# Patient Record
Sex: MALE | Race: WHITE | Employment: UNEMPLOYED | URBAN - METROPOLITAN AREA
[De-identification: names, ages, dates, MRNs, and addresses within clinical notes are randomized per-mention and may not be internally consistent; named-entity substitution may affect disease eponyms.]

---

## 2019-06-17 ENCOUNTER — HOSPITAL ENCOUNTER (EMERGENCY)
Age: 49
Discharge: HOME | End: 2019-06-17
Attending: EMERGENCY MEDICINE
Payer: COMMERCIAL

## 2019-06-17 VITALS
SYSTOLIC BLOOD PRESSURE: 145 MMHG | DIASTOLIC BLOOD PRESSURE: 103 MMHG | HEIGHT: 69 IN | BODY MASS INDEX: 32.49 KG/M2 | OXYGEN SATURATION: 96 % | TEMPERATURE: 98.1 F | HEART RATE: 67 BPM | RESPIRATION RATE: 17 BRPM | WEIGHT: 219.36 LBS

## 2019-06-17 DIAGNOSIS — I10 HYPERTENSION, UNSPECIFIED TYPE: ICD-10-CM

## 2019-06-17 DIAGNOSIS — H66.003 ACUTE SUPPURATIVE OTITIS MEDIA OF BOTH EARS WITHOUT SPONTANEOUS RUPTURE OF TYMPANIC MEMBRANES, RECURRENCE NOT SPECIFIED: ICD-10-CM

## 2019-06-17 DIAGNOSIS — J30.9 ALLERGIC SINUSITIS: Primary | ICD-10-CM

## 2019-06-17 PROCEDURE — 99283 EMERGENCY DEPT VISIT LOW MDM: CPT

## 2019-06-17 RX ORDER — AMOXICILLIN 875 MG/1
875 TABLET, COATED ORAL 2 TIMES DAILY
Qty: 20 TABLET | Refills: 0 | Status: SHIPPED | OUTPATIENT
Start: 2019-06-17 | End: 2019-06-27

## 2019-06-17 RX ORDER — PREDNISONE 20 MG/1
TABLET ORAL
Qty: 18 TABLET | Refills: 0 | Status: SHIPPED | OUTPATIENT
Start: 2019-06-17 | End: 2019-06-27

## 2019-06-17 ASSESSMENT — PAIN SCALES - GENERAL
PAINLEVEL_OUTOF10: 0
PAINLEVEL_OUTOF10: 0

## 2019-06-17 NOTE — ED PROVIDER NOTES
[facility]  eMERGENCY dEPARTMENT eNCOUnter      Pt Name: Sue Rai  MRN: [id number]  [place] 1970  Date of evaluation: 6/17/2019  Provider: Suly Lipcsomb MD    [address] [place] [address]       Chief Complaint   Patient presents with    Cough     For two weeks. Accompanied by sinus congestion and ear fullness. HISTORY OF PRESENT ILLNESS  (Location/Symptom, Timing/Onset, Context/Setting, Quality, Duration, Modifying Factors, Severity.)   Sue Rai is a 46 y.o. male who presents to the emergency department complaining of nasal congestion, sinus pressure, postnasal drip and cough for 2 weeks. He states is been intermittently better and worse. Initially his nasal drainage was green, is now clear. He has pressure and discomfort in both ears. He denies any documented fever. His cough was initially productive, but is no longer productive. He coughs worse at night when he lays down. No chest pain. He does smoke a half a pack a day. He has no pulmonary disease. He has no history of hypertension. Nursing Notes were reviewed and I agree. REVIEW OF SYSTEMS    (2-9 systems for level 4, 10 or more for level 5)     General: No fever or chills. ENT: Nasal congestion, postnasal drip, ear pressure and discomfort. Respiratory: Cough as above. No shortness of breath. Except as noted above the remainder of the review of systems was reviewed and negative. PAST MEDICAL HISTORY         Diagnosis Date    ADD (attention deficit disorder)     Depression        SURGICAL HISTORY           Procedure Laterality Date    FRACTURE SURGERY         CURRENT MEDICATIONS       Previous Medications    No medications on file       ALLERGIES     Patient has no known allergies. FAMILY HISTORY     History reviewed. No pertinent family history. No family status information on file. SOCIAL HISTORY      reports that he has been smoking cigarettes.   He has been smoking about 0.50 packs per day. He has never used smokeless tobacco. He reports that he drinks alcohol. He reports that he has current or past drug history. PHYSICAL EXAM    (up to 7 for level 4, 8 or more for level 5)     ED Triage Vitals [06/17/19 1649]   BP Temp Temp Source Pulse Resp SpO2 Height Weight   (!) 183/110 98.1 °F (36.7 °C) Oral 67 17 96 % 5' 9\" (1.753 m) 219 lb 5.7 oz (99.5 kg)       General: Alert white male in no acute distress. Head: Atraumatic and normocephalic. Eyes: No conjunctival injection. Pupils equal round reactive. ENT: TMs are dull retracted and slightly red bilaterally. Nose shows clear rhinorrhea. Oropharynx is moist without erythema. Neck: Supple, nontender, no adenopathy. Heart: Regular rate and rhythm. No murmurs or gallops noted. Lungs: Breath sounds equal bilaterally and clear. No rales rhonchi or wheezes. DIAGNOSTIC RESULTS     RADIOLOGY:   Non-plain film images such as CT, Ultrasound and MRI are read by the radiologist. Plain radiographic images are visualized and preliminarily interpreted by Crystal Bond MD with the below findings:      Interpretation per the Radiologist below, if available at the time of this note:    No orders to display       LABS:  Labs Reviewed - No data to display    All other labs were within normal range or not returned as of this dictation. EMERGENCY DEPARTMENT COURSE and DIFFERENTIAL DIAGNOSIS/MDM:   Vitals:    Vitals:    06/17/19 1649   BP: (!) 183/110   Pulse: 67   Resp: 17   Temp: 98.1 °F (36.7 °C)   TempSrc: Oral   SpO2: 96%   Weight: 219 lb 5.7 oz (99.5 kg)   Height: 5' 9\" (1.753 m)       Clinically the patient has a sinusitis, likely allergic with a secondary otitis bilaterally. He has no evidence of tonsillitis or pharyngitis. His lungs are clear. He has no evidence of pneumonia or bronchitis. He is already taking an over-the-counter antihistamine. He was instructed to continue this.   He was put on a 9-day prednisone taper along with amoxicillin for a separative otitis media. He was given a primary care referral.  His blood pressure was significantly elevated. He states he is never been treated for hypertension. I explained to him the risk of untreated hypertension including but not limited to stroke, renal failure, congestive heart failure with cardiomyopathy. I stressed to him the importance of documenting his blood pressures at home and following up with a primary care provider to discuss further evaluation and treatment. The treatment plan was discussed. The follow-up was discussed. The patient understands the treatment plan follow-up. PROCEDURES:  None    FINAL IMPRESSION      1. Allergic sinusitis    2. Acute suppurative otitis media of both ears without spontaneous rupture of tympanic membranes, recurrence not specified          DISPOSITION/PLAN   DISPOSITION Decision To Discharge 06/17/2019 05:03:16 PM      PATIENT REFERRED TO:  No follow-up provider specified.     DISCHARGE MEDICATIONS:  New Prescriptions    AMOXICILLIN (AMOXIL) 875 MG TABLET    Take 1 tablet by mouth 2 times daily for 10 days    PREDNISONE (DELTASONE) 20 MG TABLET    3 tabs po qam for 3 days then 2 tabs qam for 3 days the 1 tab qam for 3 days       (Please note that portions of this note were completed with a voice recognition program.  Efforts were made to edit the dictations but occasionally words are mis-transcribed.)    Aroldo Hirsch MD  Attending Emergency Physician        Arik Banuelos MD  06/17/19 9748

## 2019-06-17 NOTE — ED TRIAGE NOTES
Patient states he has had a cough and sinus congestion for two weeks. Initially he had some green phlegm. No recent fever. Taking OTC cough and cold medications. Cough is worse at night. Has cut back to 1/2 pack per day for smoking.

## 2019-06-17 NOTE — LETTER
[address] [address]  Phone: [phone]               June 17, 2019    Patient: Savi Mccallum   YOB: 1970   Date of Visit: 6/17/2019       To Whom It May Concern:    Melyssa Ybarra was seen and treated in our emergency department on 6/17/2019. He may return to work on 6/18/19.       Sincerely,       Dr. Torie Hunter        Signature:__________________________________

## 2019-06-24 ENCOUNTER — OFFICE VISIT (OUTPATIENT)
Dept: INTERNAL MEDICINE CLINIC | Age: 49
End: 2019-06-24
Payer: COMMERCIAL

## 2019-06-24 VITALS
DIASTOLIC BLOOD PRESSURE: 82 MMHG | HEART RATE: 70 BPM | TEMPERATURE: 98.3 F | HEIGHT: 69 IN | SYSTOLIC BLOOD PRESSURE: 128 MMHG | WEIGHT: 223 LBS | BODY MASS INDEX: 33.03 KG/M2 | OXYGEN SATURATION: 98 %

## 2019-06-24 DIAGNOSIS — Z13.220 SCREENING CHOLESTEROL LEVEL: ICD-10-CM

## 2019-06-24 DIAGNOSIS — R03.0 BORDERLINE HYPERTENSION: Primary | ICD-10-CM

## 2019-06-24 DIAGNOSIS — Z12.5 PROSTATE CANCER SCREENING: ICD-10-CM

## 2019-06-24 DIAGNOSIS — H66.93 BILATERAL OTITIS MEDIA, UNSPECIFIED OTITIS MEDIA TYPE: ICD-10-CM

## 2019-06-24 PROCEDURE — 4004F PT TOBACCO SCREEN RCVD TLK: CPT | Performed by: INTERNAL MEDICINE

## 2019-06-24 PROCEDURE — G8417 CALC BMI ABV UP PARAM F/U: HCPCS | Performed by: INTERNAL MEDICINE

## 2019-06-24 PROCEDURE — G8427 DOCREV CUR MEDS BY ELIG CLIN: HCPCS | Performed by: INTERNAL MEDICINE

## 2019-06-24 PROCEDURE — 99203 OFFICE O/P NEW LOW 30 MIN: CPT | Performed by: INTERNAL MEDICINE

## 2019-06-24 PROCEDURE — 3017F COLORECTAL CA SCREEN DOC REV: CPT | Performed by: INTERNAL MEDICINE

## 2019-06-24 RX ORDER — FLUTICASONE PROPIONATE 50 MCG
1 SPRAY, SUSPENSION (ML) NASAL DAILY
Qty: 1 BOTTLE | Refills: 2 | Status: SHIPPED | OUTPATIENT
Start: 2019-06-24 | End: 2019-09-20 | Stop reason: SDUPTHER

## 2019-06-24 ASSESSMENT — PATIENT HEALTH QUESTIONNAIRE - PHQ9
SUM OF ALL RESPONSES TO PHQ QUESTIONS 1-9: 0
SUM OF ALL RESPONSES TO PHQ9 QUESTIONS 1 & 2: 0
2. FEELING DOWN, DEPRESSED OR HOPELESS: 0
SUM OF ALL RESPONSES TO PHQ QUESTIONS 1-9: 0
1. LITTLE INTEREST OR PLEASURE IN DOING THINGS: 0

## 2019-06-24 ASSESSMENT — ENCOUNTER SYMPTOMS
NAUSEA: 0
SHORTNESS OF BREATH: 0
BACK PAIN: 0
BLOOD IN STOOL: 0
DIARRHEA: 1
ABDOMINAL PAIN: 0
CONSTIPATION: 0
VOMITING: 0
COUGH: 0
RHINORRHEA: 0
SINUS PRESSURE: 0

## 2019-06-24 NOTE — PROGRESS NOTES
SUBJECTIVE:  Campbell Vargas is a 44 y.o. male being evaluated for:    Chief Complaint   Patient presents with   [facility] Established New Doctor     No pcp for several years    ED Follow-up     THE [facility] 6/17/19 for sinusitis. currently taking abx and steroid. symptoms improved.  Hypertension     BP was 183/110 @ ED       HPI   Patient went to ed with head congestion cough and diagnosed with sinusits  Had taken mucinex and allergy medicines  OTC  Treated with amoxil and prednisone   [place]  Now better   No headaches nose bleeds cp are dizzines     No Known Allergies  Current Outpatient Medications   Medication Sig Dispense Refill    Loratadine (CLARITIN PO) Take by mouth daily as needed (allergies)      fluticasone (FLONASE) 50 MCG/ACT nasal spray 1 spray by Each Nostril route daily 1 Bottle 2     No current facility-administered medications for this visit.           Social History     Socioeconomic History    Marital status:      Spouse name: Not on file    Number of children: Not on file    Years of education: Not on file    Highest education level: Not on file   Occupational History    Not on file   Social Needs    Financial resource strain: Not on file    Food insecurity:     Worry: Not on file     Inability: Not on file    Transportation needs:     Medical: Not on file     Non-medical: Not on file   Tobacco Use    Smoking status: Current Every Day Smoker     Packs/day: 0.50     Years: 35.00     Pack years: 17.50     Types: Cigarettes     Start date: 1/1/1988    Smokeless tobacco: Never Used   Substance and Sexual Activity    Alcohol use: Yes     Comment: occassional    Drug use: Never    Sexual activity: Yes     Partners: Female   Lifestyle    Physical activity:     Days per week: Not on file     Minutes per session: Not on file    Stress: Not on file   Relationships    Social connections:     Talks on phone: Not on file     Gets together: Not on file     Attends [employer].       OBJECTIVE:  /82   Pulse 70   Temp 98.3 °F (36.8 °C) (Oral)   Ht 5' 9\" (1.753 m)   Wt 223 lb (101.2 kg)   SpO2 98%   BMI 32.93 kg/m²      Body mass index is 32.93 kg/m². Physical Exam   Constitutional: He is oriented to person, place, and time. He appears well-developed and well-nourished. No distress. HENT:   Head: Normocephalic and atraumatic. Right Ear: External ear normal.   Left Ear: External ear normal.   Mouth/Throat: Oropharynx is clear and moist.   Tm clear    Eyes: Conjunctivae are normal.   Neck: Neck supple. No JVD present. No thyromegaly present. No bruits    Cardiovascular: Normal rate, regular rhythm and normal heart sounds. Exam reveals no gallop and no friction rub. No murmur heard. Pulmonary/Chest: Effort normal and breath sounds normal. He exhibits no tenderness. Abdominal: Soft. He exhibits no distension. There is no tenderness. No HSM   Musculoskeletal: He exhibits no edema. Lymphadenopathy:     He has no cervical adenopathy. Neurological: He is alert and oriented to person, place, and time. Coordination normal.   Skin: Skin is warm and dry. No rash noted. Psychiatric: He has a normal mood and affect. His behavior is normal.   Nursing note and vitals reviewed. ASSESSMENT/PLAN:    Zane Corado was seen today for established new doctor, ed follow-up and hypertension. Diagnoses and all orders for this visit:    Borderline hypertension okay here today. To decrease caffeine and salt diet and will watch   -     Comprehensive Metabolic Panel; Future    Prostate cancer screening  -     Psa screening; Future    Screening cholesterol level  -     Lipid Panel; Future    Bilateral otitis media, unspecified otitis media type overall better than at the ER.   We will do some Flonase for rhinitis and prevention  -     fluticasone (FLONASE) 50 MCG/ACT nasal spray; 1 spray by Each Nostril route daily        Orders Placed This Encounter   Medications    fluticasone (FLONASE) 50 MCG/ACT nasal spray     Si spray by Each Nostril route daily     Dispense:  1 Bottle     Refill:  2        Return in about 6 months (around 2019), or if symptoms worsen or fail to improve. There are no Patient Instructions on file for this visit.

## 2019-06-28 DIAGNOSIS — Z13.220 SCREENING CHOLESTEROL LEVEL: ICD-10-CM

## 2019-06-28 DIAGNOSIS — R03.0 BORDERLINE HYPERTENSION: ICD-10-CM

## 2019-06-28 DIAGNOSIS — Z12.5 PROSTATE CANCER SCREENING: ICD-10-CM

## 2019-06-28 LAB
A/G RATIO: 1.2 (ref 1.1–2.2)
ALBUMIN SERPL-MCNC: 4.1 G/DL (ref 3.4–5)
ALP BLD-CCNC: 66 U/L (ref 40–129)
ALT SERPL-CCNC: 50 U/L (ref 10–40)
ANION GAP SERPL CALCULATED.3IONS-SCNC: 12 MMOL/L (ref 3–16)
AST SERPL-CCNC: 39 U/L (ref 15–37)
BILIRUB SERPL-MCNC: <0.2 MG/DL (ref 0–1)
BUN BLDV-MCNC: 14 MG/DL (ref 7–20)
CALCIUM SERPL-MCNC: 9.4 MG/DL (ref 8.3–10.6)
CHLORIDE BLD-SCNC: 100 MMOL/L (ref 99–110)
CHOLESTEROL, TOTAL: 251 MG/DL (ref 0–199)
CO2: 25 MMOL/L (ref 21–32)
CREAT SERPL-MCNC: 1 MG/DL (ref 0.9–1.3)
GFR AFRICAN AMERICAN: >60
GFR NON-AFRICAN AMERICAN: >60
GLOBULIN: 3.3 G/DL
GLUCOSE BLD-MCNC: 101 MG/DL (ref 70–99)
HDLC SERPL-MCNC: 44 MG/DL (ref 40–60)
LDL CHOLESTEROL CALCULATED: ABNORMAL MG/DL
LDL CHOLESTEROL DIRECT: 137 MG/DL
POTASSIUM SERPL-SCNC: 4.7 MMOL/L (ref 3.5–5.1)
PROSTATE SPECIFIC ANTIGEN: 0.71 NG/ML (ref 0–4)
SODIUM BLD-SCNC: 137 MMOL/L (ref 136–145)
TOTAL PROTEIN: 7.4 G/DL (ref 6.4–8.2)
TRIGL SERPL-MCNC: 490 MG/DL (ref 0–150)
VLDLC SERPL CALC-MCNC: ABNORMAL MG/DL

## 2019-07-11 ENCOUNTER — OFFICE VISIT (OUTPATIENT)
Dept: INTERNAL MEDICINE CLINIC | Age: 49
End: 2019-07-11
Payer: COMMERCIAL

## 2019-07-11 VITALS
SYSTOLIC BLOOD PRESSURE: 138 MMHG | RESPIRATION RATE: 16 BRPM | WEIGHT: 220 LBS | DIASTOLIC BLOOD PRESSURE: 80 MMHG | BODY MASS INDEX: 32.49 KG/M2 | HEART RATE: 68 BPM | OXYGEN SATURATION: 96 % | TEMPERATURE: 97.9 F

## 2019-07-11 DIAGNOSIS — H66.92 OTITIS OF LEFT EAR: Primary | ICD-10-CM

## 2019-07-11 PROCEDURE — 3017F COLORECTAL CA SCREEN DOC REV: CPT | Performed by: INTERNAL MEDICINE

## 2019-07-11 PROCEDURE — G8427 DOCREV CUR MEDS BY ELIG CLIN: HCPCS | Performed by: INTERNAL MEDICINE

## 2019-07-11 PROCEDURE — G8417 CALC BMI ABV UP PARAM F/U: HCPCS | Performed by: INTERNAL MEDICINE

## 2019-07-11 PROCEDURE — 99213 OFFICE O/P EST LOW 20 MIN: CPT | Performed by: INTERNAL MEDICINE

## 2019-07-11 PROCEDURE — 4004F PT TOBACCO SCREEN RCVD TLK: CPT | Performed by: INTERNAL MEDICINE

## 2019-07-11 RX ORDER — CHLORAL HYDRATE 500 MG
2000 CAPSULE ORAL
COMMUNITY

## 2019-07-11 RX ORDER — OFLOXACIN 3 MG/ML
5 SOLUTION AURICULAR (OTIC) 2 TIMES DAILY
Qty: 10 ML | Refills: 0 | Status: SHIPPED | OUTPATIENT
Start: 2019-07-11 | End: 2019-12-02 | Stop reason: ALTCHOICE

## 2019-07-11 RX ORDER — SULFAMETHOXAZOLE AND TRIMETHOPRIM 800; 160 MG/1; MG/1
1 TABLET ORAL 2 TIMES DAILY
Qty: 20 TABLET | Refills: 0 | Status: SHIPPED | OUTPATIENT
Start: 2019-07-11 | End: 2019-07-21

## 2019-07-11 NOTE — PATIENT INSTRUCTIONS
Please call your pharmacy if you need any refills of your medication(s). Please call our office at [phone] if you don't hear from us about your test results. Bring an accurate list of your medications with you at every appointment to ensure that we have the correct information.     Our office hours are: Monday - Friday 8:30 am- 5 pm    Phone lines turn on at 8:30 am

## 2019-07-20 ASSESSMENT — ENCOUNTER SYMPTOMS
SORE THROAT: 0
NAUSEA: 0
COUGH: 0
VOMITING: 0
SINUS PRESSURE: 0
ABDOMINAL PAIN: 0
DIARRHEA: 0
SHORTNESS OF BREATH: 0

## 2019-09-20 RX ORDER — FLUTICASONE PROPIONATE 50 MCG
SPRAY, SUSPENSION (ML) NASAL
Qty: 1 BOTTLE | Refills: 5 | Status: SHIPPED | OUTPATIENT
Start: 2019-09-20 | End: 2020-03-31

## 2019-11-19 ENCOUNTER — TELEPHONE (OUTPATIENT)
Dept: INTERNAL MEDICINE CLINIC | Age: 49
End: 2019-11-19

## 2019-11-19 ENCOUNTER — NURSE ONLY (OUTPATIENT)
Dept: INTERNAL MEDICINE CLINIC | Age: 49
End: 2019-11-19
Payer: COMMERCIAL

## 2019-11-19 DIAGNOSIS — Z23 NEED FOR IMMUNIZATION AGAINST INFLUENZA: Primary | ICD-10-CM

## 2019-11-19 PROCEDURE — 90471 IMMUNIZATION ADMIN: CPT | Performed by: INTERNAL MEDICINE

## 2019-11-19 PROCEDURE — 90686 IIV4 VACC NO PRSV 0.5 ML IM: CPT | Performed by: INTERNAL MEDICINE

## 2019-12-02 ENCOUNTER — OFFICE VISIT (OUTPATIENT)
Dept: INTERNAL MEDICINE CLINIC | Age: 49
End: 2019-12-02
Payer: COMMERCIAL

## 2019-12-02 VITALS
HEART RATE: 76 BPM | OXYGEN SATURATION: 97 % | SYSTOLIC BLOOD PRESSURE: 146 MMHG | BODY MASS INDEX: 33.41 KG/M2 | WEIGHT: 225.6 LBS | TEMPERATURE: 97.6 F | HEIGHT: 69 IN | DIASTOLIC BLOOD PRESSURE: 92 MMHG

## 2019-12-02 DIAGNOSIS — S16.1XXA STRAIN OF NECK MUSCLE, INITIAL ENCOUNTER: ICD-10-CM

## 2019-12-02 DIAGNOSIS — I10 ESSENTIAL HYPERTENSION: Primary | ICD-10-CM

## 2019-12-02 DIAGNOSIS — Z13.220 SCREENING CHOLESTEROL LEVEL: ICD-10-CM

## 2019-12-02 DIAGNOSIS — F90.0 ATTENTION DEFICIT HYPERACTIVITY DISORDER (ADHD), PREDOMINANTLY INATTENTIVE TYPE: ICD-10-CM

## 2019-12-02 DIAGNOSIS — E78.2 MIXED HYPERLIPIDEMIA: ICD-10-CM

## 2019-12-02 PROBLEM — R03.0 ELEVATED BLOOD-PRESSURE READING, WITHOUT DIAGNOSIS OF HYPERTENSION: Status: ACTIVE | Noted: 2019-12-02

## 2019-12-02 PROCEDURE — 3017F COLORECTAL CA SCREEN DOC REV: CPT | Performed by: INTERNAL MEDICINE

## 2019-12-02 PROCEDURE — G8417 CALC BMI ABV UP PARAM F/U: HCPCS | Performed by: INTERNAL MEDICINE

## 2019-12-02 PROCEDURE — G8482 FLU IMMUNIZE ORDER/ADMIN: HCPCS | Performed by: INTERNAL MEDICINE

## 2019-12-02 PROCEDURE — G8427 DOCREV CUR MEDS BY ELIG CLIN: HCPCS | Performed by: INTERNAL MEDICINE

## 2019-12-02 PROCEDURE — 99213 OFFICE O/P EST LOW 20 MIN: CPT | Performed by: INTERNAL MEDICINE

## 2019-12-02 PROCEDURE — 4004F PT TOBACCO SCREEN RCVD TLK: CPT | Performed by: INTERNAL MEDICINE

## 2019-12-02 RX ORDER — TIZANIDINE 4 MG/1
TABLET ORAL
Qty: 30 TABLET | Refills: 0 | Status: SHIPPED | OUTPATIENT
Start: 2019-12-02 | End: 2020-01-13 | Stop reason: ALTCHOICE

## 2019-12-02 RX ORDER — LISINOPRIL 10 MG/1
10 TABLET ORAL DAILY
Qty: 90 TABLET | Refills: 1 | Status: SHIPPED | OUTPATIENT
Start: 2019-12-02 | End: 2020-01-13 | Stop reason: SINTOL

## 2019-12-02 RX ORDER — NAPROXEN SODIUM 220 MG
550 TABLET ORAL 2 TIMES DAILY WITH MEALS
Qty: 40 TABLET | Refills: 0 | COMMUNITY
Start: 2019-12-02

## 2019-12-17 ENCOUNTER — TELEPHONE (OUTPATIENT)
Dept: INTERNAL MEDICINE CLINIC | Age: 49
End: 2019-12-17

## 2019-12-17 ENCOUNTER — NURSE TRIAGE (OUTPATIENT)
Dept: OTHER | Facility: CLINIC | Age: 49
End: 2019-12-17

## 2019-12-17 DIAGNOSIS — R05.9 COUGH: Primary | ICD-10-CM

## 2019-12-17 RX ORDER — LEVOFLOXACIN 500 MG/1
500 TABLET, FILM COATED ORAL DAILY
Qty: 10 TABLET | Refills: 0 | Status: SHIPPED | OUTPATIENT
Start: 2019-12-17 | End: 2019-12-27

## 2019-12-17 RX ORDER — GUAIFENESIN AND CODEINE PHOSPHATE 100; 10 MG/5ML; MG/5ML
10 SOLUTION ORAL 3 TIMES DAILY PRN
Qty: 120 ML | Refills: 0 | Status: SHIPPED | OUTPATIENT
Start: 2019-12-17 | End: 2019-12-24

## 2019-12-23 ASSESSMENT — ENCOUNTER SYMPTOMS
ABDOMINAL PAIN: 0
DIARRHEA: 0
SINUS PRESSURE: 0
BACK PAIN: 0
NAUSEA: 0
SHORTNESS OF BREATH: 0
VOMITING: 0
BLOOD IN STOOL: 0
CONSTIPATION: 0
RHINORRHEA: 0
COUGH: 0

## 2020-01-13 ENCOUNTER — OFFICE VISIT (OUTPATIENT)
Dept: INTERNAL MEDICINE CLINIC | Age: 50
End: 2020-01-13
Payer: COMMERCIAL

## 2020-01-13 VITALS
BODY MASS INDEX: 34.21 KG/M2 | HEIGHT: 69 IN | SYSTOLIC BLOOD PRESSURE: 118 MMHG | HEART RATE: 70 BPM | TEMPERATURE: 97.9 F | OXYGEN SATURATION: 97 % | WEIGHT: 231 LBS | DIASTOLIC BLOOD PRESSURE: 78 MMHG

## 2020-01-13 PROCEDURE — G8417 CALC BMI ABV UP PARAM F/U: HCPCS | Performed by: INTERNAL MEDICINE

## 2020-01-13 PROCEDURE — 3017F COLORECTAL CA SCREEN DOC REV: CPT | Performed by: INTERNAL MEDICINE

## 2020-01-13 PROCEDURE — G8482 FLU IMMUNIZE ORDER/ADMIN: HCPCS | Performed by: INTERNAL MEDICINE

## 2020-01-13 PROCEDURE — 4004F PT TOBACCO SCREEN RCVD TLK: CPT | Performed by: INTERNAL MEDICINE

## 2020-01-13 PROCEDURE — G8427 DOCREV CUR MEDS BY ELIG CLIN: HCPCS | Performed by: INTERNAL MEDICINE

## 2020-01-13 PROCEDURE — 99212 OFFICE O/P EST SF 10 MIN: CPT | Performed by: INTERNAL MEDICINE

## 2020-01-13 ASSESSMENT — PATIENT HEALTH QUESTIONNAIRE - PHQ9
SUM OF ALL RESPONSES TO PHQ9 QUESTIONS 1 & 2: 0
SUM OF ALL RESPONSES TO PHQ QUESTIONS 1-9: 0
SUM OF ALL RESPONSES TO PHQ QUESTIONS 1-9: 0
1. LITTLE INTEREST OR PLEASURE IN DOING THINGS: 0
2. FEELING DOWN, DEPRESSED OR HOPELESS: 0

## 2020-01-13 NOTE — PROGRESS NOTES
on file     Active member of club or organization: Not on file     Attends meetings of clubs or organizations: Not on file     Relationship status: Not on file    Intimate partner violence:     Fear of current or ex partner: Not on file     Emotionally abused: Not on file     Physically abused: Not on file     Forced sexual activity: Not on file   Other Topics Concern    Not on file   Social History Narrative    Not on file      Past Medical History:   Diagnosis Date    ADD (attention deficit disorder)     Depression     Hypertension      Past Surgical History:   Procedure Laterality Date    FRACTURE SURGERY      right wrist    WISDOM TOOTH EXTRACTION         Review of Systems   Constitutional: Negative for activity change, fatigue and unexpected weight change. HENT: Negative for congestion and nosebleeds. Eyes: Negative for visual disturbance. Respiratory: Positive for cough (with lisinopril ). Negative for shortness of breath. Cardiovascular: Negative for chest pain, palpitations and leg swelling. Gastrointestinal: Negative for abdominal pain, blood in stool, constipation, diarrhea, nausea and vomiting. Genitourinary: Negative for difficulty urinating. Neurological: Negative for dizziness, light-headedness and headaches. OBJECTIVE:  /78   Pulse 70   Temp 97.9 °F (36.6 °C) (Oral)   Ht 5' 9\" (1.753 m)   Wt 231 lb (104.8 kg)   SpO2 97%   BMI 34.11 kg/m²      Body mass index is 34.11 kg/m². Physical Exam  Vitals signs and nursing note reviewed. Constitutional:       General: He is not in acute distress. Appearance: Normal appearance. He is well-developed. He is obese. HENT:      Head: Normocephalic and atraumatic. Right Ear: External ear normal.      Left Ear: External ear normal.      Mouth/Throat:      Pharynx: Oropharynx is clear. Eyes:      Conjunctiva/sclera: Conjunctivae normal.   Neck:      Musculoskeletal: Neck supple. Thyroid: No thyromegaly.

## 2020-01-19 ASSESSMENT — ENCOUNTER SYMPTOMS
ABDOMINAL PAIN: 0
VOMITING: 0
CONSTIPATION: 0
DIARRHEA: 0
SHORTNESS OF BREATH: 0
BLOOD IN STOOL: 0
COUGH: 1
NAUSEA: 0

## 2020-02-10 ENCOUNTER — TELEPHONE (OUTPATIENT)
Dept: INTERNAL MEDICINE CLINIC | Age: 50
End: 2020-02-10

## 2020-02-19 ENCOUNTER — OFFICE VISIT (OUTPATIENT)
Dept: INTERNAL MEDICINE CLINIC | Age: 50
End: 2020-02-19
Payer: COMMERCIAL

## 2020-02-19 VITALS
DIASTOLIC BLOOD PRESSURE: 60 MMHG | SYSTOLIC BLOOD PRESSURE: 122 MMHG | OXYGEN SATURATION: 95 % | HEART RATE: 66 BPM | RESPIRATION RATE: 16 BRPM | TEMPERATURE: 98 F | WEIGHT: 233 LBS | BODY MASS INDEX: 34.41 KG/M2

## 2020-02-19 PROCEDURE — 4004F PT TOBACCO SCREEN RCVD TLK: CPT | Performed by: INTERNAL MEDICINE

## 2020-02-19 PROCEDURE — G8427 DOCREV CUR MEDS BY ELIG CLIN: HCPCS | Performed by: INTERNAL MEDICINE

## 2020-02-19 PROCEDURE — 99213 OFFICE O/P EST LOW 20 MIN: CPT | Performed by: INTERNAL MEDICINE

## 2020-02-19 PROCEDURE — 3017F COLORECTAL CA SCREEN DOC REV: CPT | Performed by: INTERNAL MEDICINE

## 2020-02-19 PROCEDURE — G8417 CALC BMI ABV UP PARAM F/U: HCPCS | Performed by: INTERNAL MEDICINE

## 2020-02-19 PROCEDURE — G8482 FLU IMMUNIZE ORDER/ADMIN: HCPCS | Performed by: INTERNAL MEDICINE

## 2020-02-19 RX ORDER — DEXTROAMPHETAMINE SACCHARATE, AMPHETAMINE ASPARTATE MONOHYDRATE, DEXTROAMPHETAMINE SULFATE AND AMPHETAMINE SULFATE 5; 5; 5; 5 MG/1; MG/1; MG/1; MG/1
20 CAPSULE, EXTENDED RELEASE ORAL EVERY MORNING
Qty: 30 CAPSULE | Refills: 0 | Status: SHIPPED | OUTPATIENT
Start: 2020-02-19 | End: 2020-03-18 | Stop reason: ALTCHOICE

## 2020-02-19 NOTE — PROGRESS NOTES
SUBJECTIVE:  Aldair Moreno is a 44 y.o. male being evaluated for:    Chief Complaint   Patient presents with    ADHD     Patient wanting to discuss getting back on Adderall 30 mg. He previously was receiving this from Dr Anne Mehta, Psychiatrist in [facility] and the practice closed. Has not been on anything for at least 4 years. HPI   Trouble staying focused drifting off on 2 to 3 things previously on adderall  Problems at work  Trouble pulling orders  Forgetting parts of orders  Friends are talking to him   NO palipitaions  NO headaches  No irrtiabiliy   Allergies   Allergen Reactions    Lisinopril Other (See Comments)     Cough     Current Outpatient Medications   Medication Sig Dispense Refill    amphetamine-dextroamphetamine (ADDERALL XR) 20 MG extended release capsule Take 1 capsule by mouth every morning for 30 days. 30 capsule 0    naproxen sodium (ALEVE) 220 MG tablet Take 2.5 tablets by mouth 2 times daily (with meals) (Patient taking differently: Take 550 mg by mouth as needed ) 40 tablet 0    fluticasone (FLONASE) 50 MCG/ACT nasal spray SPRAY 1 SPRAY INTO EACH NOSTRIL EVERY DAY 1 Bottle 5    Omega-3 Fatty Acids (FISH OIL) 1000 MG CAPS Take 1,000 mg by mouth 2 times daily       Loratadine (CLARITIN PO) Take 10 mg by mouth daily as needed (allergies)        No current facility-administered medications for this visit.           Social History     Socioeconomic History    Marital status:      Spouse name: Not on file    Number of children: Not on file    Years of education: Not on file    Highest education level: Not on file   Occupational History    Not on file   Social Needs    Financial resource strain: Not on file    Food insecurity:     Worry: Not on file     Inability: Not on file    Transportation needs:     Medical: Not on file     Non-medical: Not on file   Tobacco Use    Smoking status: Current Every Day Smoker     Packs/day: 0.50     Years: 35.00     Pack years: 17.50 Types: Cigarettes     Start date: 1/1/1988    Smokeless tobacco: Never Used   Substance and Sexual Activity    Alcohol use: Yes     Comment: occassional    Drug use: Never    Sexual activity: Yes     Partners: Female   Lifestyle    Physical activity:     Days per week: Not on file     Minutes per session: Not on file    Stress: Not on file   Relationships    Social connections:     Talks on phone: Not on file     Gets together: Not on file     Attends [employer] service: Not on file     Active member of club or organization: Not on file     Attends meetings of clubs or organizations: Not on file     Relationship status: Not on file    Intimate partner violence:     Fear of current or ex partner: Not on file     Emotionally abused: Not on file     Physically abused: Not on file     Forced sexual activity: Not on file   Other Topics Concern    Not on file   Social History Narrative    Not on file      Past Medical History:   Diagnosis Date    ADD (attention deficit disorder)     Depression     Hypertension      Past Surgical History:   Procedure Laterality Date    FRACTURE SURGERY      right wrist    WISDOM TOOTH EXTRACTION         Review of Systems   HENT: Negative for nosebleeds. Respiratory: Negative for cough and shortness of breath. Cardiovascular: Negative for chest pain, palpitations and leg swelling. Gastrointestinal: Negative for abdominal pain, diarrhea, nausea and vomiting. Neurological: Negative for dizziness, light-headedness and headaches. Psychiatric/Behavioral: Positive for decreased concentration. Negative for agitation, behavioral problems and dysphoric mood. The patient is not nervous/anxious. OBJECTIVE:  /60 (Site: Left Upper Arm, Position: Sitting, Cuff Size: Large Adult)   Pulse 66   Temp 98 °F (36.7 °C) (Oral)   Resp 16   Wt 233 lb (105.7 kg)   SpO2 95%   BMI 34.41 kg/m²      Body mass index is 34.41 kg/m².      Physical Exam  Vitals signs and nursing note reviewed. Constitutional:       General: He is not in acute distress. Appearance: Normal appearance. He is well-developed. He is obese. HENT:      Head: Normocephalic and atraumatic. Right Ear: External ear normal.      Left Ear: External ear normal.      Mouth/Throat:      Pharynx: Oropharynx is clear. Eyes:      Conjunctiva/sclera: Conjunctivae normal.   Neck:      Musculoskeletal: Neck supple. Thyroid: No thyromegaly. Vascular: No carotid bruit or JVD. Comments: No TM or JVD   Cardiovascular:      Rate and Rhythm: Normal rate and regular rhythm. Heart sounds: Normal heart sounds. No murmur. No friction rub. No gallop. Pulmonary:      Effort: Pulmonary effort is normal.      Breath sounds: Normal breath sounds. Chest:      Chest wall: No tenderness. Abdominal:      General: There is no distension. Palpations: Abdomen is soft. Tenderness: There is no abdominal tenderness. Comments: No HSM   Musculoskeletal:         General: No swelling. Lymphadenopathy:      Cervical: No cervical adenopathy. Skin:     General: Skin is warm and dry. Neurological:      General: No focal deficit present. Mental Status: He is alert and oriented to person, place, and time. Gait: Gait normal.   Psychiatric:         Behavior: Behavior normal.         Thought Content: Thought content normal.         ASSESSMENT/PLAN:    Oralia Choudhury was seen today for adhd. Diagnoses and all orders for this visit:    Attention deficit disorder (ADD) without hyperactivity arrs was reviewed   -     amphetamine-dextroamphetamine (ADDERALL XR) 20 MG extended release capsule; Take 1 capsule by mouth every morning for 30 days. Orders Placed This Encounter   Medications    amphetamine-dextroamphetamine (ADDERALL XR) 20 MG extended release capsule     Sig: Take 1 capsule by mouth every morning for 30 days.      Dispense:  30 capsule     Refill:  0        Return in about 4 weeks

## 2020-03-06 ASSESSMENT — ENCOUNTER SYMPTOMS
ABDOMINAL PAIN: 0
SHORTNESS OF BREATH: 0
DIARRHEA: 0
NAUSEA: 0
COUGH: 0
VOMITING: 0

## 2020-03-18 ENCOUNTER — OFFICE VISIT (OUTPATIENT)
Dept: FAMILY MEDICINE CLINIC | Age: 50
End: 2020-03-18
Payer: COMMERCIAL

## 2020-03-18 VITALS
HEART RATE: 88 BPM | DIASTOLIC BLOOD PRESSURE: 84 MMHG | BODY MASS INDEX: 33.77 KG/M2 | HEIGHT: 69 IN | SYSTOLIC BLOOD PRESSURE: 136 MMHG | WEIGHT: 228 LBS | TEMPERATURE: 98.3 F | OXYGEN SATURATION: 98 %

## 2020-03-18 PROCEDURE — G8417 CALC BMI ABV UP PARAM F/U: HCPCS | Performed by: INTERNAL MEDICINE

## 2020-03-18 PROCEDURE — G8482 FLU IMMUNIZE ORDER/ADMIN: HCPCS | Performed by: INTERNAL MEDICINE

## 2020-03-18 PROCEDURE — 4004F PT TOBACCO SCREEN RCVD TLK: CPT | Performed by: INTERNAL MEDICINE

## 2020-03-18 PROCEDURE — G8427 DOCREV CUR MEDS BY ELIG CLIN: HCPCS | Performed by: INTERNAL MEDICINE

## 2020-03-18 PROCEDURE — 99212 OFFICE O/P EST SF 10 MIN: CPT | Performed by: INTERNAL MEDICINE

## 2020-03-18 PROCEDURE — 3017F COLORECTAL CA SCREEN DOC REV: CPT | Performed by: INTERNAL MEDICINE

## 2020-03-18 RX ORDER — DEXTROAMPHETAMINE SACCHARATE, AMPHETAMINE ASPARTATE, DEXTROAMPHETAMINE SULFATE AND AMPHETAMINE SULFATE 7.5; 7.5; 7.5; 7.5 MG/1; MG/1; MG/1; MG/1
30 TABLET ORAL DAILY
Qty: 30 TABLET | Refills: 0 | Status: SHIPPED | OUTPATIENT
Start: 2020-03-18 | End: 2020-04-15 | Stop reason: SDUPTHER

## 2020-03-18 NOTE — PROGRESS NOTES
Relationships    Social connections     Talks on phone: Not on file     Gets together: Not on file     Attends [employer] service: Not on file     Active member of club or organization: Not on file     Attends meetings of clubs or organizations: Not on file     Relationship status: Not on file    Intimate partner violence     Fear of current or ex partner: Not on file     Emotionally abused: Not on file     Physically abused: Not on file     Forced sexual activity: Not on file   Other Topics Concern    Not on file   Social History Narrative    Not on file      Past Medical History:   Diagnosis Date    ADD (attention deficit disorder)     Depression     Hypertension      Past Surgical History:   Procedure Laterality Date    FRACTURE SURGERY      right wrist    WISDOM TOOTH EXTRACTION         Review of Systems   HENT: Negative for nosebleeds. Respiratory: Negative for cough and shortness of breath. Cardiovascular: Negative for chest pain, palpitations and leg swelling. Gastrointestinal: Negative for abdominal pain, diarrhea, nausea and vomiting. Neurological: Negative for dizziness, light-headedness and headaches. Psychiatric/Behavioral: Positive for decreased concentration. Negative for agitation, behavioral problems and dysphoric mood. The patient is hyperactive. The patient is not nervous/anxious. OBJECTIVE:  /84   Pulse 88   Temp 98.3 °F (36.8 °C) (Oral)   Ht 5' 9\" (1.753 m)   Wt 228 lb (103.4 kg)   SpO2 98%   BMI 33.67 kg/m²      Body mass index is 33.67 kg/m². Physical Exam  Vitals signs and nursing note reviewed. Constitutional:       General: He is not in acute distress. Appearance: Normal appearance. He is well-developed. He is obese. HENT:      Head: Normocephalic and atraumatic. Right Ear: External ear normal.      Left Ear: External ear normal.      Mouth/Throat:      Pharynx: Oropharynx is clear.    Eyes:      Conjunctiva/sclera: Conjunctivae normal.   Neck:      Musculoskeletal: Neck supple. Thyroid: No thyromegaly. Vascular: No carotid bruit or JVD. Comments: No TM or JVD   Cardiovascular:      Rate and Rhythm: Normal rate and regular rhythm. Heart sounds: Normal heart sounds. No murmur. No friction rub. No gallop. Pulmonary:      Effort: Pulmonary effort is normal.      Breath sounds: Normal breath sounds. Chest:      Chest wall: No tenderness. Abdominal:      General: There is no distension. Palpations: Abdomen is soft. Tenderness: There is no abdominal tenderness. Comments: No HSM   Musculoskeletal:         General: No swelling. Lymphadenopathy:      Cervical: No cervical adenopathy. Skin:     General: Skin is warm and dry. Neurological:      General: No focal deficit present. Mental Status: He is alert and oriented to person, place, and time. Gait: Gait normal.   Psychiatric:         Behavior: Behavior normal.         Thought Content: Thought content normal.         ASSESSMENT/PLAN:    Salud Christensen was seen today for add. Diagnoses and all orders for this visit:    Attention deficit hyperactivity disorder (ADHD), predominantly inattentive type oarrs was reviewed   -     amphetamine-dextroamphetamine (ADDERALL, 30MG,) 30 MG tablet; Take 1 tablet by mouth daily for 30 days. Orders Placed This Encounter   Medications    amphetamine-dextroamphetamine (ADDERALL, 30MG,) 30 MG tablet     Sig: Take 1 tablet by mouth daily for 30 days. Dispense:  30 tablet     Refill:  0        Return in about 3 months (around 6/18/2020), or if symptoms worsen or fail to improve. There are no Patient Instructions on file for this visit.

## 2020-03-31 RX ORDER — FLUTICASONE PROPIONATE 50 MCG
SPRAY, SUSPENSION (ML) NASAL
Qty: 1 BOTTLE | Refills: 5 | Status: SHIPPED | OUTPATIENT
Start: 2020-03-31 | End: 2020-12-24

## 2020-04-11 ASSESSMENT — ENCOUNTER SYMPTOMS
SHORTNESS OF BREATH: 0
DIARRHEA: 0
ABDOMINAL PAIN: 0
NAUSEA: 0
COUGH: 0
VOMITING: 0

## 2020-04-15 ENCOUNTER — TELEPHONE (OUTPATIENT)
Dept: INTERNAL MEDICINE CLINIC | Age: 50
End: 2020-04-15

## 2020-04-15 RX ORDER — DEXTROAMPHETAMINE SACCHARATE, AMPHETAMINE ASPARTATE, DEXTROAMPHETAMINE SULFATE AND AMPHETAMINE SULFATE 7.5; 7.5; 7.5; 7.5 MG/1; MG/1; MG/1; MG/1
30 TABLET ORAL DAILY
Qty: 30 TABLET | Refills: 0 | Status: SHIPPED | OUTPATIENT
Start: 2020-04-16 | End: 2020-05-13 | Stop reason: SDUPTHER

## 2020-05-13 ENCOUNTER — TELEPHONE (OUTPATIENT)
Dept: FAMILY MEDICINE CLINIC | Age: 50
End: 2020-05-13

## 2020-05-13 RX ORDER — DEXTROAMPHETAMINE SACCHARATE, AMPHETAMINE ASPARTATE, DEXTROAMPHETAMINE SULFATE AND AMPHETAMINE SULFATE 7.5; 7.5; 7.5; 7.5 MG/1; MG/1; MG/1; MG/1
30 TABLET ORAL DAILY
Qty: 30 TABLET | Refills: 0 | Status: SHIPPED | OUTPATIENT
Start: 2020-05-13 | End: 2020-05-14 | Stop reason: SDUPTHER

## 2020-05-13 NOTE — TELEPHONE ENCOUNTER
patient requesting refill for Adderall 30mg  Last OV 3/18/20  Next OV 6/18/20  [facility] in [address] [phone]

## 2020-05-14 ENCOUNTER — TELEPHONE (OUTPATIENT)
Dept: FAMILY MEDICINE CLINIC | Age: 50
End: 2020-05-14

## 2020-05-14 RX ORDER — DEXTROAMPHETAMINE SACCHARATE, AMPHETAMINE ASPARTATE, DEXTROAMPHETAMINE SULFATE AND AMPHETAMINE SULFATE 7.5; 7.5; 7.5; 7.5 MG/1; MG/1; MG/1; MG/1
30 TABLET ORAL DAILY
Qty: 30 TABLET | Refills: 0 | Status: SHIPPED | OUTPATIENT
Start: 2020-05-14 | End: 2020-06-08 | Stop reason: SDUPTHER

## 2020-06-08 RX ORDER — DEXTROAMPHETAMINE SACCHARATE, AMPHETAMINE ASPARTATE, DEXTROAMPHETAMINE SULFATE AND AMPHETAMINE SULFATE 7.5; 7.5; 7.5; 7.5 MG/1; MG/1; MG/1; MG/1
30 TABLET ORAL DAILY
Qty: 30 TABLET | Refills: 0 | Status: SHIPPED | OUTPATIENT
Start: 2020-06-12 | End: 2020-07-07 | Stop reason: SDUPTHER

## 2020-06-08 RX ORDER — LISINOPRIL 10 MG/1
TABLET ORAL
Qty: 30 TABLET | Refills: 5 | OUTPATIENT
Start: 2020-06-08

## 2020-06-08 NOTE — TELEPHONE ENCOUNTER
Patient requesting a medication refill.   amphetamine-dextroamphetamine (ADDERALL, 30MG,) 30 MG tablet   [id number] D[facility] [phone]  Next office visit: 6/18/2020

## 2020-06-18 ENCOUNTER — VIRTUAL VISIT (OUTPATIENT)
Dept: FAMILY MEDICINE CLINIC | Age: 50
End: 2020-06-18
Payer: COMMERCIAL

## 2020-06-18 PROBLEM — F90.0 ATTENTION DEFICIT HYPERACTIVITY DISORDER (ADHD), PREDOMINANTLY INATTENTIVE TYPE: Status: ACTIVE | Noted: 2020-06-18

## 2020-06-18 PROCEDURE — 99441 PR PHYS/QHP TELEPHONE EVALUATION 5-10 MIN: CPT | Performed by: INTERNAL MEDICINE

## 2020-06-18 ASSESSMENT — ENCOUNTER SYMPTOMS
ABDOMINAL PAIN: 0
NAUSEA: 0
DIARRHEA: 0
SHORTNESS OF BREATH: 0
VOMITING: 0
COUGH: 0

## 2020-06-18 NOTE — PROGRESS NOTES
Nafisa Lind is a 44 y.o. male evaluated via telephone on 6/18/2020. Consent:  He and/or health care decision maker is aware that that he may receive a bill for this telephone service, depending on his insurance coverage, and has provided verbal consent to proceed: Yes      Documentation:  I communicated with the patient and/or health care decision maker about add. Details of this discussion including any medical advice provided: see note below  Continue all the same       I affirm this is a Patient Initiated Episode with a Patient who has not had a related appointment within my department in the past 7 days or scheduled within the next 24 hours. Patient identification was verified at the start of the visit: Yes    Total Time: minutes: 5-10 minutes    Note: not billable if this call serves to triage the patient into an appointment for the relevant concern      WARREN SANTANA     SUBJECTIVE:  Nafisa Lind is a 44 y.o. male being evaluated for:    Chief Complaint   Patient presents with    ADHD     3 mo f/u adhd       HPI   Doing well  Concentration is fine  Has not been working due to Pam. But overall the medicine is working. Depression and anxiety have been better on it. No side effects from the medication. Allergies   Allergen Reactions    Lisinopril Other (See Comments)     Cough     Current Outpatient Medications   Medication Sig Dispense Refill    amphetamine-dextroamphetamine (ADDERALL, 30MG,) 30 MG tablet Take 1 tablet by mouth daily for 30 days.  30 tablet 0    fluticasone (FLONASE) 50 MCG/ACT nasal spray SPRAY 1 SPRAY INTO EACH NOSTRIL EVERY DAY 1 Bottle 5    naproxen sodium (ALEVE) 220 MG tablet Take 2.5 tablets by mouth 2 times daily (with meals) (Patient taking differently: Take 550 mg by mouth as needed ) 40 tablet 0    Omega-3 Fatty Acids (FISH OIL) 1000 MG CAPS Take 2,000 mg by mouth daily       Loratadine (CLARITIN PO) Take 10 mg by mouth daily as needed (allergies) No current facility-administered medications for this visit. Social History     Socioeconomic History    Marital status:      Spouse name: Not on file    Number of children: Not on file    Years of education: Not on file    Highest education level: Not on file   Occupational History    Not on file   Social Needs    Financial resource strain: Not on file    Food insecurity     Worry: Not on file     Inability: Not on file    Transportation needs     Medical: Not on file     Non-medical: Not on file   Tobacco Use    Smoking status: Current Every Day Smoker     Packs/day: 0.50     Years: 35.00     Pack years: 17.50     Types: Cigarettes     Start date: 1/1/1988    Smokeless tobacco: Never Used   Substance and Sexual Activity    Alcohol use: Yes     Comment: occassional    Drug use: Never    Sexual activity: Yes     Partners: Female   Lifestyle    Physical activity     Days per week: Not on file     Minutes per session: Not on file    Stress: Not on file   Relationships    Social connections     Talks on phone: Not on file     Gets together: Not on file     Attends Taoism service: Not on file     Active member of club or organization: Not on file     Attends meetings of clubs or organizations: Not on file     Relationship status: Not on file    Intimate partner violence     Fear of current or ex partner: Not on file     Emotionally abused: Not on file     Physically abused: Not on file     Forced sexual activity: Not on file   Other Topics Concern    Not on file   Social History Narrative    Not on file      Past Medical History:   Diagnosis Date    ADD (attention deficit disorder)     Depression     Hypertension      Past Surgical History:   Procedure Laterality Date    FRACTURE SURGERY      right wrist    WISDOM TOOTH EXTRACTION         Review of Systems   Constitutional: Negative for activity change, fatigue and unexpected weight change.    Respiratory: Negative for

## 2020-06-25 ENCOUNTER — TELEPHONE (OUTPATIENT)
Dept: FAMILY MEDICINE CLINIC | Age: 50
End: 2020-06-25

## 2020-06-25 RX ORDER — AMOXICILLIN 875 MG/1
875 TABLET, COATED ORAL 2 TIMES DAILY
Qty: 20 TABLET | Refills: 0 | Status: SHIPPED | OUTPATIENT
Start: 2020-06-25 | End: 2020-07-05

## 2020-07-07 RX ORDER — DEXTROAMPHETAMINE SACCHARATE, AMPHETAMINE ASPARTATE, DEXTROAMPHETAMINE SULFATE AND AMPHETAMINE SULFATE 7.5; 7.5; 7.5; 7.5 MG/1; MG/1; MG/1; MG/1
30 TABLET ORAL DAILY
Qty: 30 TABLET | Refills: 0 | Status: SHIPPED | OUTPATIENT
Start: 2020-07-10 | End: 2020-08-05 | Stop reason: SDUPTHER

## 2020-07-17 ENCOUNTER — HOSPITAL ENCOUNTER (EMERGENCY)
Age: 50
Discharge: HOME | End: 2020-07-17
Attending: EMERGENCY MEDICINE
Payer: COMMERCIAL

## 2020-07-17 ENCOUNTER — APPOINTMENT (OUTPATIENT)
Dept: GENERAL RADIOLOGY | Age: 50
End: 2020-07-17
Payer: COMMERCIAL

## 2020-07-17 VITALS
WEIGHT: 224.87 LBS | TEMPERATURE: 98.1 F | BODY MASS INDEX: 33.31 KG/M2 | SYSTOLIC BLOOD PRESSURE: 134 MMHG | OXYGEN SATURATION: 96 % | HEIGHT: 69 IN | HEART RATE: 76 BPM | DIASTOLIC BLOOD PRESSURE: 99 MMHG | RESPIRATION RATE: 20 BRPM

## 2020-07-17 PROCEDURE — 99283 EMERGENCY DEPT VISIT LOW MDM: CPT

## 2020-07-17 PROCEDURE — 73562 X-RAY EXAM OF KNEE 3: CPT

## 2020-07-17 ASSESSMENT — PAIN DESCRIPTION - PAIN TYPE
TYPE: ACUTE PAIN
TYPE: ACUTE PAIN

## 2020-07-17 ASSESSMENT — PAIN DESCRIPTION - FREQUENCY: FREQUENCY: INTERMITTENT

## 2020-07-17 ASSESSMENT — PAIN - FUNCTIONAL ASSESSMENT: PAIN_FUNCTIONAL_ASSESSMENT: 0-10

## 2020-07-17 ASSESSMENT — PAIN DESCRIPTION - LOCATION: LOCATION: KNEE

## 2020-07-17 ASSESSMENT — PAIN DESCRIPTION - DESCRIPTORS: DESCRIPTORS: NAGGING;SPASM

## 2020-07-17 ASSESSMENT — PAIN DESCRIPTION - ORIENTATION: ORIENTATION: RIGHT

## 2020-07-17 ASSESSMENT — PAIN SCALES - GENERAL
PAINLEVEL_OUTOF10: 3
PAINLEVEL_OUTOF10: 3

## 2020-07-17 NOTE — ED TRIAGE NOTES
Patient came to ER with complaints of right knee pain. 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 Patient stated has pain lateral side of knee and in the knee cap.

## 2020-07-17 NOTE — ED NOTES
Discharge instructions reviewed. Patient verbalized understanding. Ace wrap placed on right knee.   Patient tolerated well     Urbano Hassan RN  07/17/20 8572

## 2020-07-17 NOTE — ED PROVIDER NOTES
Minutes per session: Not on file    Stress: Not on file   Relationships    Social connections     Talks on phone: Not on file     Gets together: Not on file     Attends Pentecostal service: Not on file     Active member of club or organization: Not on file     Attends meetings of clubs or organizations: Not on file     Relationship status: Not on file    Intimate partner violence     Fear of current or ex partner: Not on file     Emotionally abused: Not on file     Physically abused: Not on file     Forced sexual activity: Not on file   Other Topics Concern    Not on file   Social History Narrative    Not on file     No current facility-administered medications for this encounter. Current Outpatient Medications   Medication Sig Dispense Refill    amphetamine-dextroamphetamine (ADDERALL, 30MG,) 30 MG tablet Take 1 tablet by mouth daily for 30 days. 30 tablet 0    fluticasone (FLONASE) 50 MCG/ACT nasal spray SPRAY 1 SPRAY INTO EACH NOSTRIL EVERY DAY 1 Bottle 5    naproxen sodium (ALEVE) 220 MG tablet Take 2.5 tablets by mouth 2 times daily (with meals) (Patient taking differently: Take 550 mg by mouth as needed ) 40 tablet 0    Omega-3 Fatty Acids (FISH OIL) 1000 MG CAPS Take 2,000 mg by mouth daily       Loratadine (CLARITIN PO) Take 10 mg by mouth daily as needed (allergies)        Allergies   Allergen Reactions    Lisinopril Other (See Comments)     Cough       [unfilled]    Nursing Notes Reviewed    Physical Exam:  Vitals:    07/17/20 1454   BP: (!) 134/99   Pulse: 76   Resp: 20   Temp: 98.1 °F (36.7 °C)   SpO2: 96%       GENERAL APPEARANCE: Awake and alert. Cooperative. No acute distress. HEAD: Normocephalic. Atraumatic. EYES: EOM's grossly intact. Sclera anicteric. ENT: Mucous membranes are moist. Tolerates saliva. No trismus. NECK: Supple. No meningismus. Trachea midline. HEART: RRR. Radial pulses 2+. LUNGS: Respirations unlabored. CTAB  ABDOMEN: Soft. Non-tender.  No guarding or rebound. EXTREMITIES: Focused examination of the right lower extremity shows a well perfused lower extremity with appropriate capillary refill, soft compartments, strong DP/PT pulses. There is no blottable patella there is a negative Lachman sign; does note tenderness to the lateral portion of the right knee  SKIN: Warm and dry. NEUROLOGICAL: No gross facial drooping. Moves all 4 extremities spontaneously. PSYCHIATRIC: Normal mood. I have reviewed and interpreted all of the currently available lab results from this visit (if applicable):  No results found for this visit on 07/17/20. Radiographs (if obtained):  [] The following radiograph was interpreted by myself in the absence of a radiologist:  [x] Radiologist's Report Reviewed:     XR KNEE RIGHT (3 VIEWS) (Final result)   Result time 07/17/20 15:14:09   Final result by Angela Warren MD (07/17/20 15:14:09)                 Impression:     Negative for fracture or effusion             Narrative:     EXAMINATION:   THREE XRAY VIEWS OF THE RIGHT KNEE     7/17/2020 3:10 pm     COMPARISON:   None. HISTORY:   ORDERING SYSTEM PROVIDED HISTORY: R knee twisted/pain   TECHNOLOGIST PROVIDED HISTORY:   Reason for exam:->R knee twisted/pain   Reason for Exam: painful rt knee   Acuity: Acute   Type of Exam: Initial   Mechanism of Injury: Patient came to ER with complaints of right knee pain. 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 Patient   stated has pain lateral side of knee and in the knee cap     FINDINGS:   No evidence of fracture, dislocation, or significant joint effusion. Incidental bipartite patella                        EKG (if obtained): (All EKG's are interpreted by myself in the absence of a cardiologist)  Initial EKG on my interpretation shows n/a    MDM:  Differential diagnosis: Fracture, location, ligament injury, tendon injury    X-ray of the knee shows no acute abnormality.  I clearly have explained today's imaging does not rule out missed/occult fractures, nor ligamentous and/or tendonous injury and therefore patient must follow-up with orthopedics as referred for re-evaluation and possible advanced and/or repeat imaging. Discharged with Ace wrap, weightbearing as tolerated, rice care and Ortho follow-up. Discussed results, diagnosis and plan with patient and/or family. Questions addressed. Disposition and follow-up agreed upon. Specific discharge instructions explained. The patient and/or family and I have discussed the diagnosis and risks, and we agree with discharging home to follow-up with their primary care, specialist or referral doctor. In the event that medications were prescribed the risk profile of these medications were detailed expressly. We also discussed returning to the Emergency Department immediately if new or worsening symptoms occur. We have discussed the symptoms which are most concerning that necessitate immediate return. Old records reviewed. Labs and imaging reviewed and results discussed with patient. .        Patient was given scripts for the following medications. I counseled patient how to take these medications. New Prescriptions    No medications on file         CRITICAL CARE TIME   Total Critical Care time was 0 minutes, excluding separately reportable procedures. There was a high probability of clinically significant/life threatening deterioration in the patient's condition which required my urgent intervention. Clinical Impression:  1.  Acute pain of right knee       (Please note that portions of this note may have been completed with a voice recognition program. Efforts were made to edit the dictations but occasionally words are mis-transcribed.)    MD Sunitha Balderas MD  07/17/20 7505

## 2020-08-05 ENCOUNTER — TELEPHONE (OUTPATIENT)
Dept: FAMILY MEDICINE CLINIC | Age: 50
End: 2020-08-05

## 2020-08-05 RX ORDER — DEXTROAMPHETAMINE SACCHARATE, AMPHETAMINE ASPARTATE, DEXTROAMPHETAMINE SULFATE AND AMPHETAMINE SULFATE 7.5; 7.5; 7.5; 7.5 MG/1; MG/1; MG/1; MG/1
30 TABLET ORAL DAILY
Qty: 30 TABLET | Refills: 0 | Status: SHIPPED | OUTPATIENT
Start: 2020-08-09 | End: 2020-09-08 | Stop reason: SDUPTHER

## 2020-09-08 ENCOUNTER — TELEPHONE (OUTPATIENT)
Dept: FAMILY MEDICINE CLINIC | Age: 50
End: 2020-09-08

## 2020-09-08 RX ORDER — DEXTROAMPHETAMINE SACCHARATE, AMPHETAMINE ASPARTATE, DEXTROAMPHETAMINE SULFATE AND AMPHETAMINE SULFATE 7.5; 7.5; 7.5; 7.5 MG/1; MG/1; MG/1; MG/1
30 TABLET ORAL DAILY
Qty: 30 TABLET | Refills: 0 | Status: SHIPPED | OUTPATIENT
Start: 2020-09-08 | End: 2020-10-05 | Stop reason: SDUPTHER

## 2020-10-01 ENCOUNTER — TELEPHONE (OUTPATIENT)
Dept: FAMILY MEDICINE CLINIC | Age: 50
End: 2020-10-01

## 2020-10-01 NOTE — TELEPHONE ENCOUNTER
Last appt was a video visit 6/18/20  Had a follow up appt on 9/17/20 and patient cancelled.  No future appt

## 2020-10-01 NOTE — TELEPHONE ENCOUNTER
----- Message from Olya Henderson sent at 10/1/2020  9:12 AM EDT -----  Subject: Message to Provider    QUESTIONS  Information for Provider? Pt needs a refill on his Adderall 30 mgs 1/day.   ---------------------------------------------------------------------------  --------------  CALL BACK INFO  What is the best way for the office to contact you? OK to leave message on   voicemail  Preferred Call Back Phone Number? [phone]  ---------------------------------------------------------------------------  --------------  SCRIPT ANSWERS  Relationship to Patient?  Self

## 2020-10-01 NOTE — TELEPHONE ENCOUNTER
Not due until 10 6 as picked up at the pharmacy on 9 8. Technically this drug does require an every 3-month visit and his last visit was on 6-18?

## 2020-10-05 RX ORDER — DEXTROAMPHETAMINE SACCHARATE, AMPHETAMINE ASPARTATE, DEXTROAMPHETAMINE SULFATE AND AMPHETAMINE SULFATE 7.5; 7.5; 7.5; 7.5 MG/1; MG/1; MG/1; MG/1
30 TABLET ORAL DAILY
Qty: 30 TABLET | Refills: 0 | Status: SHIPPED | OUTPATIENT
Start: 2020-10-06 | End: 2020-11-03 | Stop reason: SDUPTHER

## 2020-10-05 NOTE — TELEPHONE ENCOUNTER
Clinically we have a message that patient needs to schedule a visit. Medicine really does need an every 3-month visit and he was seen in June had an appointment in September which was canceled and not rescheduled. I will fill it tomorrow as is not technically due till 10 8.   But also he will need a visit before I fill it anymore

## 2020-10-27 RX ORDER — DEXTROAMPHETAMINE SACCHARATE, AMPHETAMINE ASPARTATE, DEXTROAMPHETAMINE SULFATE AND AMPHETAMINE SULFATE 7.5; 7.5; 7.5; 7.5 MG/1; MG/1; MG/1; MG/1
30 TABLET ORAL DAILY
Qty: 30 TABLET | OUTPATIENT
Start: 2020-10-27 | End: 2020-11-26

## 2020-10-30 ENCOUNTER — HOSPITAL ENCOUNTER (EMERGENCY)
Age: 50
Discharge: HOME | End: 2020-10-30
Attending: EMERGENCY MEDICINE
Payer: COMMERCIAL

## 2020-10-30 ENCOUNTER — APPOINTMENT (OUTPATIENT)
Dept: CT IMAGING | Age: 50
End: 2020-10-30
Payer: COMMERCIAL

## 2020-10-30 VITALS
HEIGHT: 69 IN | SYSTOLIC BLOOD PRESSURE: 145 MMHG | BODY MASS INDEX: 31.64 KG/M2 | WEIGHT: 213.6 LBS | HEART RATE: 70 BPM | OXYGEN SATURATION: 95 % | RESPIRATION RATE: 18 BRPM | DIASTOLIC BLOOD PRESSURE: 96 MMHG | TEMPERATURE: 97.9 F

## 2020-10-30 LAB
A/G RATIO: 1.4 (ref 1.1–2.2)
ALBUMIN SERPL-MCNC: 4.2 G/DL (ref 3.4–5)
ALP BLD-CCNC: 86 U/L (ref 40–129)
ALT SERPL-CCNC: 35 U/L (ref 10–40)
ANION GAP SERPL CALCULATED.3IONS-SCNC: 12 MMOL/L (ref 3–16)
AST SERPL-CCNC: 23 U/L (ref 15–37)
BACTERIA: ABNORMAL /HPF
BASOPHILS ABSOLUTE: 0 K/UL (ref 0–0.2)
BASOPHILS RELATIVE PERCENT: 0 %
BILIRUB SERPL-MCNC: 0.3 MG/DL (ref 0–1)
BILIRUBIN URINE: NEGATIVE
BLOOD, URINE: ABNORMAL
BUN BLDV-MCNC: 15 MG/DL (ref 7–20)
CALCIUM SERPL-MCNC: 9.4 MG/DL (ref 8.3–10.6)
CHLORIDE BLD-SCNC: 102 MMOL/L (ref 99–110)
CLARITY: CLEAR
CO2: 24 MMOL/L (ref 21–32)
COLOR: YELLOW
CREAT SERPL-MCNC: 0.9 MG/DL (ref 0.9–1.3)
EOSINOPHILS ABSOLUTE: 0.1 K/UL (ref 0–0.6)
EOSINOPHILS RELATIVE PERCENT: 0.9 %
EPITHELIAL CELLS, UA: ABNORMAL /HPF (ref 0–5)
GFR AFRICAN AMERICAN: >60
GFR NON-AFRICAN AMERICAN: >60
GLOBULIN: 3.1 G/DL
GLUCOSE BLD-MCNC: 110 MG/DL (ref 70–99)
GLUCOSE URINE: NEGATIVE MG/DL
HCT VFR BLD CALC: 49.4 % (ref 40.5–52.5)
HEMOGLOBIN: 16.3 G/DL (ref 13.5–17.5)
KETONES, URINE: NEGATIVE MG/DL
LACTIC ACID: 1.5 MMOL/L (ref 0.4–2)
LEUKOCYTE ESTERASE, URINE: NEGATIVE
LIPASE: 26 U/L (ref 13–60)
LYMPHOCYTES ABSOLUTE: 2.5 K/UL (ref 1–5.1)
LYMPHOCYTES RELATIVE PERCENT: 24.7 %
MCH RBC QN AUTO: 30.7 PG (ref 26–34)
MCHC RBC AUTO-ENTMCNC: 33 G/DL (ref 31–36)
MCV RBC AUTO: 92.9 FL (ref 80–100)
MICROSCOPIC EXAMINATION: YES
MONOCYTES ABSOLUTE: 0.7 K/UL (ref 0–1.3)
MONOCYTES RELATIVE PERCENT: 6.7 %
MUCUS: ABNORMAL /LPF
NEUTROPHILS ABSOLUTE: 6.9 K/UL (ref 1.7–7.7)
NEUTROPHILS RELATIVE PERCENT: 67.7 %
NITRITE, URINE: NEGATIVE
PDW BLD-RTO: 13.1 % (ref 12.4–15.4)
PH UA: 5 (ref 5–8)
PLATELET # BLD: 358 K/UL (ref 135–450)
PMV BLD AUTO: 7.3 FL (ref 5–10.5)
POTASSIUM SERPL-SCNC: 4.1 MMOL/L (ref 3.5–5.1)
PROTEIN UA: NEGATIVE MG/DL
RBC # BLD: 5.32 M/UL (ref 4.2–5.9)
RBC UA: ABNORMAL /HPF (ref 0–4)
SODIUM BLD-SCNC: 138 MMOL/L (ref 136–145)
SPECIFIC GRAVITY UA: >=1.03 (ref 1–1.03)
TOTAL PROTEIN: 7.3 G/DL (ref 6.4–8.2)
URINE REFLEX TO CULTURE: ABNORMAL
URINE TYPE: ABNORMAL
UROBILINOGEN, URINE: 0.2 E.U./DL
WBC # BLD: 10.2 K/UL (ref 4–11)
WBC UA: ABNORMAL /HPF (ref 0–5)

## 2020-10-30 PROCEDURE — 83690 ASSAY OF LIPASE: CPT

## 2020-10-30 PROCEDURE — 80053 COMPREHEN METABOLIC PANEL: CPT

## 2020-10-30 PROCEDURE — 85025 COMPLETE CBC W/AUTO DIFF WBC: CPT

## 2020-10-30 PROCEDURE — 36415 COLL VENOUS BLD VENIPUNCTURE: CPT

## 2020-10-30 PROCEDURE — 83605 ASSAY OF LACTIC ACID: CPT

## 2020-10-30 PROCEDURE — 6360000004 HC RX CONTRAST MEDICATION: Performed by: EMERGENCY MEDICINE

## 2020-10-30 PROCEDURE — 81001 URINALYSIS AUTO W/SCOPE: CPT

## 2020-10-30 PROCEDURE — 99283 EMERGENCY DEPT VISIT LOW MDM: CPT

## 2020-10-30 PROCEDURE — 74177 CT ABD & PELVIS W/CONTRAST: CPT

## 2020-10-30 RX ORDER — AMOXICILLIN AND CLAVULANATE POTASSIUM 875; 125 MG/1; MG/1
1 TABLET, FILM COATED ORAL 2 TIMES DAILY
Qty: 14 TABLET | Refills: 0 | Status: SHIPPED | OUTPATIENT
Start: 2020-10-30 | End: 2020-11-03 | Stop reason: ALTCHOICE

## 2020-10-30 RX ADMIN — IOHEXOL 50 ML: 240 INJECTION, SOLUTION INTRATHECAL; INTRAVASCULAR; INTRAVENOUS; ORAL at 16:09

## 2020-10-30 RX ADMIN — IOPAMIDOL 100 ML: 755 INJECTION, SOLUTION INTRAVENOUS at 17:28

## 2020-10-30 NOTE — ED PROVIDER NOTES
Triage Chief Complaint:   Rectal Bleeding (left side pain started Tuesday, Dark blood in stool today)    [place]:  Laurent Rodriguez is a 44 y.o. male that presents for evaluation of atraumatic left lower quadrant abdominal pain since Tuesday accompanied with blood in stool that he appreciated today. He also notes that he has had a cough and is concerned for URI . No nausea no vomiting no fever no chills no anticoagulant use    ROS:  At least 12 systems reviewed and otherwise acutely negative except as in the [address]     Past Medical History:   Diagnosis Date    ADD (attention deficit disorder)     Depression     Hypertension      Past Surgical History:   Procedure Laterality Date    FRACTURE SURGERY      right wrist    WISDOM TOOTH EXTRACTION       Family History   Problem Relation Age of Onset    Kidney Cancer Mother     High Cholesterol Father     Allergy (Severe) Sister     No Known Problems Brother     Diabetes Maternal Aunt     Diabetes Maternal Uncle     No Known Problems Maternal Grandmother     Diabetes Maternal Grandfather     No Known Problems Paternal Grandmother     No Known Problems Paternal Grandfather      Social History     Socioeconomic History    Marital status:      Spouse name: Not on file    Number of children: Not on file    Years of education: Not on file    Highest education level: Not on file   Occupational History    Not on file   Social Needs    Financial resource strain: Not on file    Food insecurity     Worry: Not on file     Inability: Not on file    Transportation needs     Medical: Not on file     Non-medical: Not on file   Tobacco Use    Smoking status: Current Every Day Smoker     Packs/day: 1.00     Years: 35.00     Pack years: 35.00     Types: Cigarettes     Start date: 1/1/1988    Smokeless tobacco: Never Used   Substance and Sexual Activity    Alcohol use: Not Currently     Comment: occassional    Drug use: Never    Sexual activity: Yes Partners: Female   Lifestyle    Physical activity     Days per week: Not on file     Minutes per session: Not on file    Stress: Not on file   Relationships    Social connections     Talks on phone: Not on file     Gets together: Not on file     Attends Lutheran service: Not on file     Active member of club or organization: Not on file     Attends meetings of clubs or organizations: Not on file     Relationship status: Not on file    Intimate partner violence     Fear of current or ex partner: Not on file     Emotionally abused: Not on file     Physically abused: Not on file     Forced sexual activity: Not on file   Other Topics Concern    Not on file   Social History Narrative    Not on file     Current Facility-Administered Medications   Medication Dose Route Frequency Provider Last Rate Last Dose    iopamidol (ISOVUE-370) 76 % injection 100 mL  100 mL Intravenous ONCE PRN Nadeen Lua MD         Current Outpatient Medications   Medication Sig Dispense Refill    amphetamine-dextroamphetamine (ADDERALL, 30MG,) 30 MG tablet Take 1 tablet by mouth daily for 30 days. 30 tablet 0    naproxen sodium (ALEVE) 220 MG tablet Take 2.5 tablets by mouth 2 times daily (with meals) (Patient taking differently: Take 550 mg by mouth as needed ) 40 tablet 0    Omega-3 Fatty Acids (FISH OIL) 1000 MG CAPS Take 2,000 mg by mouth daily       Loratadine (CLARITIN PO) Take 10 mg by mouth daily as needed (allergies)       fluticasone (FLONASE) 50 MCG/ACT nasal spray SPRAY 1 SPRAY INTO EACH NOSTRIL EVERY DAY 1 Bottle 5     Allergies   Allergen Reactions    Lisinopril Other (See Comments)     Cough       [unfilled]    Nursing Notes Reviewed    Physical Exam:  Vitals:    10/30/20 1651   BP: (!) 142/92   Pulse: 71   Resp: 18   Temp:    SpO2: 96%       GENERAL APPEARANCE: Awake and alert. Cooperative. No acute distress. HEAD: Normocephalic. Atraumatic. EYES: EOM's grossly intact. Sclera anicteric.   ENT: Mucous membranes are moist. Tolerates saliva. No trismus. NECK: Supple. No meningismus. Trachea midline. HEART: RRR. Radial pulses 2+. LUNGS: Respirations unlabored. CTAB  ABDOMEN: Soft. TTP LLQ. No guarding or rebound. EXTREMITIES: No acute deformities. SKIN: Warm and dry. NEUROLOGICAL: No gross facial drooping. Moves all 4 extremities spontaneously. PSYCHIATRIC: Normal mood.   RECTAL: pt declines rectal exam    I have reviewed and interpreted all of the currently available lab results from this visit (if applicable):  Results for orders placed or performed during the hospital encounter of 10/30/20   CBC Auto Differential   Result Value Ref Range    WBC 10.2 4.0 - 11.0 K/uL    RBC 5.32 4.20 - 5.90 M/uL    Hemoglobin 16.3 13.5 - 17.5 g/dL    Hematocrit 49.4 40.5 - 52.5 %    MCV 92.9 80.0 - 100.0 fL    MCH 30.7 26.0 - 34.0 pg    MCHC 33.0 31.0 - 36.0 g/dL    RDW 13.1 12.4 - 15.4 %    Platelets 547 942 - 480 K/uL    MPV 7.3 5.0 - 10.5 fL    Neutrophils % 67.7 %    Lymphocytes % 24.7 %    Monocytes % 6.7 %    Eosinophils % 0.9 %    Basophils % 0.0 %    Neutrophils Absolute 6.9 1.7 - 7.7 K/uL    Lymphocytes Absolute 2.5 1.0 - 5.1 K/uL    Monocytes Absolute 0.7 0.0 - 1.3 K/uL    Eosinophils Absolute 0.1 0.0 - 0.6 K/uL    Basophils Absolute 0.0 0.0 - 0.2 K/uL   Comprehensive Metabolic Panel   Result Value Ref Range    Sodium 138 136 - 145 mmol/L    Potassium 4.1 3.5 - 5.1 mmol/L    Chloride 102 99 - 110 mmol/L    CO2 24 21 - 32 mmol/L    Anion Gap 12 3 - 16    Glucose 110 (H) 70 - 99 mg/dL    BUN 15 7 - 20 mg/dL    CREATININE 0.9 0.9 - 1.3 mg/dL    GFR Non-African American >60 >60    GFR African American >60 >60    Calcium 9.4 8.3 - 10.6 mg/dL    Total Protein 7.3 6.4 - 8.2 g/dL    Alb 4.2 3.4 - 5.0 g/dL    Albumin/Globulin Ratio 1.4 1.1 - 2.2    Total Bilirubin 0.3 0.0 - 1.0 mg/dL    Alkaline Phosphatase 86 40 - 129 U/L    ALT 35 10 - 40 U/L    AST 23 15 - 37 U/L    Globulin 3.1 g/dL   Lipase   Result Value Ref Range Lipase 26.0 13.0 - 60.0 U/L   Lactic Acid, Plasma   Result Value Ref Range    Lactic Acid 1.5 0.4 - 2.0 mmol/L   Urine reflex to culture    Specimen: Urine, clean catch   Result Value Ref Range    Color, UA Yellow Straw/Yellow    Clarity, UA Clear Clear    Glucose, Ur Negative Negative mg/dL    Bilirubin Urine Negative Negative    Ketones, Urine Negative Negative mg/dL    Specific Gravity, UA >=1.030 1.005 - 1.030    Blood, Urine SMALL (A) Negative    pH, UA 5.0 5.0 - 8.0    Protein, UA Negative Negative mg/dL    Urobilinogen, Urine 0.2 <2.0 E.U./dL    Nitrite, Urine Negative Negative    Leukocyte Esterase, Urine Negative Negative    Microscopic Examination YES     Urine Type Cleancatch         Radiographs (if obtained):  [] The following radiograph was interpreted by myself in the absence of a radiologist:  [x] Radiologist's Report Reviewed:       CT ABDOMEN PELVIS W IV CONTRAST Additional Contrast? Oral (Final result)   Result time 10/30/20 17:52:46   Final result by Steve Murphy DO (10/30/20 17:52:46)                 Impression:     Focal area of inflammatory changes in the pericolonic fat adjacent to the   descending colon compatible with epiploic appendagitis. Hepatomegaly and steatosis with regions of focal fatty sparing. Renal cysts. Narrative:     EXAMINATION:   CT OF THE ABDOMEN AND PELVIS WITH CONTRAST 10/30/2020 1:07 pm     TECHNIQUE:   CT of the abdomen and pelvis was performed with the administration of   intravenous contrast. Multiplanar reformatted images are provided for review. Dose modulation, iterative reconstruction, and/or weight based adjustment of   the mA/kV was utilized to reduce the radiation dose to as low as reasonably   achievable. COMPARISON:   None.      HISTORY:   ORDERING SYSTEM PROVIDED HISTORY: LLQ ABD PAIN W BLOOD IN STOOL>  PO AND IV   CON PLEASE   TECHNOLOGIST PROVIDED HISTORY:   Reason for exam:->LLQ ABD PAIN W BLOOD IN STOOL>  PO AND IV CON unremarkable. Rectal exam declined by patient. CT of the abdomen pelvis with p.o. and IV contrast shows no evidence of a surgical emergency but epiploic appendagitis noted. I consulted surgery and spoke to Dr. Huey Singh who states that he does not believe this is a surgical pathology and recommends pain control and follow-up on outpatient basis. Of note Dr. Huey Singh states he does not believe there is a role for antibiotics. I relayed this information back to the patient and he states that he recently has been having upper respiratory symptoms and feels like he needs an antibiotic for that. In light of that I will honor his request and prescribe him Augmentin as this will cover his upper respiratory symptoms and is an appropriate medication in the event this is diverticulitis in early form is not appreciated by CT however that will be appreciated when GI evaluates him possibly does a colonoscopy. I will refer him to GI for colonoscopy and will discharge him with Augmentin per the patient's request for his URI and in dual purpose to cover intra-abdominal infection. I have offered him prescriptions for medication including Toradol but he declined stating he is fine at home Tylenol. I estimate there is LOW risk for ACUTE APPENDICITIS, BOWEL OBSTRUCTION, CHOLECYSTITIS, DIVERTICULITIS, INCARCERATED HERNIA, PANCREATITIS, PERFORATED BOWEL, BOWEL ISCHEMIA, GONADAL TORSION, OR CARDIAC ISCHEMIA, thus I consider the discharge disposition reasonable. Also, there is no evidence or peritonitis, sepsis, or toxicity. Discussed results, diagnosis and plan with patient and/or family. Questions addressed. Disposition and follow-up agreed upon. Specific discharge instructions explained. The patient and/or family and I have discussed the diagnosis and risks, and we agree with discharging home to follow-up with their primary care, specialist or referral doctor.   In the event that medications were prescribed the risk profile of these medications were detailed expressly. We also discussed returning to the Emergency Department immediately if new or worsening symptoms occur. We have discussed the symptoms which are most concerning that necessitate immediate return. Will follow-up with GI and has been referred to surgery    Old records reviewed. Labs and imaging reviewed and results discussed with patient. .        Patient was given scripts for the following medications. I counseled patient how to take these medications. New Prescriptions    No medications on file         CRITICAL CARE TIME   Total Critical Care time was 0 minutes, excluding separately reportable procedures. There was a high probability of clinically significant/life threatening deterioration in the patient's condition which required my urgent intervention.       Clinical Impression:  Left lower quadrant abdominal pain, blood in stool, epiploic appendagitis  (Please note that portions of this note may have been completed with a voice recognition program. Efforts were made to edit the dictations but occasionally words are mis-transcribed.)    MD Lalita Salazar MD  10/30/20 9306

## 2020-10-30 NOTE — ED NOTES
Patient refused rectal exam per Dr. Major Moore.  Verbal order per Dr. Major Moore to cancel occult stool test.    Heena St, student     Heena St  10/30/20 0823

## 2020-10-30 NOTE — ED NOTES
Discharge instructions reviewed. Patient verbalized understanding. Patient will follow up with GI. Prescription x1 given.        Portillo Luevano RN  10/30/20 9873

## 2020-11-03 ENCOUNTER — TELEPHONE (OUTPATIENT)
Dept: FAMILY MEDICINE CLINIC | Age: 50
End: 2020-11-03

## 2020-11-03 RX ORDER — DEXTROAMPHETAMINE SACCHARATE, AMPHETAMINE ASPARTATE, DEXTROAMPHETAMINE SULFATE AND AMPHETAMINE SULFATE 7.5; 7.5; 7.5; 7.5 MG/1; MG/1; MG/1; MG/1
30 TABLET ORAL DAILY
Qty: 30 TABLET | Refills: 0 | Status: SHIPPED | OUTPATIENT
Start: 2020-11-03 | End: 2020-11-30 | Stop reason: SDUPTHER

## 2020-11-03 NOTE — TELEPHONE ENCOUNTER
Pt advised and voiced understanding. He did apologize and does have intentions to make it to his apt on 11/10/20.

## 2020-11-03 NOTE — TELEPHONE ENCOUNTER
I called it in as it is due tomorrow but if he does not come in I will dismiss him as a patient you will need to tell him that I would strongly suggest that he make the appointment

## 2020-11-03 NOTE — TELEPHONE ENCOUNTER
Last ov 6.18.20  Had appts on 9/17/20 and 10/21/20 both cancelled    Next scheduled appt is 11/10/20

## 2020-11-04 ENCOUNTER — TELEPHONE (OUTPATIENT)
Dept: FAMILY MEDICINE CLINIC | Age: 50
End: 2020-11-04

## 2020-11-10 ENCOUNTER — OFFICE VISIT (OUTPATIENT)
Dept: FAMILY MEDICINE CLINIC | Age: 50
End: 2020-11-10
Payer: COMMERCIAL

## 2020-11-10 VITALS
RESPIRATION RATE: 16 BRPM | WEIGHT: 215 LBS | BODY MASS INDEX: 31.75 KG/M2 | HEART RATE: 76 BPM | TEMPERATURE: 97.8 F | DIASTOLIC BLOOD PRESSURE: 68 MMHG | OXYGEN SATURATION: 96 % | SYSTOLIC BLOOD PRESSURE: 122 MMHG

## 2020-11-10 PROCEDURE — G8427 DOCREV CUR MEDS BY ELIG CLIN: HCPCS | Performed by: INTERNAL MEDICINE

## 2020-11-10 PROCEDURE — 90686 IIV4 VACC NO PRSV 0.5 ML IM: CPT | Performed by: INTERNAL MEDICINE

## 2020-11-10 PROCEDURE — 3017F COLORECTAL CA SCREEN DOC REV: CPT | Performed by: INTERNAL MEDICINE

## 2020-11-10 PROCEDURE — 99213 OFFICE O/P EST LOW 20 MIN: CPT | Performed by: INTERNAL MEDICINE

## 2020-11-10 PROCEDURE — G8417 CALC BMI ABV UP PARAM F/U: HCPCS | Performed by: INTERNAL MEDICINE

## 2020-11-10 PROCEDURE — 90471 IMMUNIZATION ADMIN: CPT | Performed by: INTERNAL MEDICINE

## 2020-11-10 PROCEDURE — G8482 FLU IMMUNIZE ORDER/ADMIN: HCPCS | Performed by: INTERNAL MEDICINE

## 2020-11-10 PROCEDURE — 4004F PT TOBACCO SCREEN RCVD TLK: CPT | Performed by: INTERNAL MEDICINE

## 2020-11-10 ASSESSMENT — ENCOUNTER SYMPTOMS
COUGH: 0
SHORTNESS OF BREATH: 0
VOMITING: 0
NAUSEA: 0
DIARRHEA: 0

## 2020-11-10 NOTE — PROGRESS NOTES
SUBJECTIVE:  Jon Darnell is a 44 y.o. male being evaluated for:    Chief Complaint   Patient presents with    ADHD     Patient here for a follow up on adhd for refills on adderall       HPI   ER on 10/30  LLQ pain and bloody stools  No events other than that day     Allergies   Allergen Reactions    Lisinopril Other (See Comments)     Cough     Current Outpatient Medications   Medication Sig Dispense Refill    amphetamine-dextroamphetamine (ADDERALL, 30MG,) 30 MG tablet Take 1 tablet by mouth daily for 30 days. 30 tablet 0    fluticasone (FLONASE) 50 MCG/ACT nasal spray SPRAY 1 SPRAY INTO EACH NOSTRIL EVERY DAY 1 Bottle 5    naproxen sodium (ALEVE) 220 MG tablet Take 2.5 tablets by mouth 2 times daily (with meals) (Patient taking differently: Take 550 mg by mouth as needed ) 40 tablet 0    Omega-3 Fatty Acids (FISH OIL) 1000 MG CAPS Take 2,000 mg by mouth daily       Loratadine (CLARITIN PO) Take 10 mg by mouth daily as needed (allergies)        No current facility-administered medications for this visit.           Social History     Socioeconomic History    Marital status:      Spouse name: Not on file    Number of children: Not on file    Years of education: Not on file    Highest education level: Not on file   Occupational History    Not on file   Social Needs    Financial resource strain: Not on file    Food insecurity     Worry: Not on file     Inability: Not on file    Transportation needs     Medical: Not on file     Non-medical: Not on file   Tobacco Use    Smoking status: Current Every Day Smoker     Packs/day: 1.00     Years: 35.00     Pack years: 35.00     Types: Cigarettes     Start date: 1/1/1988    Smokeless tobacco: Never Used   Substance and Sexual Activity    Alcohol use: Yes     Comment: occassional    Drug use: Never    Sexual activity: Yes     Partners: Female   Lifestyle    Physical activity     Days per week: Not on file     Minutes per session: Not on file   

## 2020-11-10 NOTE — PROGRESS NOTES
Vaccine Information Sheet, \"Influenza - Inactivated\"  given to Jc Saucedo, or parent/legal guardian of  Jc Saucedo and verbalized understanding. Patient responses:    Have you ever had a reaction to a flu vaccine? No  Do you have any current illness? No  Have you ever had Guillian Hague Syndrome? No  Do you have a serious allergy to any of the follow: Neomycin, Polymyxin, Thimerosal, eggs or egg products? No    Flu vaccine given per order. Please see immunization tab. Risks and benefits explained. Current VIS given.

## 2020-11-18 ASSESSMENT — ENCOUNTER SYMPTOMS
ABDOMINAL PAIN: 1
BLOOD IN STOOL: 1

## 2020-11-30 RX ORDER — DEXTROAMPHETAMINE SACCHARATE, AMPHETAMINE ASPARTATE, DEXTROAMPHETAMINE SULFATE AND AMPHETAMINE SULFATE 7.5; 7.5; 7.5; 7.5 MG/1; MG/1; MG/1; MG/1
30 TABLET ORAL DAILY
Qty: 30 TABLET | Refills: 0 | Status: SHIPPED | OUTPATIENT
Start: 2020-12-01 | End: 2020-12-29 | Stop reason: SDUPTHER

## 2020-11-30 NOTE — TELEPHONE ENCOUNTER
Pt is needing a refill on   amphetamine-dextroamphetamine (ADDERALL, 30MG,) 30 MG tablet     [facility]/pharmacy #[id number] [facility] -  [phone]

## 2020-12-24 ENCOUNTER — OFFICE VISIT (OUTPATIENT)
Dept: FAMILY MEDICINE CLINIC | Age: 50
End: 2020-12-24
Payer: COMMERCIAL

## 2020-12-24 VITALS
SYSTOLIC BLOOD PRESSURE: 132 MMHG | HEART RATE: 66 BPM | DIASTOLIC BLOOD PRESSURE: 76 MMHG | OXYGEN SATURATION: 97 % | TEMPERATURE: 98.4 F | BODY MASS INDEX: 31.75 KG/M2 | WEIGHT: 215 LBS

## 2020-12-24 PROCEDURE — 99214 OFFICE O/P EST MOD 30 MIN: CPT | Performed by: INTERNAL MEDICINE

## 2020-12-24 PROCEDURE — 4004F PT TOBACCO SCREEN RCVD TLK: CPT | Performed by: INTERNAL MEDICINE

## 2020-12-24 PROCEDURE — 4130F TOPICAL PREP RX AOE: CPT | Performed by: INTERNAL MEDICINE

## 2020-12-24 PROCEDURE — G8427 DOCREV CUR MEDS BY ELIG CLIN: HCPCS | Performed by: INTERNAL MEDICINE

## 2020-12-24 PROCEDURE — G8417 CALC BMI ABV UP PARAM F/U: HCPCS | Performed by: INTERNAL MEDICINE

## 2020-12-24 PROCEDURE — G8482 FLU IMMUNIZE ORDER/ADMIN: HCPCS | Performed by: INTERNAL MEDICINE

## 2020-12-24 PROCEDURE — 3017F COLORECTAL CA SCREEN DOC REV: CPT | Performed by: INTERNAL MEDICINE

## 2020-12-24 RX ORDER — AMOXICILLIN AND CLAVULANATE POTASSIUM 875; 125 MG/1; MG/1
1 TABLET, FILM COATED ORAL 2 TIMES DAILY WITH MEALS
Qty: 20 TABLET | Refills: 0 | Status: SHIPPED | OUTPATIENT
Start: 2020-12-24 | End: 2021-01-03

## 2020-12-24 RX ORDER — COLISTIN SULFATE, NEOMYCIN SULFATE, THONZONIUM BROMIDE AND HYDROCORTISONE ACETATE 3; 3.3; .5; 1 MG/ML; MG/ML; MG/ML; MG/ML
3 SUSPENSION AURICULAR (OTIC) 3 TIMES DAILY
Qty: 1 BOTTLE | Refills: 1 | Status: SHIPPED | OUTPATIENT
Start: 2020-12-24 | End: 2020-12-24

## 2020-12-24 RX ORDER — CIPROFLOXACIN/HYDROCORTISONE 0.2 %-1 %
3 SUSPENSION, DROPS(FINAL DOSAGE FORM)(ML) OTIC (EAR) 2 TIMES DAILY
Qty: 1 BOTTLE | Refills: 0 | Status: SHIPPED | OUTPATIENT
Start: 2020-12-24 | End: 2020-12-31

## 2020-12-24 ASSESSMENT — ENCOUNTER SYMPTOMS
COUGH: 1
WHEEZING: 0
GASTROINTESTINAL NEGATIVE: 1
SINUS PAIN: 0

## 2020-12-24 NOTE — PROGRESS NOTES
Subjective:      Patient ID: Lynnette Tran is a 44 y.o. male. Patient presents with:  Oral Swelling: white blisters on back of throat, throat a little sore today    Lynnette Tran is a 44 y.o. male with the following history as recorded in [facility]:  Patient Active Problem List    Attention deficit hyperactivity disorder (ADHD), predominantly inattentive type         Date Noted: 06/18/2020      Elevated blood-pressure reading, without diagnosis of hypertension         Date Noted: 12/02/2019      Current Outpatient Medications:    amphetamine-dextroamphetamine (ADDERALL, 30MG,) 30 MG tablet, Take 1 tablet by mouth daily for 30 days. , Disp: 30 tablet, Rfl: 0    naproxen sodium (ALEVE) 220 MG tablet, Take 2.5 tablets by mouth 2 times daily (with meals) (Patient taking differently: Take 550 mg by mouth as needed ), Disp: 40 tablet, Rfl: 0    Omega-3 Fatty Acids (FISH OIL) 1000 MG CAPS, Take 2,000 mg by mouth daily , Disp: , Rfl:     Loratadine (CLARITIN PO), Take 10 mg by mouth daily as needed (allergies) , Disp: , Rfl:     No current facility-administered medications for this visit. Allergies: Lisinopril  Past Medical History:  No date: ADD (attention deficit disorder)  No date: Depression  No date: Hypertension  Past Surgical History:  No date: FRACTURE SURGERY      Comment:  right wrist  No date: WISDOM TOOTH EXTRACTION  Review of patient's family history indicates:  Problem: Kidney Cancer      Relation: Mother          Age of Onset: (Not Specified)  Problem: High Cholesterol      Relation: Father          Age of Onset: (Not Specified)  Problem:  Allergy (Severe)      Relation: Sister          Age of Onset: (Not Specified)  Problem: No Known Problems      Relation: Brother          Age of Onset: (Not Specified)  Problem: Diabetes      Relation: Maternal Aunt          Age of Onset: (Not Specified)  Problem: Diabetes      Relation: Maternal Uncle          Age of Onset: (Not Specified)  Problem: No Known Rate and Rhythm: Normal rate and regular rhythm. Heart sounds: Normal heart sounds. No murmur. No friction rub. No gallop. Pulmonary:      Effort: Pulmonary effort is normal. No respiratory distress. Breath sounds: Normal breath sounds. No stridor. No wheezing or rales. Chest:      Chest wall: No tenderness. Abdominal:      General: Bowel sounds are normal. There is no distension. Palpations: Abdomen is soft. There is no mass. Tenderness: There is no abdominal tenderness. There is no guarding or rebound. Genitourinary:     Penis: Normal. No tenderness. Prostate: Normal.      Rectum: Normal. Guaiac result negative. Musculoskeletal:         General: No tenderness. Lymphadenopathy:      Cervical: No cervical adenopathy. Skin:     General: Skin is warm and dry. Coloration: Skin is not pale. Findings: No erythema or rash. Neurological:      Cranial Nerves: No cranial nerve deficit. Motor: No abnormal muscle tone. Coordination: Coordination normal.      Deep Tendon Reflexes: Reflexes are normal and symmetric. Reflexes normal.   Psychiatric:         Behavior: Behavior normal.         Thought Content: Thought content normal.         Judgment: Judgment normal.         Assessment:      Encounter Diagnoses   Name Primary?  Acute streptococcal pharyngitis Yes    Acute diffuse otitis externa of right ear            Plan:      Shira Cifuentes was seen today for oral swelling. Diagnoses and all orders for this visit:    Acute streptococcal pharyngitis    Acute diffuse otitis externa of right ear    Other orders  -     neomycin-colistin-hydrocortisone-thonzonium (CORTISPORIN-TC) 3.3-3-10-0.5 MG/ML otic suspension; Place 3 drops into the right ear 3 times daily for 7 days  -     amoxicillin-clavulanate (AUGMENTIN) 875-125 MG per tablet;  Take 1 tablet by mouth 2 times daily (with meals) for 10 days              Eva Leroy MD

## 2020-12-24 NOTE — TELEPHONE ENCOUNTER
Cortisporin ear omar not covered. Pharm asking for alternative med. Pt is also wanting to know if he can donate plasma today.

## 2020-12-29 ENCOUNTER — TELEPHONE (OUTPATIENT)
Dept: FAMILY MEDICINE CLINIC | Age: 50
End: 2020-12-29

## 2020-12-29 RX ORDER — DEXTROAMPHETAMINE SACCHARATE, AMPHETAMINE ASPARTATE, DEXTROAMPHETAMINE SULFATE AND AMPHETAMINE SULFATE 7.5; 7.5; 7.5; 7.5 MG/1; MG/1; MG/1; MG/1
30 TABLET ORAL DAILY
Qty: 30 TABLET | Refills: 0 | Status: SHIPPED | OUTPATIENT
Start: 2020-12-30 | End: 2021-01-04 | Stop reason: SDUPTHER

## 2020-12-29 NOTE — TELEPHONE ENCOUNTER
Pt is needing a refill on   amphetamine-dextroamphetamine (ADDERALL, 30MG,) 30 MG tablet     [id number] [phone]

## 2021-01-04 DIAGNOSIS — F90.0 ATTENTION DEFICIT HYPERACTIVITY DISORDER (ADHD), PREDOMINANTLY INATTENTIVE TYPE: ICD-10-CM

## 2021-01-04 RX ORDER — DEXTROAMPHETAMINE SACCHARATE, AMPHETAMINE ASPARTATE, DEXTROAMPHETAMINE SULFATE AND AMPHETAMINE SULFATE 7.5; 7.5; 7.5; 7.5 MG/1; MG/1; MG/1; MG/1
30 TABLET ORAL DAILY
Qty: 30 TABLET | Refills: 0 | Status: SHIPPED | OUTPATIENT
Start: 2021-01-04 | End: 2021-01-28 | Stop reason: SDUPTHER

## 2021-01-28 ENCOUNTER — TELEPHONE (OUTPATIENT)
Dept: FAMILY MEDICINE CLINIC | Age: 51
End: 2021-01-28

## 2021-01-28 DIAGNOSIS — F90.0 ATTENTION DEFICIT HYPERACTIVITY DISORDER (ADHD), PREDOMINANTLY INATTENTIVE TYPE: ICD-10-CM

## 2021-01-28 RX ORDER — DEXTROAMPHETAMINE SACCHARATE, AMPHETAMINE ASPARTATE, DEXTROAMPHETAMINE SULFATE AND AMPHETAMINE SULFATE 7.5; 7.5; 7.5; 7.5 MG/1; MG/1; MG/1; MG/1
30 TABLET ORAL DAILY
Qty: 30 TABLET | Refills: 0 | Status: SHIPPED | OUTPATIENT
Start: 2021-02-01 | End: 2021-02-26 | Stop reason: SDUPTHER

## 2021-01-28 NOTE — TELEPHONE ENCOUNTER
Pt is needing a refill on   amphetamine-dextroamphetamine (ADDERALL, 30MG,) 30 MG tablet      [facility]/pharmacy #[id number] [facility] [phone]       LOV-12/24/20

## 2021-01-28 NOTE — TELEPHONE ENCOUNTER
Due for visit in march Detail Level: Detailed When Should The Patient Follow-Up For Their Next Full-Body Skin Exam?: 3 Months Quality 137: Melanoma: Continuity Of Care - Recall System: Patient information entered into a recall system that includes: target date for the next exam specified AND a process to follow up with patients regarding missed or unscheduled appointments Detail Level: Zone

## 2021-02-26 DIAGNOSIS — F90.0 ATTENTION DEFICIT HYPERACTIVITY DISORDER (ADHD), PREDOMINANTLY INATTENTIVE TYPE: ICD-10-CM

## 2021-02-26 NOTE — TELEPHONE ENCOUNTER
Pt is needing a refill on   amphetamine-dextroamphetamine (ADDERALL, 30MG,) 30 MG tablet     He is also needing Flonase called  back into the pharmacy   He hasnt taken since Fall but this weather is driving his sinus' crazy.     [facility]/pharmacy #[id number] [address]is[address] - F [phone]

## 2021-03-01 RX ORDER — DEXTROAMPHETAMINE SACCHARATE, AMPHETAMINE ASPARTATE, DEXTROAMPHETAMINE SULFATE AND AMPHETAMINE SULFATE 7.5; 7.5; 7.5; 7.5 MG/1; MG/1; MG/1; MG/1
30 TABLET ORAL DAILY
Qty: 30 TABLET | Refills: 0 | Status: SHIPPED | OUTPATIENT
Start: 2021-03-01 | End: 2021-03-22 | Stop reason: SDUPTHER

## 2021-03-01 RX ORDER — FLUTICASONE PROPIONATE 50 MCG
SPRAY, SUSPENSION (ML) NASAL
Qty: 1 BOTTLE | Refills: 5 | Status: SHIPPED | OUTPATIENT
Start: 2021-03-01 | End: 2021-06-11

## 2021-03-19 DIAGNOSIS — Z51.81 MEDICATION MONITORING ENCOUNTER: ICD-10-CM

## 2021-03-19 DIAGNOSIS — Z12.5 PROSTATE CANCER SCREENING: ICD-10-CM

## 2021-03-19 DIAGNOSIS — R73.9 HYPERGLYCEMIA: ICD-10-CM

## 2021-03-19 DIAGNOSIS — Z13.220 SCREENING CHOLESTEROL LEVEL: ICD-10-CM

## 2021-03-19 LAB
HCT VFR BLD CALC: 51.7 % (ref 40.5–52.5)
HEMOGLOBIN: 17.1 G/DL (ref 13.5–17.5)
MCH RBC QN AUTO: 31.5 PG (ref 26–34)
MCHC RBC AUTO-ENTMCNC: 33.1 G/DL (ref 31–36)
MCV RBC AUTO: 95.2 FL (ref 80–100)
PDW BLD-RTO: 15.1 % (ref 12.4–15.4)
PLATELET # BLD: 327 K/UL (ref 135–450)
PMV BLD AUTO: 7.5 FL (ref 5–10.5)
RBC # BLD: 5.44 M/UL (ref 4.2–5.9)
WBC # BLD: 11.6 K/UL (ref 4–11)

## 2021-03-20 LAB
A/G RATIO: 1.4 (ref 1.1–2.2)
ALBUMIN SERPL-MCNC: 3.8 G/DL (ref 3.4–5)
ALP BLD-CCNC: 57 U/L (ref 40–129)
ALT SERPL-CCNC: 22 U/L (ref 10–40)
ANION GAP SERPL CALCULATED.3IONS-SCNC: 11 MMOL/L (ref 3–16)
AST SERPL-CCNC: 24 U/L (ref 15–37)
BILIRUB SERPL-MCNC: 0.3 MG/DL (ref 0–1)
BUN BLDV-MCNC: 11 MG/DL (ref 7–20)
CALCIUM SERPL-MCNC: 8.9 MG/DL (ref 8.3–10.6)
CHLORIDE BLD-SCNC: 102 MMOL/L (ref 99–110)
CHOLESTEROL, TOTAL: 181 MG/DL (ref 0–199)
CO2: 26 MMOL/L (ref 21–32)
CREAT SERPL-MCNC: 1.2 MG/DL (ref 0.9–1.3)
ESTIMATED AVERAGE GLUCOSE: 105.4 MG/DL
GFR AFRICAN AMERICAN: >60
GFR NON-AFRICAN AMERICAN: >60
GLOBULIN: 2.8 G/DL
GLUCOSE BLD-MCNC: 100 MG/DL (ref 70–99)
HBA1C MFR BLD: 5.3 %
HDLC SERPL-MCNC: 28 MG/DL (ref 40–60)
LDL CHOLESTEROL CALCULATED: ABNORMAL MG/DL
LDL CHOLESTEROL DIRECT: 89 MG/DL
POTASSIUM SERPL-SCNC: 4.5 MMOL/L (ref 3.5–5.1)
PROSTATE SPECIFIC ANTIGEN: 0.67 NG/ML (ref 0–4)
SODIUM BLD-SCNC: 139 MMOL/L (ref 136–145)
TOTAL PROTEIN: 6.6 G/DL (ref 6.4–8.2)
TRIGL SERPL-MCNC: 372 MG/DL (ref 0–150)
VLDLC SERPL CALC-MCNC: ABNORMAL MG/DL

## 2021-03-22 ENCOUNTER — OFFICE VISIT (OUTPATIENT)
Dept: FAMILY MEDICINE CLINIC | Age: 51
End: 2021-03-22
Payer: COMMERCIAL

## 2021-03-22 VITALS
SYSTOLIC BLOOD PRESSURE: 124 MMHG | WEIGHT: 205.4 LBS | OXYGEN SATURATION: 95 % | HEIGHT: 69 IN | BODY MASS INDEX: 30.42 KG/M2 | DIASTOLIC BLOOD PRESSURE: 76 MMHG | HEART RATE: 91 BPM | TEMPERATURE: 98.4 F

## 2021-03-22 DIAGNOSIS — Z51.81 MEDICATION MONITORING ENCOUNTER: ICD-10-CM

## 2021-03-22 DIAGNOSIS — Z00.00 PHYSICAL EXAM, ANNUAL: Primary | ICD-10-CM

## 2021-03-22 DIAGNOSIS — Z12.11 COLON CANCER SCREENING: ICD-10-CM

## 2021-03-22 DIAGNOSIS — F90.0 ATTENTION DEFICIT HYPERACTIVITY DISORDER (ADHD), PREDOMINANTLY INATTENTIVE TYPE: ICD-10-CM

## 2021-03-22 DIAGNOSIS — H60.313 ACUTE DIFFUSE OTITIS EXTERNA OF BOTH EARS: ICD-10-CM

## 2021-03-22 PROCEDURE — G8482 FLU IMMUNIZE ORDER/ADMIN: HCPCS | Performed by: INTERNAL MEDICINE

## 2021-03-22 PROCEDURE — 99396 PREV VISIT EST AGE 40-64: CPT | Performed by: INTERNAL MEDICINE

## 2021-03-22 RX ORDER — DEXTROAMPHETAMINE SACCHARATE, AMPHETAMINE ASPARTATE, DEXTROAMPHETAMINE SULFATE AND AMPHETAMINE SULFATE 7.5; 7.5; 7.5; 7.5 MG/1; MG/1; MG/1; MG/1
30 TABLET ORAL DAILY
Qty: 30 TABLET | Refills: 0 | Status: SHIPPED | OUTPATIENT
Start: 2021-03-30 | End: 2021-04-26 | Stop reason: SDUPTHER

## 2021-03-22 RX ORDER — OFLOXACIN 3 MG/ML
5 SOLUTION AURICULAR (OTIC) 2 TIMES DAILY
Qty: 5 ML | Refills: 0 | Status: SHIPPED | OUTPATIENT
Start: 2021-03-22 | End: 2021-04-01

## 2021-03-22 ASSESSMENT — ENCOUNTER SYMPTOMS
CONSTIPATION: 0
VOICE CHANGE: 0
SHORTNESS OF BREATH: 0
BACK PAIN: 0
COUGH: 0
ROS SKIN COMMENTS: NO CONCERNING SKIN LESION
VOMITING: 0
DIARRHEA: 0
SINUS PRESSURE: 0
RHINORRHEA: 1
ABDOMINAL PAIN: 0
NAUSEA: 0
BLOOD IN STOOL: 0
TROUBLE SWALLOWING: 0
SORE THROAT: 0

## 2021-03-22 ASSESSMENT — PATIENT HEALTH QUESTIONNAIRE - PHQ9
SUM OF ALL RESPONSES TO PHQ QUESTIONS 1-9: 0
SUM OF ALL RESPONSES TO PHQ QUESTIONS 1-9: 0
2. FEELING DOWN, DEPRESSED OR HOPELESS: 0
SUM OF ALL RESPONSES TO PHQ9 QUESTIONS 1 & 2: 0

## 2021-03-22 NOTE — PROGRESS NOTES
SUBJECTIVE:  Waldron Ormond is a 44 y.o. male being evaluated for:    Chief Complaint   Patient presents with    Annual Exam     CPE       HPI   Doing well here for physical  LOst 30 # and [place]      Issures with ears draining  More frequent over last couple of years  Trouble hearing right earNo dizzy or light headed  Chronic sinus problems  Stable      Still smoking and at this time has to get it in his head to quit  No tready   Allergies   Allergen Reactions    Lisinopril Other (See Comments)     Cough     Current Outpatient Medications   Medication Sig Dispense Refill    ofloxacin (FLOXIN OTIC) 0.3 % otic solution Place 5 drops into both ears 2 times daily for 10 days 5 mL 0    [START ON 3/30/2021] amphetamine-dextroamphetamine (ADDERALL, 30MG,) 30 MG tablet Take 1 tablet by mouth daily for 30 days. 30 tablet 0    fluticasone (FLONASE) 50 MCG/ACT nasal spray SPRAY 1 SPRAY INTO EACH NOSTRIL EVERY DAY 1 Bottle 5    naproxen sodium (ALEVE) 220 MG tablet Take 2.5 tablets by mouth 2 times daily (with meals) (Patient taking differently: Take 550 mg by mouth as needed ) 40 tablet 0    Omega-3 Fatty Acids (FISH OIL) 1000 MG CAPS Take 2,000 mg by mouth daily       Loratadine (CLARITIN PO) Take 10 mg by mouth daily as needed (allergies)        No current facility-administered medications for this visit.           Social History     Socioeconomic History    Marital status:      Spouse name: Not on file    Number of children: Not on file    Years of education: Not on file    Highest education level: Not on file   Occupational History    Not on file   Social Needs    Financial resource strain: Patient refused    Food insecurity     Worry: Patient refused     Inability: Patient refused    Transportation needs     Medical: Patient refused     Non-medical: Patient refused   Tobacco Use    Smoking status: Current Every Day Smoker     Packs/day: 1.00     Years: 35.00     Pack visual disturbance. Respiratory: Negative for cough and shortness of breath. Cardiovascular: Negative for chest pain, palpitations and leg swelling. Gastrointestinal: Negative for abdominal pain, blood in stool, constipation, diarrhea, nausea and vomiting. Endocrine: Negative for cold intolerance and heat intolerance. Genitourinary: Negative for difficulty urinating. Musculoskeletal: Negative for arthralgias, back pain and neck pain. Skin:        No concerning skin lesion    Neurological: Negative for dizziness, light-headedness and headaches. Psychiatric/Behavioral: Positive for decreased concentration (doing well with medication ). Negative for agitation, behavioral problems, dysphoric mood, hallucinations and sleep disturbance. The patient is not nervous/anxious and is not hyperactive. OBJECTIVE:  /76 (Site: Left Upper Arm, Position: Sitting, Cuff Size: Medium Adult)   Pulse 91   Temp 98.4 °F (36.9 °C) (Temporal)   Ht 5' 9\" (1.753 m)   Wt 205 lb 6.4 oz (93.2 kg)   SpO2 95%   BMI 30.33 kg/m²      Body mass index is 30.33 kg/m². Physical Exam  Vitals signs and nursing note reviewed. Constitutional:       General: He is not in acute distress. Appearance: Normal appearance. He is well-developed. HENT:      Head: Normocephalic and atraumatic. Comments: Ear canals red worse on the right      Right Ear: Tympanic membrane normal.      Left Ear: Tympanic membrane normal.      Nose: Nose normal.      Mouth/Throat:      Pharynx: Oropharynx is clear. Eyes:      Conjunctiva/sclera: Conjunctivae normal.   Neck:      Musculoskeletal: Neck supple. Thyroid: No thyromegaly. Vascular: No carotid bruit. Cardiovascular:      Rate and Rhythm: Normal rate and regular rhythm. Heart sounds: Normal heart sounds. No murmur. No friction rub. No gallop. Pulmonary:      Effort: Pulmonary effort is normal.      Breath sounds: Normal breath sounds.    Chest:      Chest wall: No tenderness. Abdominal:      General: There is no distension. Palpations: Abdomen is soft. Tenderness: There is no abdominal tenderness. Comments: No HSM   Musculoskeletal:         General: No swelling. Lymphadenopathy:      Cervical: No cervical adenopathy. Skin:     General: Skin is warm and dry. Neurological:      General: No focal deficit present. Mental Status: He is alert. Gait: Gait normal.   Psychiatric:         Behavior: Behavior normal.         Thought Content: Thought content normal.         ASSESSMENT/PLAN:    Yash Maciel was seen today for annual exam.    Diagnoses and all orders for this visit:    Physical exam, annual    Acute diffuse otitis externa of both ears  Comments:  worse in the left   Orders:  -     ofloxacin (FLOXIN OTIC) 0.3 % otic solution; Place 5 drops into both ears 2 times daily for 10 days    Attention deficit hyperactivity disorder (ADHD), predominantly inattentive type oarrs reviewed  -     DRUG SCREEN MULTI URINE; Future  -     amphetamine-dextroamphetamine (ADDERALL, 30MG,) 30 MG tablet; Take 1 tablet by mouth daily for 30 days. Medication monitoring encounter  -     DRUG SCREEN MULTI URINE; Future    Colon cancer screening  -     Cologuard (For External Results Only); Future        Orders Placed This Encounter   Medications    ofloxacin (FLOXIN OTIC) 0.3 % otic solution     Sig: Place 5 drops into both ears 2 times daily for 10 days     Dispense:  5 mL     Refill:  0    amphetamine-dextroamphetamine (ADDERALL, 30MG,) 30 MG tablet     Sig: Take 1 tablet by mouth daily for 30 days. Dispense:  30 tablet     Refill:  0        Return in about 3 months (around 6/22/2021), or if symptoms worsen or fail to improve. Patient Instructions       Patient Education        Well Visit, Men 48 to 72: Care Instructions  Overview     Well visits can help you stay healthy.  Your doctor has checked your overall health and may have suggested ways to take good care of yourself. Your doctor also may have recommended tests. At home, you can help prevent illness with healthy eating, regular exercise, and other steps. Follow-up care is a key part of your treatment and safety. Be sure to make and go to all appointments, and call your doctor if you are having problems. It's also a good idea to know your test results and keep a list of the medicines you take. How can you care for yourself at home? · Get screening tests that you and your doctor decide on. Screening helps find diseases before any symptoms appear. · Eat healthy foods. Choose fruits, vegetables, whole grains, protein, and low-fat dairy foods. Limit fat, especially saturated fat. Reduce salt in your diet. · Limit alcohol. Have no more than 2 drinks a day or 14 drinks a week. · Get at least 30 minutes of exercise on most days of the week. Walking is a good choice. You also may want to do other activities, such as running, swimming, cycling, or playing tennis or team sports. · Reach and stay at a healthy weight. This will lower your risk for many problems, such as obesity, diabetes, heart disease, and high blood pressure. · Do not smoke. Smoking can make health problems worse. If you need help quitting, talk to your doctor about stop-smoking programs and medicines. These can increase your chances of quitting for good. · Care for your mental health. It is easy to get weighed down by worry and stress. Learn strategies to manage stress, like deep breathing and mindfulness, and stay connected with your family and community. If you find you often feel sad or hopeless, talk with your doctor. Treatment can help. · Talk to your doctor about whether you have any risk factors for sexually transmitted infections (STIs). You can help prevent STIs if you wait to have sex with a new partner (or partners) until you've each been tested for STIs.  It also helps if you use condoms (male or female condoms) and if you limit your sex partners to one person who only has sex with you. Vaccines are available for some STIs. · If it's important to you to prevent pregnancy with your partner, talk with your doctor about birth control options that might be best for you. · If you think you may have a problem with alcohol or drug use, talk to your doctor. This includes prescription medicines (such as amphetamines and opioids) and illegal drugs (such as cocaine and methamphetamine). Your doctor can help you figure out what type of treatment is best for you. · Protect your skin from too much sun. When you're outdoors from 10 a.m. to 4 p.m., stay in the shade or cover up with clothing and a hat with a wide brim. Wear sunglasses that block UV rays. Even when it's cloudy, put broad-spectrum sunscreen (SPF 30 or higher) on any exposed skin. · See a dentist one or two times a year for checkups and to have your teeth cleaned. · Wear a seat belt in the car. When should you call for help? Watch closely for changes in your health, and be sure to contact your doctor if you have any problems or symptoms that concern you. Where can you learn more? Go to https://[employer] Pointspepiceweb.[employer]partners. org and sign in to your [employer] account. Enter Z100 in the "[employer] box to learn more about \"Well Visit, Men 48 to 72: Care Instructions. \"     If you do not have an account, please click on the \"Sign Up Now\" link. Current as of: May 27, 2020               Content Version: 12.8  © 2006-2021 [employer]. Care instructions adapted under license by [facility] ([facility]). If you have questions about a medical condition or this instruction, always ask your healthcare professional. [place] [address] any warranty or liability for your use of this information.          Patient Education        Swimmer's Ear: Care Instructions  Your Care Instructions     Swimmer's ear (otitis externa) is inflammation or infection of the ear canal. This is the passage that leads from the outer ear to the eardrum. Any water, sand, or other debris that gets into the ear canal and stays there can cause swimmer's ear. Putting cotton swabs or other items in the ear to clean it can also cause this problem. Swimmer's ear can be very painful. But you can treat the pain and infection with medicines. You should feel better in a few days. Follow-up care is a key part of your treatment and safety. Be sure to make and go to all appointments, and call your doctor if you are having problems. It's also a good idea to know your test results and keep a list of the medicines you take. How can you care for yourself at home? Cleaning and care  · Use antibiotic drops as your doctor directs. · Do not insert ear drops (other than the antibiotic ear drops) or anything else into the ear unless your doctor has told you to. · Avoid getting water in the ear until the problem clears up. Use cotton lightly coated with petroleum jelly as an earplug. Do not use plastic earplugs. · Use a hair dryer set on low to carefully dry the ear after you shower. · To ease ear pain, hold a warm washcloth against your ear. · Take pain medicines exactly as directed. ? If the doctor gave you a prescription medicine for pain, take it as prescribed. ? If you are not taking a prescription pain medicine, ask your doctor if you can take an over-the-counter medicine. Inserting ear drops  · Warm the drops to body temperature by rolling the container in your hands. Or you can place it in a cup of warm water for a few minutes. · Lie down, with your ear facing up. · Place drops inside the ear. Follow your doctor's instructions (or the directions on the label) for how many drops to use. Gently wiggle the outer ear or pull the ear up and back to help the drops get into the ear. · It's important to keep the liquid in the ear canal for 3 to 5 minutes. When should you call for help?    Call your doctor now

## 2021-03-22 NOTE — PATIENT INSTRUCTIONS
Patient Education        Well Visit, Men 48 to 72: Care Instructions  Overview     Well visits can help you stay healthy. Your doctor has checked your overall health and may have suggested ways to take good care of yourself. Your doctor also may have recommended tests. At home, you can help prevent illness with healthy eating, regular exercise, and other steps. Follow-up care is a key part of your treatment and safety. Be sure to make and go to all appointments, and call your doctor if you are having problems. It's also a good idea to know your test results and keep a list of the medicines you take. How can you care for yourself at home? · Get screening tests that you and your doctor decide on. Screening helps find diseases before any symptoms appear. · Eat healthy foods. Choose fruits, vegetables, whole grains, protein, and low-fat dairy foods. Limit fat, especially saturated fat. Reduce salt in your diet. · Limit alcohol. Have no more than 2 drinks a day or 14 drinks a week. · Get at least 30 minutes of exercise on most days of the week. Walking is a good choice. You also may want to do other activities, such as running, swimming, cycling, or playing tennis or team sports. · Reach and stay at a healthy weight. This will lower your risk for many problems, such as obesity, diabetes, heart disease, and high blood pressure. · Do not smoke. Smoking can make health problems worse. If you need help quitting, talk to your doctor about stop-smoking programs and medicines. These can increase your chances of quitting for good. · Care for your mental health. It is easy to get weighed down by worry and stress. Learn strategies to manage stress, like deep breathing and mindfulness, and stay connected with your family and community. If you find you often feel sad or hopeless, talk with your doctor. Treatment can help.   · Talk to your doctor about whether you have any risk factors for sexually transmitted infections your use of this information. Patient Education        Swimmer's Ear: Care Instructions  Your Care Instructions     Swimmer's ear (otitis externa) is inflammation or infection of the ear canal. This is the passage that leads from the outer ear to the eardrum. Any water, sand, or other debris that gets into the ear canal and stays there can cause swimmer's ear. Putting cotton swabs or other items in the ear to clean it can also cause this problem. Swimmer's ear can be very painful. But you can treat the pain and infection with medicines. You should feel better in a few days. Follow-up care is a key part of your treatment and safety. Be sure to make and go to all appointments, and call your doctor if you are having problems. It's also a good idea to know your test results and keep a list of the medicines you take. How can you care for yourself at home? Cleaning and care  · Use antibiotic drops as your doctor directs. · Do not insert ear drops (other than the antibiotic ear drops) or anything else into the ear unless your doctor has told you to. · Avoid getting water in the ear until the problem clears up. Use cotton lightly coated with petroleum jelly as an earplug. Do not use plastic earplugs. · Use a hair dryer set on low to carefully dry the ear after you shower. · To ease ear pain, hold a warm washcloth against your ear. · Take pain medicines exactly as directed. ? If the doctor gave you a prescription medicine for pain, take it as prescribed. ? If you are not taking a prescription pain medicine, ask your doctor if you can take an over-the-counter medicine. Inserting ear drops  · Warm the drops to body temperature by rolling the container in your hands. Or you can place it in a cup of warm water for a few minutes. · Lie down, with your ear facing up. · Place drops inside the ear. Follow your doctor's instructions (or the directions on the label) for how many drops to use.  Gently wiggle the outer ear or pull the ear up and back to help the drops get into the ear. · It's important to keep the liquid in the ear canal for 3 to 5 minutes. When should you call for help? Call your doctor now or seek immediate medical care if:    · You have a new or higher fever.     · You have new or worse pain, swelling, warmth, or redness around or behind your ear.     · You have new or increasing pus or blood draining from your ear. Watch closely for changes in your health, and be sure to contact your doctor if:    · You are not getting better after 2 days (48 hours). Where can you learn more? Go to https://Tower Travel CenterpeU-NOTE.Metagenics. org and sign in to your [employer] account. Enter C706 in the AppsBuilder box to learn more about \"Swimmer's Ear: Care Instructions. \"     If you do not have an account, please click on the \"Sign Up Now\" link. Current as of: December 2, 2020               Content Version: 12.8  © 2006-2021 Healthwise, [facility]. Care instructions adapted under license by [facility] ([facility]). If you have questions about a medical condition or this instruction, always ask your healthcare professional. [place] [address] any warranty or liability for your use of this information.

## 2021-04-02 ENCOUNTER — TELEPHONE (OUTPATIENT)
Dept: FAMILY MEDICINE CLINIC | Age: 51
End: 2021-04-02

## 2021-04-02 NOTE — TELEPHONE ENCOUNTER
Patient was in to see Dr Benita Rodriguez last week. Since he has developed congestion in head & itchy eyes. He would like to know if you would call in a steroid for him. Has taken in past and worked great.     [phone]    PT# [phone]

## 2021-04-13 ENCOUNTER — TELEPHONE (OUTPATIENT)
Dept: FAMILY MEDICINE CLINIC | Age: 51
End: 2021-04-13

## 2021-04-13 RX ORDER — NICOTINE 21 MG/24HR
1 PATCH, TRANSDERMAL 24 HOURS TRANSDERMAL DAILY
Qty: 30 PATCH | Refills: 0 | Status: SHIPPED | OUTPATIENT
Start: 2021-04-13 | End: 2021-05-19

## 2021-04-13 NOTE — TELEPHONE ENCOUNTER
----- Message from Edil Ruvalcaba MD sent at 4/12/2021  6:18 PM EDT -----  Regarding: cologuard  Please let Patient know that his Cologuard screening test is negative

## 2021-04-26 ENCOUNTER — TELEPHONE (OUTPATIENT)
Dept: FAMILY MEDICINE CLINIC | Age: 51
End: 2021-04-26

## 2021-04-26 DIAGNOSIS — F90.0 ATTENTION DEFICIT HYPERACTIVITY DISORDER (ADHD), PREDOMINANTLY INATTENTIVE TYPE: ICD-10-CM

## 2021-04-26 RX ORDER — DEXTROAMPHETAMINE SACCHARATE, AMPHETAMINE ASPARTATE, DEXTROAMPHETAMINE SULFATE AND AMPHETAMINE SULFATE 7.5; 7.5; 7.5; 7.5 MG/1; MG/1; MG/1; MG/1
30 TABLET ORAL DAILY
Qty: 30 TABLET | Refills: 0 | Status: SHIPPED | OUTPATIENT
Start: 2021-04-26 | End: 2021-06-01 | Stop reason: SDUPTHER

## 2021-04-26 NOTE — TELEPHONE ENCOUNTER
Patient is requesting a refill on his   amphetamine-dextroamphetamine (ADDERALL, 30MG,) 30 MG tablet    [facility] [phone]    Patient [phone]      L. O.V   3.22.2021

## 2021-05-19 ENCOUNTER — HOSPITAL ENCOUNTER (EMERGENCY)
Age: 51
Discharge: HOME | End: 2021-05-19
Attending: EMERGENCY MEDICINE
Payer: COMMERCIAL

## 2021-05-19 VITALS
WEIGHT: 187.39 LBS | HEART RATE: 85 BPM | SYSTOLIC BLOOD PRESSURE: 151 MMHG | TEMPERATURE: 98.3 F | BODY MASS INDEX: 28.4 KG/M2 | HEIGHT: 68 IN | RESPIRATION RATE: 16 BRPM | DIASTOLIC BLOOD PRESSURE: 107 MMHG | OXYGEN SATURATION: 97 %

## 2021-05-19 DIAGNOSIS — K02.9 DENTAL CARIES: ICD-10-CM

## 2021-05-19 DIAGNOSIS — K08.89 PAIN, DENTAL: Primary | ICD-10-CM

## 2021-05-19 PROCEDURE — U0003 INFECTIOUS AGENT DETECTION BY NUCLEIC ACID (DNA OR RNA); SEVERE ACUTE RESPIRATORY SYNDROME CORONAVIRUS 2 (SARS-COV-2) (CORONAVIRUS DISEASE [COVID-19]), AMPLIFIED PROBE TECHNIQUE, MAKING USE OF HIGH THROUGHPUT TECHNOLOGIES AS DESCRIBED BY CMS-2020-01-R: HCPCS

## 2021-05-19 PROCEDURE — U0005 INFEC AGEN DETEC AMPLI PROBE: HCPCS

## 2021-05-19 PROCEDURE — 99283 EMERGENCY DEPT VISIT LOW MDM: CPT

## 2021-05-19 RX ORDER — PENICILLIN V POTASSIUM 500 MG/1
500 TABLET ORAL 4 TIMES DAILY
Qty: 40 TABLET | Refills: 0 | Status: SHIPPED | OUTPATIENT
Start: 2021-05-19 | End: 2021-05-29

## 2021-05-19 RX ORDER — IBUPROFEN 600 MG/1
600 TABLET ORAL EVERY 6 HOURS PRN
Qty: 30 TABLET | Refills: 0 | Status: SHIPPED | OUTPATIENT
Start: 2021-05-19 | End: 2022-07-12

## 2021-05-19 ASSESSMENT — PAIN SCALES - GENERAL: PAINLEVEL_OUTOF10: 6

## 2021-05-19 ASSESSMENT — PAIN DESCRIPTION - DESCRIPTORS: DESCRIPTORS: ACHING

## 2021-05-19 NOTE — ED PROVIDER NOTES
FLUTICASONE (FLONASE) 50 MCG/ACT NASAL SPRAY    SPRAY 1 SPRAY INTO EACH NOSTRIL EVERY DAY    LORATADINE (CLARITIN PO)    Take 10 mg by mouth daily as needed (allergies)     NAPROXEN SODIUM (ALEVE) 220 MG TABLET    Take 2.5 tablets by mouth 2 times daily (with meals)    OMEGA-3 FATTY ACIDS (FISH OIL) 1000 MG CAPS    Take 2,000 mg by mouth daily        ALLERGIES     Lisinopril    FAMILY HISTORY           Problem Relation Age of Onset    Kidney Disease Mother     High Cholesterol Father     Allergy (Severe) Sister     No Known Problems Brother     Diabetes Maternal Aunt     Diabetes Maternal Uncle     No Known Problems Maternal Grandmother     Diabetes Maternal Grandfather     No Known Problems Paternal Grandmother     No Known Problems Paternal Grandfather      Family Status   Relation Name Status    Mother  Alive    Father  Alive    Sister  Alive    Brother  Alive   Elizabeth Hdz      MUnc      MGM      MGF      PGM      PGF          SOCIAL HISTORY      reports that he has been smoking cigarettes. He started smoking about 33 years ago. He has a 17.50 pack-year smoking history. He has never used smokeless tobacco. He reports current alcohol use. He reports that he does not use drugs. PHYSICAL EXAM    (up to 7 for level 4, 8 or more for level 5)     ED Triage Vitals [21 1849]   BP Temp Temp Source Pulse Resp SpO2 Height Weight   (!) 151/107 98.3 °F (36.8 °C) Oral 85 16 97 % 5' 8\" (1.727 m) 187 lb 6.3 oz (85 kg)       General: Alert white male no acute distress. Head: Atraumatic and normocephalic. Eyes: No conjunctival injection. No discharge. Pupils equal round reactive. ENT: TMs are normal.  Nose is clear. Oropharynx is moist.  Dentition is in poor repair. He has extensive dental caries. His left upper tooth #10 is decayed/broken off down to the gumline. There is some gingival erythema surrounding the tooth.   There is no obvious fluctuance of the buccal gingival fold. The remainder of his dentition is in otherwise poor repair. He has no swelling in the floor of mouth. No submandibular swelling. Neck: Supple, nontender, no adenopathy. Heart: Regular rate and rhythm. No murmurs or gallops noted. Lungs: Breath sounds equal bilaterally and clear. DIAGNOSTIC RESULTS     RADIOLOGY:   Non-plain film images such as CT, Ultrasound and MRI are read by the radiologist. Plain radiographic images are visualized and preliminarily interpreted by Nicole Prakash MD with the below findings:      Interpretation per the Radiologist below, if available at the time of this note:    No orders to display       LABS:  Labs Reviewed - No data to display    All other labs were within normal range or not returned as of this dictation. EMERGENCY DEPARTMENT COURSE and DIFFERENTIAL DIAGNOSIS/MDM:   Vitals:    Vitals:    05/19/21 1849   BP: (!) 151/107   Pulse: 85   Resp: 16   Temp: 98.3 °F (36.8 °C)   TempSrc: Oral   SpO2: 97%   Weight: 187 lb 6.3 oz (85 kg)   Height: 5' 8\" (1.727 m)       This patient has a toothache with dental caries. He has some surrounding gingivitis. I cannot rule out an early periapical abscess. He will be covered with antibiotics. Ibuprofen for pain. I instructed him to call his dentist tomorrow. He says he has a dentist to follow-up with. He was exposed to someone with Covid and is requesting a Covid test.  That was ordered. Diagnosis and treatment plan was discussed with the patient. He understands the treatment plan and follow-up as discussed. PROCEDURES:  None    FINAL IMPRESSION      1. Pain, dental    2.  Dental caries          DISPOSITION/PLAN   DISPOSITION Decision To Discharge 05/19/2021 07:00:16 PM      PATIENT REFERRED TO:  Dentist    In 2 days        DISCHARGE MEDICATIONS:  New Prescriptions    IBUPROFEN (ADVIL;MOTRIN) 600 MG TABLET    Take 1 tablet by mouth every 6 hours as needed for Pain    PENICILLIN V POTASSIUM (VEETID) 500 MG TABLET    Take 1 tablet by mouth 4 times daily for 10 days       (Please note that portions of this note were completed with a voice recognition program.  Efforts were made to edit the dictations but occasionally words are mis-transcribed.)    Shiva Garcia MD  Attending Emergency Physician        Magaly Ramon MD  05/19/21 9824

## 2021-05-19 NOTE — ED NOTES
Gave patient discharge instructions. He states, understanding.  Patient discharged to home     Violet Muir RN  05/19/21 3147

## 2021-05-20 ENCOUNTER — CARE COORDINATION (OUTPATIENT)
Dept: CARE COORDINATION | Age: 51
End: 2021-05-20

## 2021-05-20 LAB — SARS-COV-2: DETECTED

## 2021-05-20 NOTE — CARE COORDINATION
Phone call placed to patient for the purpose of ED FU and he was not available. LM and provided contact information. Plan  Make second phone call    Laurie Hernandez.  Zandra Dewitt RN, BSN, [address] [place] [address]  [phone]

## 2021-05-21 ENCOUNTER — CARE COORDINATION (OUTPATIENT)
Dept: CARE COORDINATION | Age: 51
End: 2021-05-21

## 2021-05-21 NOTE — CARE COORDINATION
Placed second phone call to reach patient for ED FU and he was not available. Plan  No further outreach is necessary    Brianna Pat.  Brianna Mosqueda RN, BSN, [address] [place] [address] Primary Care  [phone]

## 2021-06-01 DIAGNOSIS — F90.0 ATTENTION DEFICIT HYPERACTIVITY DISORDER (ADHD), PREDOMINANTLY INATTENTIVE TYPE: ICD-10-CM

## 2021-06-01 RX ORDER — DEXTROAMPHETAMINE SACCHARATE, AMPHETAMINE ASPARTATE, DEXTROAMPHETAMINE SULFATE AND AMPHETAMINE SULFATE 7.5; 7.5; 7.5; 7.5 MG/1; MG/1; MG/1; MG/1
30 TABLET ORAL DAILY
Qty: 30 TABLET | Refills: 0 | Status: SHIPPED | OUTPATIENT
Start: 2021-06-01 | End: 2021-06-24 | Stop reason: SDUPTHER

## 2021-06-01 NOTE — TELEPHONE ENCOUNTER
----- Message from Sushma Del Valle sent at 5/29/2021 10:23 AM EDT -----  Subject: Refill Request    QUESTIONS  Name of Medication? amphetamine-dextroamphetamine (ADDERALL, 30MG,) 30 MG   tablet  Patient-reported dosage and instructions? 1 tablet daily  How many days do you have left? 0  Preferred Pharmacy? [id number]  Pharmacy phone number (if available)? [phone]  Additional Information for Provider? Pt is out of medication and is   needing refill as soon as possible.  ---------------------------------------------------------------------------  --------------  CALL BACK INFO  What is the best way for the office to contact you? OK to leave message on   voicemail  Preferred Call Back Phone Number?  [phone]

## 2021-06-11 ENCOUNTER — OFFICE VISIT (OUTPATIENT)
Dept: FAMILY MEDICINE CLINIC | Age: 51
End: 2021-06-11
Payer: COMMERCIAL

## 2021-06-11 VITALS
SYSTOLIC BLOOD PRESSURE: 122 MMHG | DIASTOLIC BLOOD PRESSURE: 80 MMHG | OXYGEN SATURATION: 96 % | HEIGHT: 68 IN | BODY MASS INDEX: 30.04 KG/M2 | TEMPERATURE: 97 F | HEART RATE: 72 BPM | WEIGHT: 198.2 LBS

## 2021-06-11 DIAGNOSIS — J31.0 RHINOSINUSITIS: Primary | ICD-10-CM

## 2021-06-11 DIAGNOSIS — H66.002 ACUTE SUPPURATIVE OTITIS MEDIA OF LEFT EAR WITHOUT SPONTANEOUS RUPTURE OF TYMPANIC MEMBRANE, RECURRENCE NOT SPECIFIED: ICD-10-CM

## 2021-06-11 DIAGNOSIS — J32.9 RHINOSINUSITIS: Primary | ICD-10-CM

## 2021-06-11 PROCEDURE — 99212 OFFICE O/P EST SF 10 MIN: CPT | Performed by: NURSE PRACTITIONER

## 2021-06-11 PROCEDURE — 3017F COLORECTAL CA SCREEN DOC REV: CPT | Performed by: NURSE PRACTITIONER

## 2021-06-11 PROCEDURE — 4004F PT TOBACCO SCREEN RCVD TLK: CPT | Performed by: NURSE PRACTITIONER

## 2021-06-11 PROCEDURE — G8417 CALC BMI ABV UP PARAM F/U: HCPCS | Performed by: NURSE PRACTITIONER

## 2021-06-11 PROCEDURE — G8427 DOCREV CUR MEDS BY ELIG CLIN: HCPCS | Performed by: NURSE PRACTITIONER

## 2021-06-11 RX ORDER — AMOXICILLIN AND CLAVULANATE POTASSIUM 875; 125 MG/1; MG/1
1 TABLET, FILM COATED ORAL 2 TIMES DAILY
Qty: 14 TABLET | Refills: 0 | Status: SHIPPED | OUTPATIENT
Start: 2021-06-11 | End: 2021-06-18

## 2021-06-11 RX ORDER — GUAIFENESIN 600 MG/1
600 TABLET, EXTENDED RELEASE ORAL 2 TIMES DAILY
Qty: 30 TABLET | Refills: 0 | Status: SHIPPED | OUTPATIENT
Start: 2021-06-11 | End: 2021-06-26

## 2021-06-11 RX ORDER — LORATADINE 10 MG/1
10 TABLET ORAL DAILY
Qty: 30 TABLET | Refills: 0 | Status: SHIPPED | OUTPATIENT
Start: 2021-06-11 | End: 2021-06-24 | Stop reason: SDUPTHER

## 2021-06-11 RX ORDER — FLUTICASONE PROPIONATE 50 MCG
2 SPRAY, SUSPENSION (ML) NASAL DAILY
Qty: 1 BOTTLE | Refills: 0 | Status: SHIPPED | OUTPATIENT
Start: 2021-06-11 | End: 2021-07-06

## 2021-06-11 RX ORDER — AZITHROMYCIN 250 MG/1
250 TABLET, FILM COATED ORAL DAILY
COMMUNITY
End: 2022-02-01

## 2021-06-11 ASSESSMENT — ENCOUNTER SYMPTOMS
TROUBLE SWALLOWING: 0
SHORTNESS OF BREATH: 0
SORE THROAT: 1
WHEEZING: 0
COUGH: 0
NAUSEA: 0
APNEA: 0
ABDOMINAL PAIN: 0

## 2021-06-11 NOTE — PATIENT INSTRUCTIONS
Patient Education        Patient Education        Sinusitis: Care Instructions  Your Care Instructions     Sinusitis is an infection of the lining of the sinus cavities in your head. Sinusitis often follows a cold. It causes pain and pressure in your head and face. In most cases, sinusitis gets better on its own in 1 to 2 weeks. But some mild symptoms may last for several weeks. Sometimes antibiotics are needed. Follow-up care is a key part of your treatment and safety. Be sure to make and go to all appointments, and call your doctor if you are having problems. It's also a good idea to know your test results and keep a list of the medicines you take. How can you care for yourself at home? · Take an over-the-counter pain medicine, such as acetaminophen (Tylenol), ibuprofen (Advil, Motrin), or naproxen (Aleve). Read and follow all instructions on the label. · If the doctor prescribed antibiotics, take them as directed. Do not stop taking them just because you feel better. You need to take the full course of antibiotics. · Be careful when taking over-the-counter cold or flu medicines and Tylenol at the same time. Many of these medicines have acetaminophen, which is Tylenol. Read the labels to make sure that you are not taking more than the recommended dose. Too much acetaminophen (Tylenol) can be harmful. · Breathe warm, moist air from a steamy shower, a hot bath, or a sink filled with hot water. Avoid cold, dry air. Using a humidifier in your home may help. Follow the directions for cleaning the machine. · Use saline (saltwater) nasal washes. This can help keep your nasal passages open and wash out mucus and bacteria. You can buy saline nose drops at a grocery store or drugstore. Or you can make your own at home by adding 1 teaspoon of salt and 1 teaspoon of baking soda to 2 cups of distilled water.  If you make your own, fill a bulb syringe with the solution, insert the tip into your nostril, and squeeze gently. Tania Boozer your nose. · Put a hot, wet towel or a warm gel pack on your face 3 or 4 times a day for 5 to 10 minutes each time. · Try a decongestant nasal spray like oxymetazoline (Afrin). Do not use it for more than 3 days in a row. Using it for more than 3 days can make your congestion worse. When should you call for help? Call your doctor now or seek immediate medical care if:    · You have new or worse swelling or redness in your face or around your eyes.     · You have a new or higher fever. Watch closely for changes in your health, and be sure to contact your doctor if:    · You have new or worse facial pain.     · The mucus from your nose becomes thicker (like pus) or has new blood in it.     · You are not getting better as expected. Where can you learn more? Go to https://ClickN KIDSpeQuantasoneb.SalesWarp. org and sign in to your Storspeed account. Enter U023 in the NameMedia box to learn more about \"Sinusitis: Care Instructions. \"     If you do not have an account, please click on the \"Sign Up Now\" link. Current as of: December 2, 2020               Content Version: 12.8  © 2006-2021 [employer]. Care instructions adapted under license by [facility] ([facility]). If you have questions about a medical condition or this instruction, always ask your healthcare professional. [place] [address] any warranty or liability for your use of this information. Patient Education        Ear Infection (Otitis Media): Care Instructions  Overview     An ear infection may start with a cold and affect the middle ear (otitis media). It can hurt a lot. Most ear infections clear up on their own in a couple of days and do not need antibiotics. Also, antibiotics do not work against viruses, which may be the cause of your infection. Regular doses of pain relievers are the best way to reduce your fever and help you feel better. Follow-up care is a key part of your treatment and safety. Be sure to make and go to all appointments, and call your doctor if you are having problems. It's also a good idea to know your test results and keep a list of the medicines you take. How can you care for yourself at home? · Take pain medicines exactly as directed. ? If the doctor gave you a prescription medicine for pain, take it as prescribed. ? If you are not taking a prescription pain medicine, take an over-the-counter medicine, such as acetaminophen (Tylenol), ibuprofen (Advil, Motrin), or naproxen (Aleve). Read and follow all instructions on the label. ? Do not take two or more pain medicines at the same time unless the doctor told you to. Many pain medicines have acetaminophen, which is Tylenol. Too much acetaminophen (Tylenol) can be harmful. · Plan to take a full dose of pain reliever before bedtime. Getting enough sleep will help you get better. · Try a warm, moist washcloth on the ear. It may help relieve pain. · If your doctor prescribed antibiotics, take them as directed. Do not stop taking them just because you feel better. You need to take the full course of antibiotics. When should you call for help? Call your doctor now or seek immediate medical care if:    · You have new or increasing ear pain.     · You have new or increasing pus or blood draining from your ear.     · You have a fever with a stiff neck or a severe headache. Watch closely for changes in your health, and be sure to contact your doctor if:    · You have new or worse symptoms.     · You are not getting better after taking an antibiotic for 2 days. Where can you learn more? Go to https://EventBugriana.Pipit Interactive. org and sign in to your Genoom account. Enter Y733 in the [facility] box to learn more about \"Ear Infection (Otitis Media): Care Instructions. \"     If you do not have an account, please click on the \"Sign Up Now\" link.   Current as of: December 2, 2020               Content Version: 12.8  © 8834-7945 Healthwise, Incorporated. Care instructions adapted under license by [facility] ([facility]). If you have questions about a medical condition or this instruction, always ask your healthcare professional. [address] any warranty or liability for your use of this information.

## 2021-06-11 NOTE — PROGRESS NOTES
Bernardino Morse (:  1970) is a 44 y.o. male,Established patient, here for evaluation of the following chief complaint(s):  Sinus Problem (drainage, cough, yellow mucous, sore throat)         ASSESSMENT/PLAN:  1. Rhinosinusitis  Use medications as prescribed. Pt education given. 2. Acute suppurative otitis media of left ear without spontaneous rupture of tympanic membrane, recurrence not specified  Start antibiotics today and follow up with   - [facility]       No follow-ups on file. Subjective   SUBJECTIVE/OBJECTIVE:  HPI   Presents today for complaints of sinus congestion and pain along with sore throat and productive cough, yellow mucous since Monday. Reports taking aleve sinus D with moderate relief. Associated symptoms are left ear pain and drainage, states it was relieved when he was taking the PCN from the ED and the symptoms returned when he stopped taking it, he asked if he could see a ear doctor. Denies fever, nausea, vomitng or abd pain. Reports overall he is getting better but the chest congestion is bothering him. Current Outpatient Medications   Medication Sig Dispense Refill    guaiFENesin (MUCINEX) 600 MG extended release tablet Take 1 tablet by mouth 2 times daily for 15 days 30 tablet 0    fluticasone (FLONASE) 50 MCG/ACT nasal spray 2 sprays by Each Nostril route daily 1 Bottle 0    loratadine (CLARITIN) 10 MG tablet Take 1 tablet by mouth daily 30 tablet 0    amoxicillin-clavulanate (AUGMENTIN) 875-125 MG per tablet Take 1 tablet by mouth 2 times daily for 7 days 14 tablet 0    amphetamine-dextroamphetamine (ADDERALL, 30MG,) 30 MG tablet Take 1 tablet by mouth daily for 30 days.  30 tablet 0    ibuprofen (ADVIL;MOTRIN) 600 MG tablet Take 1 tablet by mouth every 6 hours as needed for Pain 30 tablet 0    naproxen sodium (ALEVE) 220 MG tablet Take 2.5 tablets by mouth 2 times daily (with meals) (Patient taking differently: Take 550 mg by mouth

## 2021-06-16 ENCOUNTER — OFFICE VISIT (OUTPATIENT)
Dept: ENT CLINIC | Age: 51
End: 2021-06-16
Payer: COMMERCIAL

## 2021-06-16 ENCOUNTER — TELEPHONE (OUTPATIENT)
Dept: ENT CLINIC | Age: 51
End: 2021-06-16

## 2021-06-16 VITALS — TEMPERATURE: 98.6 F | HEART RATE: 72 BPM | SYSTOLIC BLOOD PRESSURE: 146 MMHG | DIASTOLIC BLOOD PRESSURE: 81 MMHG

## 2021-06-16 DIAGNOSIS — H60.8X2 CHRONIC ECZEMATOUS OTITIS EXTERNA OF LEFT EAR: Primary | ICD-10-CM

## 2021-06-16 PROCEDURE — G8417 CALC BMI ABV UP PARAM F/U: HCPCS | Performed by: OTOLARYNGOLOGY

## 2021-06-16 PROCEDURE — 3017F COLORECTAL CA SCREEN DOC REV: CPT | Performed by: OTOLARYNGOLOGY

## 2021-06-16 PROCEDURE — 99203 OFFICE O/P NEW LOW 30 MIN: CPT | Performed by: OTOLARYNGOLOGY

## 2021-06-16 PROCEDURE — 4004F PT TOBACCO SCREEN RCVD TLK: CPT | Performed by: OTOLARYNGOLOGY

## 2021-06-16 PROCEDURE — G8427 DOCREV CUR MEDS BY ELIG CLIN: HCPCS | Performed by: OTOLARYNGOLOGY

## 2021-06-16 RX ORDER — METHYLPREDNISOLONE 4 MG/1
4 TABLET ORAL SEE ADMIN INSTRUCTIONS
Qty: 1 KIT | Refills: 0 | Status: SHIPPED | OUTPATIENT
Start: 2021-06-16 | End: 2022-02-01

## 2021-06-16 RX ORDER — CIPROFLOXACIN AND DEXAMETHASONE 3; 1 MG/ML; MG/ML
4 SUSPENSION/ DROPS AURICULAR (OTIC) 2 TIMES DAILY
Qty: 1 BOTTLE | Refills: 0 | Status: SHIPPED | OUTPATIENT
Start: 2021-06-16 | End: 2021-06-23

## 2021-06-16 ASSESSMENT — ENCOUNTER SYMPTOMS
ALLERGIC/IMMUNOLOGIC NEGATIVE: 1
EYES NEGATIVE: 1
VOICE CHANGE: 0
TROUBLE SWALLOWING: 0
SORE THROAT: 0
SINUS PAIN: 0
FACIAL SWELLING: 0
SINUS PRESSURE: 0
RESPIRATORY NEGATIVE: 1
RHINORRHEA: 1

## 2021-06-16 NOTE — PROGRESS NOTES
SUBJECTIVE:    Chief Complaint   Patient presents with    Ear Problem     lef tear pain        Kalyani Prado is a 44 y.o. male    Is noted for the past 2 to 3 days some drainage in his left ear. He has had some longstanding hearing loss in his right ear which he believes is secondary to noise induced from shooting. However, hearing loss seems to have been progressive. He has had itching on a chronic basis primarily in his left ear. Previously had tried some drops. He has had occasional tinnitus in both ears but no vertigo and no fever. He has had some chronic sinus problems for which now he is on Allegra along with amoxicillin and Flonase nasal spray. He does smoke 1 pack of cigarettes daily. Past Medical History:   Diagnosis Date    ADD (attention deficit disorder)     Depression     Hypertension       Past Surgical History:   Procedure Laterality Date    FRACTURE SURGERY      right wrist    WISDOM TOOTH EXTRACTION        Family History   Problem Relation Age of Onset    Kidney Disease Mother     High Cholesterol Father     Allergy (Severe) Sister     No Known Problems Brother     Diabetes Maternal Aunt     Diabetes Maternal Uncle     No Known Problems Maternal Grandmother     Diabetes Maternal Grandfather     No Known Problems Paternal Grandmother     No Known Problems Paternal Grandfather       Social History     Tobacco Use    Smoking status: Current Every Day Smoker     Packs/day: 0.50     Years: 35.00     Pack years: 17.50     Types: Cigarettes     Start date: 1/1/1988    Smokeless tobacco: Never Used   Substance Use Topics    Alcohol use: Yes     Comment: occassional        Review of Systems:  Review of Systems   Constitutional: Negative. Negative for fever. HENT: Positive for ear discharge, ear pain, hearing loss, postnasal drip, rhinorrhea and tinnitus.  Negative for congestion, dental problem, drooling, facial swelling, mouth sores, nosebleeds, sinus pressure, sinus pain, sneezing, sore throat, trouble swallowing and voice change. Eyes: Negative. Respiratory: Negative. Cardiovascular: Negative. Endocrine: Negative. Skin: Negative. Allergic/Immunologic: Negative. Neurological: Negative. Negative for dizziness and light-headedness. Hematological: Negative. Psychiatric/Behavioral: Negative. OBJECTIVE:  BP (!) 146/81   Pulse 72   Temp 98.6 °F (37 °C)   Physical Exam  Vitals and nursing note reviewed. Constitutional:       General: He is not in acute distress. Appearance: Normal appearance. He is normal weight. He is not ill-appearing, toxic-appearing or diaphoretic. HENT:      Head: Normocephalic and atraumatic. Right Ear: Tympanic membrane, ear canal and external ear normal. There is no impacted cerumen. Left Ear: Tympanic membrane and external ear normal. There is no impacted cerumen. Ears:      Comments: Chronic inflammation and some debris is noted in the left ear canal.  It is not particularly swollen. Findings are more similar to chronic external otitis of an eczematoid variety. Nose: Nose normal. No congestion or rhinorrhea. Mouth/Throat:      Mouth: Mucous membranes are moist.      Pharynx: Oropharynx is clear. No oropharyngeal exudate or posterior oropharyngeal erythema. Eyes:      Extraocular Movements: Extraocular movements intact. Conjunctiva/sclera: Conjunctivae normal.      Pupils: Pupils are equal, round, and reactive to light. Cardiovascular:      Rate and Rhythm: Normal rate and regular rhythm. Pulmonary:      Effort: Pulmonary effort is normal.   Musculoskeletal:      Cervical back: Normal range of motion and neck supple. No rigidity or tenderness. Lymphadenopathy:      Cervical: No cervical adenopathy. Skin:     General: Skin is warm and dry. Neurological:      General: No focal deficit present. Mental Status: He is alert and oriented to person, place, and time.  Mental status is at baseline. Psychiatric:         Mood and Affect: Mood normal.         Behavior: Behavior normal.         Thought Content: Thought content normal.         Judgment: Judgment normal.          ASSESSMENT:    Patient has evidence of chronic external otitis of an eczematoid variety primarily in the left ear. He probably also has some hearing loss which will require an audiogram as soon as the current problem is completed. PLAN:     We will start him on Ciprodex drops along with Medrol Dosepak. He will contact the office in 1 week as to his response.     Breana Sanchez MD

## 2021-06-18 NOTE — TELEPHONE ENCOUNTER
PA response stated to have pharmacy process the Rx as a CLAIRE 9. Called pharmacy and the claim went through. Called pt and LVM that his ear drops are now covered and are ready for him to .

## 2021-06-24 ENCOUNTER — OFFICE VISIT (OUTPATIENT)
Dept: FAMILY MEDICINE CLINIC | Age: 51
End: 2021-06-24
Payer: COMMERCIAL

## 2021-06-24 VITALS
SYSTOLIC BLOOD PRESSURE: 124 MMHG | HEART RATE: 84 BPM | TEMPERATURE: 98.1 F | WEIGHT: 199.4 LBS | BODY MASS INDEX: 30.22 KG/M2 | OXYGEN SATURATION: 96 % | DIASTOLIC BLOOD PRESSURE: 76 MMHG | HEIGHT: 68 IN

## 2021-06-24 DIAGNOSIS — F90.0 ATTENTION DEFICIT HYPERACTIVITY DISORDER (ADHD), PREDOMINANTLY INATTENTIVE TYPE: Primary | ICD-10-CM

## 2021-06-24 PROCEDURE — 99212 OFFICE O/P EST SF 10 MIN: CPT | Performed by: INTERNAL MEDICINE

## 2021-06-24 PROCEDURE — G8427 DOCREV CUR MEDS BY ELIG CLIN: HCPCS | Performed by: INTERNAL MEDICINE

## 2021-06-24 PROCEDURE — 3017F COLORECTAL CA SCREEN DOC REV: CPT | Performed by: INTERNAL MEDICINE

## 2021-06-24 PROCEDURE — G8417 CALC BMI ABV UP PARAM F/U: HCPCS | Performed by: INTERNAL MEDICINE

## 2021-06-24 PROCEDURE — 4004F PT TOBACCO SCREEN RCVD TLK: CPT | Performed by: INTERNAL MEDICINE

## 2021-06-24 RX ORDER — LORATADINE 10 MG/1
10 TABLET ORAL DAILY
Qty: 30 TABLET | Refills: 3 | Status: SHIPPED | OUTPATIENT
Start: 2021-06-24 | End: 2021-07-26

## 2021-06-24 RX ORDER — DEXTROAMPHETAMINE SACCHARATE, AMPHETAMINE ASPARTATE, DEXTROAMPHETAMINE SULFATE AND AMPHETAMINE SULFATE 7.5; 7.5; 7.5; 7.5 MG/1; MG/1; MG/1; MG/1
30 TABLET ORAL DAILY
Qty: 30 TABLET | Refills: 0 | Status: SHIPPED | OUTPATIENT
Start: 2021-06-29 | End: 2021-07-26 | Stop reason: SDUPTHER

## 2021-06-24 NOTE — PROGRESS NOTES
SUBJECTIVE:  Sergio Ennis is a 46 y.o. male being evaluated for:    Chief Complaint   Patient presents with    Medication Refill       HPI   Add controlled with adderall  No side effects      Allergies   Allergen Reactions    Lisinopril Other (See Comments)     Cough     Current Outpatient Medications   Medication Sig Dispense Refill    [START ON 6/29/2021] amphetamine-dextroamphetamine (ADDERALL, 30MG,) 30 MG tablet Take 1 tablet by mouth daily for 30 days. 30 tablet 0    loratadine (CLARITIN) 10 MG tablet Take 1 tablet by mouth daily 30 tablet 3    methylPREDNISolone (MEDROL, TRENTON,) 4 MG tablet Take 1 tablet by mouth See Admin Instructions Take by mouth. 1 kit 0    guaiFENesin (MUCINEX) 600 MG extended release tablet Take 1 tablet by mouth 2 times daily for 15 days 30 tablet 0    fluticasone (FLONASE) 50 MCG/ACT nasal spray 2 sprays by Each Nostril route daily 1 Bottle 0    azithromycin (ZITHROMAX Z-TRENTON) 250 MG tablet Take 250 mg by mouth daily      ibuprofen (ADVIL;MOTRIN) 600 MG tablet Take 1 tablet by mouth every 6 hours as needed for Pain 30 tablet 0    naproxen sodium (ALEVE) 220 MG tablet Take 2.5 tablets by mouth 2 times daily (with meals) (Patient taking differently: Take 550 mg by mouth as needed ) 40 tablet 0    Omega-3 Fatty Acids (FISH OIL) 1000 MG CAPS Take 2,000 mg by mouth daily       Loratadine (CLARITIN PO) Take 10 mg by mouth daily as needed (allergies)        No current facility-administered medications for this visit.          Social History     Socioeconomic History    Marital status:      Spouse name: Not on file    Number of children: Not on file    Years of education: Not on file    Highest education level: Not on file   Occupational History    Not on file   Tobacco Use    Smoking status: Current Every Day Smoker     Packs/day: 0.50     Years: 35.00     Pack years: 17.50     Types: Cigarettes     Start date: 1/1/1988    Smokeless tobacco: Never Used   Vaping Use and headaches. Psychiatric/Behavioral: Positive for decreased concentration (doing well with medication ). Negative for agitation, behavioral problems, dysphoric mood, hallucinations, sleep disturbance and suicidal ideas. The patient is not nervous/anxious and is not hyperactive. OBJECTIVE:  /76 (Site: Right Upper Arm, Position: Sitting, Cuff Size: Medium Adult)   Pulse 84   Temp 98.1 °F (36.7 °C) (Temporal)   Ht 5' 8\" (1.727 m)   Wt 199 lb 6.4 oz (90.4 kg)   SpO2 96%   BMI 30.32 kg/m²      Body mass index is 30.32 kg/m². Physical Exam  Vitals and nursing note reviewed. Constitutional:       General: He is not in acute distress. Appearance: Normal appearance. He is well-developed. HENT:      Head: Normocephalic and atraumatic. Comments: Ear canals red worse on the right   Eyes:      Conjunctiva/sclera: Conjunctivae normal.   Neck:      Thyroid: No thyromegaly. Cardiovascular:      Rate and Rhythm: Normal rate and regular rhythm. Heart sounds: Normal heart sounds. No murmur heard. No friction rub. No gallop. Pulmonary:      Effort: Pulmonary effort is normal.      Breath sounds: Normal breath sounds. Chest:      Chest wall: No tenderness. Abdominal:      General: There is no distension. Palpations: Abdomen is soft. Tenderness: There is no abdominal tenderness. Comments: No HSM   Musculoskeletal:         General: No swelling. Cervical back: Neck supple. Lymphadenopathy:      Cervical: No cervical adenopathy. Skin:     General: Skin is warm and dry. Neurological:      General: No focal deficit present. Mental Status: He is alert. Gait: Gait normal.   Psychiatric:         Mood and Affect: Mood normal.         Behavior: Behavior normal.         Thought Content: Thought content normal.         ASSESSMENT/PLAN:    Kwesi Moreno was seen today for medication refill.     Diagnoses and all orders for this visit:    Attention deficit hyperactivity disorder (ADHD), predominantly inattentive type oarrs was reviewed   -     amphetamine-dextroamphetamine (ADDERALL, 30MG,) 30 MG tablet; Take 1 tablet by mouth daily for 30 days. Environmental allergies   -     loratadine (CLARITIN) 10 MG tablet; Take 1 tablet by mouth daily        Orders Placed This Encounter   Medications    amphetamine-dextroamphetamine (ADDERALL, 30MG,) 30 MG tablet     Sig: Take 1 tablet by mouth daily for 30 days. Dispense:  30 tablet     Refill:  0    loratadine (CLARITIN) 10 MG tablet     Sig: Take 1 tablet by mouth daily     Dispense:  30 tablet     Refill:  3        Return in about 3 months (around 9/24/2021), or if symptoms worsen or fail to improve. There are no Patient Instructions on file for this visit.

## 2021-06-27 ASSESSMENT — ENCOUNTER SYMPTOMS
COUGH: 0
NAUSEA: 0
DIARRHEA: 0
ABDOMINAL PAIN: 0
VOMITING: 0
SHORTNESS OF BREATH: 0

## 2021-07-26 ENCOUNTER — TELEPHONE (OUTPATIENT)
Dept: FAMILY MEDICINE CLINIC | Age: 51
End: 2021-07-26

## 2021-07-26 DIAGNOSIS — F90.0 ATTENTION DEFICIT HYPERACTIVITY DISORDER (ADHD), PREDOMINANTLY INATTENTIVE TYPE: ICD-10-CM

## 2021-07-26 RX ORDER — LORATADINE 10 MG/1
10 TABLET ORAL DAILY
Qty: 90 TABLET | Refills: 1 | Status: SHIPPED | OUTPATIENT
Start: 2021-07-26 | End: 2022-07-06 | Stop reason: SDUPTHER

## 2021-07-26 RX ORDER — DEXTROAMPHETAMINE SACCHARATE, AMPHETAMINE ASPARTATE, DEXTROAMPHETAMINE SULFATE AND AMPHETAMINE SULFATE 7.5; 7.5; 7.5; 7.5 MG/1; MG/1; MG/1; MG/1
30 TABLET ORAL DAILY
Qty: 30 TABLET | Refills: 0 | Status: SHIPPED | OUTPATIENT
Start: 2021-07-28 | End: 2021-08-19 | Stop reason: SDUPTHER

## 2021-07-26 NOTE — TELEPHONE ENCOUNTER
Patient needs refill on Adderall 30 mg sent to [facility]. Also patient is ashing for his loratadine 10 mg 90 day supply to be sent to [facility].       LOV  6/24/21  NOV  None

## 2021-08-02 RX ORDER — FLUTICASONE PROPIONATE 50 MCG
SPRAY, SUSPENSION (ML) NASAL
Qty: 1 BOTTLE | Refills: 1 | Status: SHIPPED | OUTPATIENT
Start: 2021-08-02 | End: 2021-08-30

## 2021-08-19 ENCOUNTER — TELEPHONE (OUTPATIENT)
Dept: FAMILY MEDICINE CLINIC | Age: 51
End: 2021-08-19

## 2021-08-19 DIAGNOSIS — F90.0 ATTENTION DEFICIT HYPERACTIVITY DISORDER (ADHD), PREDOMINANTLY INATTENTIVE TYPE: ICD-10-CM

## 2021-08-19 RX ORDER — DEXTROAMPHETAMINE SACCHARATE, AMPHETAMINE ASPARTATE, DEXTROAMPHETAMINE SULFATE AND AMPHETAMINE SULFATE 7.5; 7.5; 7.5; 7.5 MG/1; MG/1; MG/1; MG/1
30 TABLET ORAL DAILY
Qty: 30 TABLET | Refills: 0 | Status: SHIPPED | OUTPATIENT
Start: 2021-08-26 | End: 2021-09-23 | Stop reason: SDUPTHER

## 2021-08-19 NOTE — TELEPHONE ENCOUNTER
Patient needs refill on Adderall 30 mg sent to [facility].     LOV 6/24/21  NOV  None

## 2021-08-30 RX ORDER — FLUTICASONE PROPIONATE 50 MCG
SPRAY, SUSPENSION (ML) NASAL
Qty: 1 BOTTLE | Refills: 1 | Status: SHIPPED | OUTPATIENT
Start: 2021-08-30 | End: 2021-10-04

## 2021-09-23 ENCOUNTER — TELEPHONE (OUTPATIENT)
Dept: FAMILY MEDICINE CLINIC | Age: 51
End: 2021-09-23

## 2021-09-23 DIAGNOSIS — F90.0 ATTENTION DEFICIT HYPERACTIVITY DISORDER (ADHD), PREDOMINANTLY INATTENTIVE TYPE: ICD-10-CM

## 2021-09-23 RX ORDER — DEXTROAMPHETAMINE SACCHARATE, AMPHETAMINE ASPARTATE, DEXTROAMPHETAMINE SULFATE AND AMPHETAMINE SULFATE 7.5; 7.5; 7.5; 7.5 MG/1; MG/1; MG/1; MG/1
30 TABLET ORAL DAILY
Qty: 30 TABLET | Refills: 0 | Status: SHIPPED | OUTPATIENT
Start: 2021-09-23 | End: 2021-10-21 | Stop reason: SDUPTHER

## 2021-09-23 NOTE — TELEPHONE ENCOUNTER
----- Message from Jonas Taylor sent at 9/23/2021  7:43 AM EDT -----  Subject: Refill Request    QUESTIONS  Name of Medication? amphetamine-dextroamphetamine (ADDERALL, 30MG,) 30 MG   tablet  Patient-reported dosage and instructions? 1 tablet once a day  How many days do you have left? 3  Preferred Pharmacy? [id number]  Pharmacy phone number (if available)? [phone]  ---------------------------------------------------------------------------  --------------  Zandra BELCHER  What is the best way for the office to contact you? OK to leave message on   voicemail  Preferred Call Back Phone Number?  [phone]

## 2021-10-04 RX ORDER — FLUTICASONE PROPIONATE 50 MCG
SPRAY, SUSPENSION (ML) NASAL
Qty: 16 G | Refills: 1 | Status: SHIPPED | OUTPATIENT
Start: 2021-10-04 | End: 2021-11-01

## 2021-10-21 ENCOUNTER — OFFICE VISIT (OUTPATIENT)
Dept: FAMILY MEDICINE CLINIC | Age: 51
End: 2021-10-21
Payer: COMMERCIAL

## 2021-10-21 VITALS
TEMPERATURE: 98.7 F | SYSTOLIC BLOOD PRESSURE: 126 MMHG | BODY MASS INDEX: 30.16 KG/M2 | HEART RATE: 91 BPM | DIASTOLIC BLOOD PRESSURE: 74 MMHG | OXYGEN SATURATION: 98 % | HEIGHT: 68 IN | WEIGHT: 199 LBS

## 2021-10-21 DIAGNOSIS — F90.0 ATTENTION DEFICIT HYPERACTIVITY DISORDER (ADHD), PREDOMINANTLY INATTENTIVE TYPE: ICD-10-CM

## 2021-10-21 PROCEDURE — 99212 OFFICE O/P EST SF 10 MIN: CPT | Performed by: INTERNAL MEDICINE

## 2021-10-21 PROCEDURE — G8427 DOCREV CUR MEDS BY ELIG CLIN: HCPCS | Performed by: INTERNAL MEDICINE

## 2021-10-21 PROCEDURE — 3017F COLORECTAL CA SCREEN DOC REV: CPT | Performed by: INTERNAL MEDICINE

## 2021-10-21 PROCEDURE — G8417 CALC BMI ABV UP PARAM F/U: HCPCS | Performed by: INTERNAL MEDICINE

## 2021-10-21 PROCEDURE — 4004F PT TOBACCO SCREEN RCVD TLK: CPT | Performed by: INTERNAL MEDICINE

## 2021-10-21 PROCEDURE — G8484 FLU IMMUNIZE NO ADMIN: HCPCS | Performed by: INTERNAL MEDICINE

## 2021-10-21 RX ORDER — DEXTROAMPHETAMINE SACCHARATE, AMPHETAMINE ASPARTATE, DEXTROAMPHETAMINE SULFATE AND AMPHETAMINE SULFATE 7.5; 7.5; 7.5; 7.5 MG/1; MG/1; MG/1; MG/1
30 TABLET ORAL DAILY
Qty: 30 TABLET | Refills: 0 | Status: SHIPPED | OUTPATIENT
Start: 2021-10-21 | End: 2021-11-17 | Stop reason: SDUPTHER

## 2021-10-21 NOTE — PROGRESS NOTES
SUBJECTIVE:  Rachna Wade is a 46 y.o. male being evaluated for:    Chief Complaint   Patient presents with    3 Month Follow-Up      Patient is here for 3 months followup. HPI   ADD is doing well well  NO hyperactivity  With meds things are going wll     Allergies   Allergen Reactions    Lisinopril Other (See Comments)     Cough     Current Outpatient Medications   Medication Sig Dispense Refill    amphetamine-dextroamphetamine (ADDERALL, 30MG,) 30 MG tablet Take 1 tablet by mouth daily for 30 days. 30 tablet 0    fluticasone (FLONASE) 50 MCG/ACT nasal spray SPRAY 2 SPRAYS INTO EACH NOSTRIL EVERY DAY 16 g 1    loratadine (CLARITIN) 10 MG tablet Take 1 tablet by mouth daily 90 tablet 1    methylPREDNISolone (MEDROL, TRENTON,) 4 MG tablet Take 1 tablet by mouth See Admin Instructions Take by mouth. 1 kit 0    azithromycin (ZITHROMAX Z-TRENTON) 250 MG tablet Take 250 mg by mouth daily      ibuprofen (ADVIL;MOTRIN) 600 MG tablet Take 1 tablet by mouth every 6 hours as needed for Pain 30 tablet 0    naproxen sodium (ALEVE) 220 MG tablet Take 2.5 tablets by mouth 2 times daily (with meals) (Patient taking differently: Take 550 mg by mouth as needed ) 40 tablet 0    Omega-3 Fatty Acids (FISH OIL) 1000 MG CAPS Take 2,000 mg by mouth daily        No current facility-administered medications for this visit.          Social History     Socioeconomic History    Marital status:      Spouse name: Not on file    Number of children: Not on file    Years of education: Not on file    Highest education level: Not on file   Occupational History    Not on file   Tobacco Use    Smoking status: Current Every Day Smoker     Packs/day: 0.50     Years: 35.00     Pack years: 17.50     Types: Cigarettes     Start date: 1/1/1988    Smokeless tobacco: Never Used   Vaping Use    Vaping Use: Never used   Substance and Sexual Activity    Alcohol use: Yes     Comment: occassional    Drug use: Never    Sexual activity: Yes     Partners: Female   Other Topics Concern    Not on file   Social History Narrative    Not on file     Social Determinants of Health     Financial Resource Strain: Unknown    Difficulty of Paying Living Expenses: Patient refused   Food Insecurity: Unknown    Worried About Running Out of Food in the Last Year: Patient refused   [address] in the Last Year: Patient refused   Transportation Needs: Unknown    Lack of Transportation (Medical): Patient refused    Lack of Transportation (Non-Medical): Patient refused   Physical Activity:     Days of Exercise per Week:     Minutes of Exercise per Session:    Stress:     Feeling of Stress :    Social Connections:     Frequency of Communication with Friends and Family:     Frequency of Social Gatherings with Friends and Family:     Attends Mosque Services:     Active Member of Clubs or Organizations:     Attends Club or Organization Meetings:     Marital Status:    Intimate Partner Violence:     Fear of Current or Ex-Partner:     Emotionally Abused:     Physically Abused:     Sexually Abused:       Past Medical History:   Diagnosis Date    ADD (attention deficit disorder)     Depression     Hypertension      Past Surgical History:   Procedure Laterality Date    FRACTURE SURGERY      right wrist    WISDOM TOOTH EXTRACTION         Review of Systems   Constitutional: Negative for activity change, fatigue and unexpected weight change ( ). HENT: Negative for nosebleeds. Respiratory: Negative for cough and shortness of breath. Cardiovascular: Negative for chest pain, palpitations and leg swelling. Gastrointestinal: Negative for abdominal pain, diarrhea, nausea and vomiting. Genitourinary: Negative for difficulty urinating. Neurological: Negative for dizziness, syncope, light-headedness and headaches. Psychiatric/Behavioral: Positive for decreased concentration (doing well with medication ).  Negative for agitation, behavioral problems, dysphoric mood, hallucinations, sleep disturbance and suicidal ideas. The patient is not nervous/anxious and is not hyperactive. OBJECTIVE:  /74 (Site: Left Upper Arm, Position: Sitting, Cuff Size: Medium Adult)   Pulse 91   Temp 98.7 °F (37.1 °C) (Oral)   Ht 5' 8\" (1.727 m)   Wt 199 lb (90.3 kg)   SpO2 98%   BMI 30.26 kg/m²      Body mass index is 30.26 kg/m². Physical Exam  Vitals and nursing note reviewed. Constitutional:       General: He is not in acute distress. Appearance: Normal appearance. He is well-developed. HENT:      Head: Normocephalic and atraumatic. Eyes:      Conjunctiva/sclera: Conjunctivae normal.   Neck:      Thyroid: No thyromegaly. Cardiovascular:      Rate and Rhythm: Normal rate and regular rhythm. Heart sounds: Normal heart sounds. No murmur heard. No friction rub. No gallop. Pulmonary:      Effort: Pulmonary effort is normal.      Breath sounds: Normal breath sounds. Chest:      Chest wall: No tenderness. Abdominal:      General: There is no distension. Palpations: Abdomen is soft. Tenderness: There is no abdominal tenderness. Comments: No HSM   Musculoskeletal:         General: No swelling. Cervical back: Neck supple. Lymphadenopathy:      Cervical: No cervical adenopathy. Skin:     General: Skin is warm and dry. Neurological:      General: No focal deficit present. Mental Status: He is alert. Gait: Gait normal.   Psychiatric:         Mood and Affect: Mood normal.         Behavior: Behavior normal.         Thought Content: Thought content normal.         ASSESSMENT/PLAN:    Indira Watson was seen today for 3 month follow-up. Diagnoses and all orders for this visit:    Attention deficit hyperactivity disorder (ADHD), predominantly inattentive type revewed medicare wellness   -     amphetamine-dextroamphetamine (ADDERALL, 30MG,) 30 MG tablet; Take 1 tablet by mouth daily for 30 days.         Orders Placed This Encounter   Medications    amphetamine-dextroamphetamine (ADDERALL, 30MG,) 30 MG tablet     Sig: Take 1 tablet by mouth daily for 30 days. Dispense:  30 tablet     Refill:  0        Return in about 3 months (around 1/21/2022), or if symptoms worsen or fail to improve. There are no Patient Instructions on file for this visit.

## 2021-11-01 RX ORDER — FLUTICASONE PROPIONATE 50 MCG
SPRAY, SUSPENSION (ML) NASAL
Qty: 16 G | Refills: 1 | Status: SHIPPED | OUTPATIENT
Start: 2021-11-01 | End: 2021-12-06

## 2021-11-01 ASSESSMENT — ENCOUNTER SYMPTOMS
SHORTNESS OF BREATH: 0
COUGH: 0
DIARRHEA: 0
NAUSEA: 0
ABDOMINAL PAIN: 0
VOMITING: 0

## 2021-11-17 ENCOUNTER — TELEPHONE (OUTPATIENT)
Dept: FAMILY MEDICINE CLINIC | Age: 51
End: 2021-11-17

## 2021-11-17 DIAGNOSIS — F90.0 ATTENTION DEFICIT HYPERACTIVITY DISORDER (ADHD), PREDOMINANTLY INATTENTIVE TYPE: ICD-10-CM

## 2021-11-17 RX ORDER — DEXTROAMPHETAMINE SACCHARATE, AMPHETAMINE ASPARTATE, DEXTROAMPHETAMINE SULFATE AND AMPHETAMINE SULFATE 7.5; 7.5; 7.5; 7.5 MG/1; MG/1; MG/1; MG/1
30 TABLET ORAL DAILY
Qty: 30 TABLET | Refills: 0 | Status: SHIPPED | OUTPATIENT
Start: 2021-11-19 | End: 2021-12-13 | Stop reason: SDUPTHER

## 2021-11-17 NOTE — TELEPHONE ENCOUNTER
----- Message from Priteshsushant Gabby sent at 11/17/2021  5:14 PM EST -----  Subject: Refill Request    QUESTIONS  Name of Medication? amphetamine-dextroamphetamine (ADDERALL, 30MG,) 30 MG   tablet  Patient-reported dosage and instructions? 1 DAILY   How many days do you have left? 3  Preferred Pharmacy? [id number]  Pharmacy phone number (if available)? [phone]  Additional Information for Provider? NEEDS TO BE SENT   ---------------------------------------------------------------------------  --------------  CALL BACK INFO  What is the best way for the office to contact you? OK to leave message on   voicemail  Preferred Call Back Phone Number?  [phone]

## 2021-12-06 RX ORDER — FLUTICASONE PROPIONATE 50 MCG
SPRAY, SUSPENSION (ML) NASAL
Qty: 16 G | Refills: 1 | Status: SHIPPED | OUTPATIENT
Start: 2021-12-06 | End: 2022-01-06

## 2021-12-13 DIAGNOSIS — F90.0 ATTENTION DEFICIT HYPERACTIVITY DISORDER (ADHD), PREDOMINANTLY INATTENTIVE TYPE: ICD-10-CM

## 2021-12-13 RX ORDER — DEXTROAMPHETAMINE SACCHARATE, AMPHETAMINE ASPARTATE, DEXTROAMPHETAMINE SULFATE AND AMPHETAMINE SULFATE 7.5; 7.5; 7.5; 7.5 MG/1; MG/1; MG/1; MG/1
30 TABLET ORAL DAILY
Qty: 30 TABLET | Refills: 0 | Status: SHIPPED | OUTPATIENT
Start: 2021-12-17 | End: 2022-01-17 | Stop reason: SDUPTHER

## 2021-12-13 NOTE — TELEPHONE ENCOUNTER
----- Message from Shital Arana sent at 12/13/2021  1:43 PM EST -----  Subject: Refill Request    QUESTIONS  Name of Medication? amphetamine-dextroamphetamine (ADDERALL, 30MG,) 30 MG   tablet  Patient-reported dosage and instructions? 30 MG; Once a day  How many days do you have left? 6  [id number]  Pharmacy phone number (if available)? [phone]  Additional Information for Provider? 30 day supply  ---------------------------------------------------------------------------  --------------  CALL BACK INFO  What is the best way for the office to contact you? OK to leave message on   voicemail  Preferred Call Back Phone Number?  [phone]

## 2022-01-06 RX ORDER — FLUTICASONE PROPIONATE 50 MCG
SPRAY, SUSPENSION (ML) NASAL
Qty: 1 EACH | Refills: 1 | Status: SHIPPED | OUTPATIENT
Start: 2022-01-06 | End: 2022-01-31

## 2022-01-13 DIAGNOSIS — F90.0 ATTENTION DEFICIT HYPERACTIVITY DISORDER (ADHD), PREDOMINANTLY INATTENTIVE TYPE: ICD-10-CM

## 2022-01-13 NOTE — TELEPHONE ENCOUNTER
Pt is needing a refill on     amphetamine-dextroamphetamine (ADDERALL, 30MG,) 30 MG tablet     [facility]/pharmacy #[id number] Hodges, [facility] -  [phone]

## 2022-01-17 RX ORDER — DEXTROAMPHETAMINE SACCHARATE, AMPHETAMINE ASPARTATE, DEXTROAMPHETAMINE SULFATE AND AMPHETAMINE SULFATE 7.5; 7.5; 7.5; 7.5 MG/1; MG/1; MG/1; MG/1
30 TABLET ORAL DAILY
Qty: 30 TABLET | Refills: 0 | Status: SHIPPED | OUTPATIENT
Start: 2022-01-17 | End: 2022-02-08 | Stop reason: SDUPTHER

## 2022-01-31 RX ORDER — FLUTICASONE PROPIONATE 50 MCG
SPRAY, SUSPENSION (ML) NASAL
Qty: 1 EACH | Refills: 1 | Status: SHIPPED | OUTPATIENT
Start: 2022-01-31 | End: 2022-04-18

## 2022-02-01 ENCOUNTER — OFFICE VISIT (OUTPATIENT)
Dept: FAMILY MEDICINE CLINIC | Age: 52
End: 2022-02-01
Payer: COMMERCIAL

## 2022-02-01 VITALS
SYSTOLIC BLOOD PRESSURE: 128 MMHG | HEIGHT: 68 IN | OXYGEN SATURATION: 98 % | BODY MASS INDEX: 30.31 KG/M2 | TEMPERATURE: 98.8 F | DIASTOLIC BLOOD PRESSURE: 71 MMHG | WEIGHT: 200 LBS | HEART RATE: 69 BPM

## 2022-02-01 DIAGNOSIS — F90.0 ATTENTION DEFICIT HYPERACTIVITY DISORDER (ADHD), PREDOMINANTLY INATTENTIVE TYPE: Primary | ICD-10-CM

## 2022-02-01 PROCEDURE — G8484 FLU IMMUNIZE NO ADMIN: HCPCS | Performed by: INTERNAL MEDICINE

## 2022-02-01 PROCEDURE — 3017F COLORECTAL CA SCREEN DOC REV: CPT | Performed by: INTERNAL MEDICINE

## 2022-02-01 PROCEDURE — G8427 DOCREV CUR MEDS BY ELIG CLIN: HCPCS | Performed by: INTERNAL MEDICINE

## 2022-02-01 PROCEDURE — G8417 CALC BMI ABV UP PARAM F/U: HCPCS | Performed by: INTERNAL MEDICINE

## 2022-02-01 PROCEDURE — 4004F PT TOBACCO SCREEN RCVD TLK: CPT | Performed by: INTERNAL MEDICINE

## 2022-02-01 PROCEDURE — 99212 OFFICE O/P EST SF 10 MIN: CPT | Performed by: INTERNAL MEDICINE

## 2022-02-01 NOTE — PROGRESS NOTES
SUBJECTIVE:  Theo Lockwood is a 46 y.o. male being evaluated for:    Chief Complaint   Patient presents with    3 Month Follow-Up      Patient is here for 3 months  followup on the medication refill. HPI   ADD is doing well  Concentration is fine not falling asleep while doing things      Allergies   Allergen Reactions    Lisinopril Other (See Comments)     Cough     Current Outpatient Medications   Medication Sig Dispense Refill    fluticasone (FLONASE) 50 MCG/ACT nasal spray SPRAY 2 SPRAYS INTO EACH NOSTRIL EVERY DAY 1 each 1    amphetamine-dextroamphetamine (ADDERALL, 30MG,) 30 MG tablet Take 1 tablet by mouth daily for 30 days. 30 tablet 0    loratadine (CLARITIN) 10 MG tablet Take 1 tablet by mouth daily 90 tablet 1    ibuprofen (ADVIL;MOTRIN) 600 MG tablet Take 1 tablet by mouth every 6 hours as needed for Pain 30 tablet 0    naproxen sodium (ALEVE) 220 MG tablet Take 2.5 tablets by mouth 2 times daily (with meals) (Patient taking differently: Take 550 mg by mouth as needed ) 40 tablet 0    Omega-3 Fatty Acids (FISH OIL) 1000 MG CAPS Take 2,000 mg by mouth daily        No current facility-administered medications for this visit.          Social History     Socioeconomic History    Marital status:      Spouse name: Not on file    Number of children: Not on file    Years of education: Not on file    Highest education level: Not on file   Occupational History    Not on file   Tobacco Use    Smoking status: Current Every Day Smoker     Packs/day: 0.50     Years: 35.00     Pack years: 17.50     Types: Cigarettes     Start date: 1/1/1988    Smokeless tobacco: Never Used   Vaping Use    Vaping Use: Never used   Substance and Sexual Activity    Alcohol use: Yes     Comment: occassional    Drug use: Never    Sexual activity: Yes     Partners: Female   Other Topics Concern    Not on file   Social History Narrative    Not on file     Social Determinants of Health     Financial Resource Strain: Unknown    Difficulty of Paying Living Expenses: Patient refused   Food Insecurity: Unknown    Worried About Running Out of Food in the Last Year: Patient refused   [address] in the Last Year: Patient refused   Transportation Needs: Unknown    Lack of Transportation (Medical): Patient refused    Lack of Transportation (Non-Medical): Patient refused   Physical Activity:     Days of Exercise per Week: Not on file   [employer] of Exercise per Session: Not on file   Stress:     Feeling of Stress : Not on file   Social Connections:     Frequency of Communication with Friends and Family: Not on file    Frequency of Social Gatherings with Friends and Family: Not on file    Attends Congregation Services: Not on file    Active Member of [address] [place] [address] [employer] or Organizations: Not on file    Attends Club or Organization Meetings: Not on file    Marital Status: Not on file   Intimate Partner Violence:     Fear of Current or Ex-Partner: Not on file    Emotionally Abused: Not on file    Physically Abused: Not on file    Sexually Abused: Not on file   Housing Stability:     Unable to Pay for Housing in the Last Year: Not on file    Number of [place] in the Last Year: Not on file    Unstable Housing in the Last Year: Not on file      Past Medical History:   Diagnosis Date    ADD (attention deficit disorder)     Depression     Hypertension      Past Surgical History:   Procedure Laterality Date    FRACTURE SURGERY      right wrist    WISDOM TOOTH EXTRACTION         Review of Systems   Constitutional: Negative for activity change, fatigue and unexpected weight change. HENT: Negative for nosebleeds. Respiratory: Negative for cough and shortness of breath. Cardiovascular: Negative for chest pain, palpitations and leg swelling. Gastrointestinal: Negative for abdominal pain, diarrhea, nausea and vomiting. Neurological: Negative for dizziness, light-headedness and headaches.    Psychiatric/Behavioral: Positive for decreased concentration (doing well with medication ). Negative for agitation, behavioral problems, dysphoric mood, hallucinations, sleep disturbance and suicidal ideas. The patient is not nervous/anxious and is not hyperactive. OBJECTIVE:  /71 (Site: Right Upper Arm, Position: Sitting, Cuff Size: Medium Adult)   Pulse 69   Temp 98.8 °F (37.1 °C) (Oral)   Ht 5' 8\" (1.727 m)   Wt 200 lb (90.7 kg)   SpO2 98%   BMI 30.41 kg/m²      Body mass index is 30.41 kg/m². Physical Exam  Vitals and nursing note reviewed. Constitutional:       General: He is not in acute distress. Appearance: Normal appearance. He is well-developed. HENT:      Head: Normocephalic and atraumatic. Eyes:      Conjunctiva/sclera: Conjunctivae normal.   Neck:      Thyroid: No thyromegaly. Cardiovascular:      Rate and Rhythm: Normal rate and regular rhythm. Heart sounds: Normal heart sounds. No murmur heard. No friction rub. No gallop. Pulmonary:      Effort: Pulmonary effort is normal.      Breath sounds: Normal breath sounds. Chest:      Chest wall: No tenderness. Abdominal:      General: There is no distension. Palpations: Abdomen is soft. Tenderness: There is no abdominal tenderness. Comments: No HSM   Musculoskeletal:         General: No swelling. Cervical back: Neck supple. Lymphadenopathy:      Cervical: No cervical adenopathy. Skin:     General: Skin is warm and dry. Neurological:      General: No focal deficit present. Mental Status: He is alert. Gait: Gait normal.   Psychiatric:         Mood and Affect: Mood normal.         Behavior: Behavior normal.         Thought Content: Thought content normal.         ASSESSMENT/PLAN:    Malini Hawkins was seen today for 3 month follow-up.     Diagnoses and all orders for this visit:    Attention deficit hyperactivity disorder (ADHD), predominantly inattentive type  oarrs was reviewed and fill when medication is due No orders of the defined types were placed in this encounter. Return in about 3 months (around 5/1/2022), or if symptoms worsen or fail to improve, for medication . There are no Patient Instructions on file for this visit.

## 2022-02-06 ASSESSMENT — ENCOUNTER SYMPTOMS
NAUSEA: 0
DIARRHEA: 0
ABDOMINAL PAIN: 0
COUGH: 0
SHORTNESS OF BREATH: 0
VOMITING: 0

## 2022-02-08 DIAGNOSIS — F90.0 ATTENTION DEFICIT HYPERACTIVITY DISORDER (ADHD), PREDOMINANTLY INATTENTIVE TYPE: ICD-10-CM

## 2022-02-08 RX ORDER — DEXTROAMPHETAMINE SACCHARATE, AMPHETAMINE ASPARTATE, DEXTROAMPHETAMINE SULFATE AND AMPHETAMINE SULFATE 7.5; 7.5; 7.5; 7.5 MG/1; MG/1; MG/1; MG/1
30 TABLET ORAL DAILY
Qty: 30 TABLET | Refills: 0 | Status: SHIPPED | OUTPATIENT
Start: 2022-02-14 | End: 2022-03-11 | Stop reason: SDUPTHER

## 2022-02-08 NOTE — TELEPHONE ENCOUNTER
Pt is needing a refill on   amphetamine-dextroamphetamine (ADDERALL, 30MG,) 30 MG tablet     [facility]/pharmacy [id number], [place] - [facility] -  [phone]     LOV   2.1.2022  NOV

## 2022-02-22 ENCOUNTER — TELEPHONE (OUTPATIENT)
Dept: FAMILY MEDICINE CLINIC | Age: 52
End: 2022-02-22

## 2022-02-22 NOTE — TELEPHONE ENCOUNTER
----- Message from Lata Stakes sent at 2/22/2022 11:43 AM EST -----  Subject: Message to Provider    QUESTIONS  Information for Provider? pt called in wanting to know if Dr needs to see   him for an appointment to be tested for STDs or can he just come in and   see the nurse and be tested? Please call pt to advise on this issue.  ---------------------------------------------------------------------------  --------------  CALL BACK INFO  What is the best way for the office to contact you? OK to leave message on   voicemail  Preferred Call Back Phone Number? [phone]  ---------------------------------------------------------------------------  --------------  SCRIPT ANSWERS  Relationship to Patient?  Self

## 2022-02-23 ENCOUNTER — OFFICE VISIT (OUTPATIENT)
Dept: FAMILY MEDICINE CLINIC | Age: 52
End: 2022-02-23
Payer: COMMERCIAL

## 2022-02-23 ENCOUNTER — TELEPHONE (OUTPATIENT)
Dept: FAMILY MEDICINE CLINIC | Age: 52
End: 2022-02-23

## 2022-02-23 VITALS
BODY MASS INDEX: 31.8 KG/M2 | HEART RATE: 71 BPM | OXYGEN SATURATION: 97 % | DIASTOLIC BLOOD PRESSURE: 74 MMHG | SYSTOLIC BLOOD PRESSURE: 133 MMHG | TEMPERATURE: 98.2 F | WEIGHT: 209.8 LBS | HEIGHT: 68 IN

## 2022-02-23 DIAGNOSIS — Z20.2 POSSIBLE EXPOSURE TO STD: ICD-10-CM

## 2022-02-23 DIAGNOSIS — F90.0 ATTENTION DEFICIT HYPERACTIVITY DISORDER (ADHD), PREDOMINANTLY INATTENTIVE TYPE: ICD-10-CM

## 2022-02-23 DIAGNOSIS — Z20.2 POSSIBLE EXPOSURE TO STD: Primary | ICD-10-CM

## 2022-02-23 DIAGNOSIS — H60.542 DERMATITIS OF LEFT EAR CANAL: ICD-10-CM

## 2022-02-23 LAB
BILIRUBIN, POC: NORMAL
BLOOD URINE, POC: NORMAL
CLARITY, POC: CLEAR
COLOR, POC: NORMAL
GLUCOSE URINE, POC: NORMAL
KETONES, POC: NORMAL
LEUKOCYTE EST, POC: NORMAL
NITRITE, POC: NORMAL
PH, POC: 5.5
PROTEIN, POC: NORMAL
SPECIFIC GRAVITY, POC: 1.02
UROBILINOGEN, POC: 0.2

## 2022-02-23 PROCEDURE — G8427 DOCREV CUR MEDS BY ELIG CLIN: HCPCS | Performed by: INTERNAL MEDICINE

## 2022-02-23 PROCEDURE — 4130F TOPICAL PREP RX AOE: CPT | Performed by: INTERNAL MEDICINE

## 2022-02-23 PROCEDURE — 81002 URINALYSIS NONAUTO W/O SCOPE: CPT | Performed by: INTERNAL MEDICINE

## 2022-02-23 PROCEDURE — G8484 FLU IMMUNIZE NO ADMIN: HCPCS | Performed by: INTERNAL MEDICINE

## 2022-02-23 PROCEDURE — 99213 OFFICE O/P EST LOW 20 MIN: CPT | Performed by: INTERNAL MEDICINE

## 2022-02-23 PROCEDURE — G8417 CALC BMI ABV UP PARAM F/U: HCPCS | Performed by: INTERNAL MEDICINE

## 2022-02-23 PROCEDURE — 3017F COLORECTAL CA SCREEN DOC REV: CPT | Performed by: INTERNAL MEDICINE

## 2022-02-23 PROCEDURE — 4004F PT TOBACCO SCREEN RCVD TLK: CPT | Performed by: INTERNAL MEDICINE

## 2022-02-23 RX ORDER — OFLOXACIN 3 MG/ML
5 SOLUTION AURICULAR (OTIC) 2 TIMES DAILY
Qty: 10 ML | Refills: 0 | Status: SHIPPED | OUTPATIENT
Start: 2022-02-23 | End: 2022-03-15

## 2022-02-23 NOTE — PROGRESS NOTES
SUBJECTIVE:  Nellie Ortiz is a 46 y.o. male being evaluated for:    Chief Complaint   Patient presents with    Abstract      Patient is here for test on STD poss no symtoms going on .  Ear Drainage      Patient is having left ear drainge . HPI   Just found out the lady he has been seeing has been getting around and wants to be safe  No fevers abdominal pain dysuria  No penile discharge swelling  Rashes lesions or pain   Just heartache    Left ear pain off and on allthe time drains drys up and itches  Uses q tip with peroxide on it      Allergies   Allergen Reactions    Lisinopril Other (See Comments)     Cough     Current Outpatient Medications   Medication Sig Dispense Refill    ofloxacin (FLOXIN OTIC) 0.3 % otic solution Place 5 drops into the left ear 2 times daily for 20 days 10 mL 0    amphetamine-dextroamphetamine (ADDERALL, 30MG,) 30 MG tablet Take 1 tablet by mouth daily for 30 days. 30 tablet 0    fluticasone (FLONASE) 50 MCG/ACT nasal spray SPRAY 2 SPRAYS INTO EACH NOSTRIL EVERY DAY 1 each 1    loratadine (CLARITIN) 10 MG tablet Take 1 tablet by mouth daily 90 tablet 1    ibuprofen (ADVIL;MOTRIN) 600 MG tablet Take 1 tablet by mouth every 6 hours as needed for Pain 30 tablet 0    naproxen sodium (ALEVE) 220 MG tablet Take 2.5 tablets by mouth 2 times daily (with meals) (Patient taking differently: Take 550 mg by mouth as needed ) 40 tablet 0    Omega-3 Fatty Acids (FISH OIL) 1000 MG CAPS Take 2,000 mg by mouth daily        No current facility-administered medications for this visit.          Social History     Socioeconomic History    Marital status:      Spouse name: Not on file    Number of children: Not on file    Years of education: Not on file    Highest education level: Not on file   Occupational History    Not on file   Tobacco Use    Smoking status: Current Every Day Smoker     Packs/day: 0.50     Years: 35.00     Pack years: 17.50     Types: Cigarettes     Start date: 1/1/1988    Smokeless tobacco: Never Used   Vaping Use    Vaping Use: Never used   Substance and Sexual Activity    Alcohol use: Yes     Comment: occassional    Drug use: Never    Sexual activity: Yes     Partners: Female   Other Topics Concern    Not on file   Social History Narrative    Not on file     Social Determinants of Health     Financial Resource Strain: Unknown    Difficulty of Paying Living Expenses: Patient refused   Food Insecurity: Unknown    Worried About Running Out of Food in the Last Year: Patient refused   [address] in the Last Year: Patient refused   Transportation Needs: Unknown    Lack of Transportation (Medical): Patient refused    Lack of Transportation (Non-Medical): Patient refused   Physical Activity:     Days of Exercise per Week: Not on file   [employer] of Exercise per Session: Not on file   Stress:     Feeling of Stress : Not on file   Social Connections:     Frequency of Communication with Friends and Family: Not on file    Frequency of Social Gatherings with Friends and Family: Not on file    Attends Christian Services: Not on file    Active Member of [address] [place] [address] or Organizations: Not on file    Attends Club or Organization Meetings: Not on file    Marital Status: Not on file   Intimate Partner Violence:     Fear of Current or Ex-Partner: Not on file    Emotionally Abused: Not on file    Physically Abused: Not on file    Sexually Abused: Not on file   Housing Stability:     Unable to Pay for Housing in the Last Year: Not on file    Number of [place] in the Last Year: Not on file    Unstable Housing in the Last Year: Not on file      Past Medical History:   Diagnosis Date    ADD (attention deficit disorder)     Depression     Hypertension      Past Surgical History:   Procedure Laterality Date    FRACTURE SURGERY      right wrist    WISDOM TOOTH EXTRACTION         Review of Systems   Constitutional: Negative for activity change, chills, fatigue, fever and unexpected weight change. HENT: Negative for congestion, ear pain, nosebleeds, sinus pressure and sore throat. Left ear itching    Respiratory: Negative for cough and shortness of breath. Cardiovascular: Negative for chest pain, palpitations and leg swelling. Gastrointestinal: Negative for abdominal pain, diarrhea, nausea and vomiting. Genitourinary: Negative for dysuria, frequency, penile discharge, penile pain, penile swelling, scrotal swelling, testicular pain and urgency. Skin: Negative for rash and wound. Neurological: Negative for dizziness, light-headedness and headaches. Psychiatric/Behavioral: Positive for decreased concentration (doing well with medication ). Negative for agitation, behavioral problems, dysphoric mood, hallucinations, sleep disturbance and suicidal ideas. The patient is not nervous/anxious and is not hyperactive. OBJECTIVE:  /74 (Site: Left Upper Arm, Position: Sitting, Cuff Size: Medium Adult)   Pulse 71   Temp 98.2 °F (36.8 °C) (Oral)   Ht 5' 8\" (1.727 m)   Wt 209 lb 12.8 oz (95.2 kg)   SpO2 97%   BMI 31.90 kg/m²      Body mass index is 31.9 kg/m². Physical Exam  Vitals and nursing note reviewed. Constitutional:       General: He is not in acute distress. Appearance: Normal appearance. He is well-developed. HENT:      Head: Normocephalic and atraumatic. Right Ear: Tympanic membrane normal.      Left Ear: Tympanic membrane normal.      Ears:      Comments: Left ear canal scaling      Nose: Nose normal.      Mouth/Throat:      Pharynx: Oropharynx is clear. Eyes:      Conjunctiva/sclera: Conjunctivae normal.   Neck:      Thyroid: No thyromegaly. Cardiovascular:      Rate and Rhythm: Normal rate and regular rhythm. Heart sounds: Normal heart sounds. No murmur heard. No friction rub. No gallop. Pulmonary:      Effort: Pulmonary effort is normal.      Breath sounds: Normal breath sounds.    Chest:

## 2022-02-24 LAB
HIV AG/AB: NORMAL
HIV ANTIGEN: NORMAL
HIV-1 ANTIBODY: NORMAL
HIV-2 AB: NORMAL

## 2022-02-25 LAB
C. TRACHOMATIS DNA ,URINE: NEGATIVE
N. GONORRHOEAE DNA, URINE: NEGATIVE

## 2022-03-01 RX ORDER — FLUTICASONE PROPIONATE 50 MCG
SPRAY, SUSPENSION (ML) NASAL
Refills: 1 | OUTPATIENT
Start: 2022-03-01

## 2022-03-05 ASSESSMENT — ENCOUNTER SYMPTOMS
SORE THROAT: 0
SHORTNESS OF BREATH: 0
COUGH: 0
ABDOMINAL PAIN: 0
VOMITING: 0
NAUSEA: 0
SINUS PRESSURE: 0
DIARRHEA: 0

## 2022-03-11 DIAGNOSIS — F90.0 ATTENTION DEFICIT HYPERACTIVITY DISORDER (ADHD), PREDOMINANTLY INATTENTIVE TYPE: ICD-10-CM

## 2022-03-11 NOTE — TELEPHONE ENCOUNTER
----- Message from Raykathleenlissy Ailyn sent at 3/11/2022  1:16 PM EST -----  Subject: Refill Request    QUESTIONS  Name of Medication? amphetamine-dextroamphetamine (ADDERALL, 30MG,) 30 MG   tablet  Patient-reported dosage and instructions? 30 MG tablet   How many days do you have left? 3  [id number]  Pharmacy phone number (if available)? [phone]  ---------------------------------------------------------------------------  --------------  Isiah BELCHER  What is the best way for the office to contact you? OK to leave message on   voicemail  Preferred Call Back Phone Number?  [phone]

## 2022-03-14 RX ORDER — DEXTROAMPHETAMINE SACCHARATE, AMPHETAMINE ASPARTATE, DEXTROAMPHETAMINE SULFATE AND AMPHETAMINE SULFATE 7.5; 7.5; 7.5; 7.5 MG/1; MG/1; MG/1; MG/1
30 TABLET ORAL DAILY
Qty: 30 TABLET | Refills: 0 | Status: SHIPPED | OUTPATIENT
Start: 2022-03-17 | End: 2022-04-13 | Stop reason: SDUPTHER

## 2022-04-13 DIAGNOSIS — F90.0 ATTENTION DEFICIT HYPERACTIVITY DISORDER (ADHD), PREDOMINANTLY INATTENTIVE TYPE: ICD-10-CM

## 2022-04-13 RX ORDER — DEXTROAMPHETAMINE SACCHARATE, AMPHETAMINE ASPARTATE, DEXTROAMPHETAMINE SULFATE AND AMPHETAMINE SULFATE 7.5; 7.5; 7.5; 7.5 MG/1; MG/1; MG/1; MG/1
30 TABLET ORAL DAILY
Qty: 30 TABLET | Refills: 0 | Status: SHIPPED | OUTPATIENT
Start: 2022-04-14 | End: 2022-05-12 | Stop reason: SDUPTHER

## 2022-04-13 NOTE — TELEPHONE ENCOUNTER
Pt is needing refills on     amphetamine-dextroamphetamine (ADDERALL, 30MG,) 30 MG tablet     loratadine (CLARITIN) 10 MG tablet     [facility]/pharmacy #[id number], [place] - [address] [facility] [place] [phone] -  [phone]     LOV     2.1.2022  NOV

## 2022-04-18 RX ORDER — FLUTICASONE PROPIONATE 50 MCG
SPRAY, SUSPENSION (ML) NASAL
Qty: 1 EACH | Refills: 1 | Status: SHIPPED | OUTPATIENT
Start: 2022-04-18 | End: 2022-05-11

## 2022-05-11 ENCOUNTER — TELEPHONE (OUTPATIENT)
Dept: FAMILY MEDICINE CLINIC | Age: 52
End: 2022-05-11

## 2022-05-11 DIAGNOSIS — F90.0 ATTENTION DEFICIT HYPERACTIVITY DISORDER (ADHD), PREDOMINANTLY INATTENTIVE TYPE: ICD-10-CM

## 2022-05-11 RX ORDER — DEXTROAMPHETAMINE SACCHARATE, AMPHETAMINE ASPARTATE, DEXTROAMPHETAMINE SULFATE AND AMPHETAMINE SULFATE 7.5; 7.5; 7.5; 7.5 MG/1; MG/1; MG/1; MG/1
30 TABLET ORAL DAILY
Qty: 30 TABLET | Status: CANCELLED | OUTPATIENT
Start: 2022-05-11 | End: 2022-06-10

## 2022-05-11 RX ORDER — FLUTICASONE PROPIONATE 50 MCG
SPRAY, SUSPENSION (ML) NASAL
Qty: 16 G | Refills: 3 | Status: SHIPPED | OUTPATIENT
Start: 2022-05-11

## 2022-05-11 NOTE — TELEPHONE ENCOUNTER
Pt is needing a refill on amphetamine-dextroamphetamine (ADDERALL, 30MG,) 30 MG tablet       [facility]     LOV    2.1.22  NOV   5.12.22

## 2022-05-12 ENCOUNTER — OFFICE VISIT (OUTPATIENT)
Dept: FAMILY MEDICINE CLINIC | Age: 52
End: 2022-05-12
Payer: COMMERCIAL

## 2022-05-12 VITALS
BODY MASS INDEX: 29.77 KG/M2 | DIASTOLIC BLOOD PRESSURE: 80 MMHG | HEIGHT: 68 IN | SYSTOLIC BLOOD PRESSURE: 139 MMHG | WEIGHT: 196.4 LBS | OXYGEN SATURATION: 98 % | HEART RATE: 78 BPM | TEMPERATURE: 98.2 F

## 2022-05-12 DIAGNOSIS — J30.1 SEASONAL ALLERGIC RHINITIS DUE TO POLLEN: Primary | ICD-10-CM

## 2022-05-12 DIAGNOSIS — F90.0 ATTENTION DEFICIT HYPERACTIVITY DISORDER (ADHD), PREDOMINANTLY INATTENTIVE TYPE: ICD-10-CM

## 2022-05-12 PROCEDURE — G8417 CALC BMI ABV UP PARAM F/U: HCPCS | Performed by: INTERNAL MEDICINE

## 2022-05-12 PROCEDURE — G8427 DOCREV CUR MEDS BY ELIG CLIN: HCPCS | Performed by: INTERNAL MEDICINE

## 2022-05-12 PROCEDURE — 3017F COLORECTAL CA SCREEN DOC REV: CPT | Performed by: INTERNAL MEDICINE

## 2022-05-12 PROCEDURE — 99213 OFFICE O/P EST LOW 20 MIN: CPT | Performed by: INTERNAL MEDICINE

## 2022-05-12 PROCEDURE — 4004F PT TOBACCO SCREEN RCVD TLK: CPT | Performed by: INTERNAL MEDICINE

## 2022-05-12 RX ORDER — DEXTROAMPHETAMINE SACCHARATE, AMPHETAMINE ASPARTATE, DEXTROAMPHETAMINE SULFATE AND AMPHETAMINE SULFATE 7.5; 7.5; 7.5; 7.5 MG/1; MG/1; MG/1; MG/1
30 TABLET ORAL DAILY
Qty: 30 TABLET | Refills: 0 | Status: SHIPPED | OUTPATIENT
Start: 2022-05-12 | End: 2022-06-09 | Stop reason: SDUPTHER

## 2022-05-12 SDOH — ECONOMIC STABILITY: FOOD INSECURITY: WITHIN THE PAST 12 MONTHS, THE FOOD YOU BOUGHT JUST DIDN'T LAST AND YOU DIDN'T HAVE MONEY TO GET MORE.: NEVER TRUE

## 2022-05-12 SDOH — ECONOMIC STABILITY: TRANSPORTATION INSECURITY
IN THE PAST 12 MONTHS, HAS LACK OF TRANSPORTATION KEPT YOU FROM MEETINGS, WORK, OR FROM GETTING THINGS NEEDED FOR DAILY LIVING?: NO

## 2022-05-12 SDOH — ECONOMIC STABILITY: TRANSPORTATION INSECURITY
IN THE PAST 12 MONTHS, HAS THE LACK OF TRANSPORTATION KEPT YOU FROM MEDICAL APPOINTMENTS OR FROM GETTING MEDICATIONS?: NO

## 2022-05-12 SDOH — ECONOMIC STABILITY: FOOD INSECURITY: WITHIN THE PAST 12 MONTHS, YOU WORRIED THAT YOUR FOOD WOULD RUN OUT BEFORE YOU GOT MONEY TO BUY MORE.: NEVER TRUE

## 2022-05-12 ASSESSMENT — PATIENT HEALTH QUESTIONNAIRE - PHQ9
SUM OF ALL RESPONSES TO PHQ QUESTIONS 1-9: 0
1. LITTLE INTEREST OR PLEASURE IN DOING THINGS: 0
2. FEELING DOWN, DEPRESSED OR HOPELESS: 0
SUM OF ALL RESPONSES TO PHQ9 QUESTIONS 1 & 2: 0
SUM OF ALL RESPONSES TO PHQ QUESTIONS 1-9: 0

## 2022-05-12 ASSESSMENT — SOCIAL DETERMINANTS OF HEALTH (SDOH): HOW HARD IS IT FOR YOU TO PAY FOR THE VERY BASICS LIKE FOOD, HOUSING, MEDICAL CARE, AND HEATING?: NOT HARD AT ALL

## 2022-05-12 NOTE — PROGRESS NOTES
SUBJECTIVE:  Lynnette Tran is a 46 y.o. male being evaluated for:    Chief Complaint   Patient presents with    Follow-up      Patient is here for medication followup today .  Medication Refill       HPI   Add is fine  Concentration is doing well  Rough couple of months  Father has been ill  Busy with work and him    Nephews   of mi at young age  [de-identified] 2 months     Bad allergies and using claritin and flonase little worse with the weather changes   Allergies   Allergen Reactions    Lisinopril Other (See Comments)     Cough     Current Outpatient Medications   Medication Sig Dispense Refill    amphetamine-dextroamphetamine (ADDERALL, 30MG,) 30 MG tablet Take 1 tablet by mouth daily for 30 days. 30 tablet 0    fluticasone (FLONASE) 50 MCG/ACT nasal spray SPRAY 2 SPRAYS INTO EACH NOSTRIL EVERY DAY 16 g 3    loratadine (CLARITIN) 10 MG tablet Take 1 tablet by mouth daily 90 tablet 1    ibuprofen (ADVIL;MOTRIN) 600 MG tablet Take 1 tablet by mouth every 6 hours as needed for Pain 30 tablet 0    naproxen sodium (ALEVE) 220 MG tablet Take 2.5 tablets by mouth 2 times daily (with meals) (Patient taking differently: Take 550 mg by mouth as needed ) 40 tablet 0    Omega-3 Fatty Acids (FISH OIL) 1000 MG CAPS Take 2,000 mg by mouth daily        No current facility-administered medications for this visit.          Social History     Socioeconomic History    Marital status:      Spouse name: Not on file    Number of children: Not on file    Years of education: Not on file    Highest education level: Not on file   Occupational History    Not on file   Tobacco Use    Smoking status: Current Every Day Smoker     Packs/day: 0.50     Years: 35.00     Pack years: 17.50     Types: Cigarettes     Start date: 1988    Smokeless tobacco: Never Used   Vaping Use    Vaping Use: Never used   Substance and Sexual Activity    Alcohol use: Yes     Comment: occassional    Drug use: Never    Sexual activity: Yes     Partners: Female   Other Topics Concern    Not on file   Social History Narrative    Not on file     Social Determinants of Health     Financial Resource Strain: Low Risk     Difficulty of Paying Living Expenses: Not hard at all   Food Insecurity: No Food Insecurity    Worried About Running Out of Food in the Last Year: Never true    [address] in the Last Year: Never true   Transportation Needs: No Transportation Needs    Lack of Transportation (Medical): No    Lack of Transportation (Non-Medical): No   Physical Activity:     Days of Exercise per Week: Not on file    Minutes of Exercise per Session: Not on file   Stress:     Feeling of Stress : Not on file   Social Connections:     Frequency of Communication with Friends and Family: Not on file    Frequency of Social Gatherings with Friends and Family: Not on file    Attends [employer] Services: Not on file    Active Member of [address] [place] [address] [employer] or Organizations: Not on file    Attends Club or Organization Meetings: Not on file    Marital Status: Not on file   Intimate Partner Violence:     Fear of Current or Ex-Partner: Not on file    Emotionally Abused: Not on file    Physically Abused: Not on file    Sexually Abused: Not on file   Housing Stability:     Unable to Pay for Housing in the Last Year: Not on file    Number of [place] in the Last Year: Not on file    Unstable Housing in the Last Year: Not on file      Past Medical History:   Diagnosis Date    ADD (attention deficit disorder)     Depression     Hypertension      Past Surgical History:   Procedure Laterality Date    FRACTURE SURGERY      right wrist    WISDOM TOOTH EXTRACTION         Review of Systems   Constitutional: Positive for unexpected weight change (losing less eating ). Negative for activity change and fatigue. HENT: Positive for congestion, postnasal drip, rhinorrhea and sneezing. Negative for ear pain, nosebleeds and sinus pressure.     Eyes: Positive for discharge and itching. Respiratory: Negative for cough and shortness of breath. Cardiovascular: Negative for chest pain, palpitations and leg swelling. Gastrointestinal: Negative for abdominal pain, diarrhea, nausea and vomiting. Allergic/Immunologic: Positive for environmental allergies. Neurological: Negative for dizziness, light-headedness and headaches. Psychiatric/Behavioral: Positive for decreased concentration (doing well with medication ). Negative for agitation, behavioral problems, dysphoric mood, hallucinations, sleep disturbance and suicidal ideas. The patient is not nervous/anxious and is not hyperactive. OBJECTIVE:  /80 (Site: Right Upper Arm, Position: Sitting, Cuff Size: Medium Adult)   Pulse 78   Temp 98.2 °F (36.8 °C) (Temporal)   Ht 5' 8\" (1.727 m)   Wt 196 lb 6.4 oz (89.1 kg)   SpO2 98%   BMI 29.86 kg/m²      Body mass index is 29.86 kg/m². Physical Exam  Vitals and nursing note reviewed. Constitutional:       General: He is not in acute distress. Appearance: Normal appearance. He is well-developed. HENT:      Head: Normocephalic and atraumatic. Right Ear: Tympanic membrane and ear canal normal.      Left Ear: Tympanic membrane and ear canal normal.      Ears:      Comments: No sinus tenderness      Nose: Congestion present. Mouth/Throat:      Mouth: Mucous membranes are dry. Pharynx: Oropharynx is clear. Eyes:      Conjunctiva/sclera: Conjunctivae normal.   Neck:      Thyroid: No thyromegaly. Cardiovascular:      Rate and Rhythm: Normal rate and regular rhythm. Heart sounds: Normal heart sounds. No murmur heard. No friction rub. No gallop. Pulmonary:      Effort: Pulmonary effort is normal.      Breath sounds: Normal breath sounds. Chest:      Chest wall: No tenderness. Abdominal:      General: There is no distension. Palpations: Abdomen is soft. Tenderness: There is no abdominal tenderness. Comments: No HSM   Musculoskeletal:         General: No swelling. Cervical back: Neck supple. Lymphadenopathy:      Cervical: No cervical adenopathy. Skin:     General: Skin is warm and dry. Neurological:      General: No focal deficit present. Mental Status: He is alert. Gait: Gait normal.   Psychiatric:         Mood and Affect: Mood normal.         Behavior: Behavior normal.         Thought Content: Thought content normal.         ASSESSMENT/PLAN:    Sohan Ndiaye was seen today for follow-up and medication refill. Diagnoses and all orders for this visit:    Seasonal allergic rhinitis due to pollen continue claritin and flonase     Attention deficit hyperactivity disorder (ADHD), predominantly inattentive type reviewed [facility]  -     amphetamine-dextroamphetamine (ADDERALL, 30MG,) 30 MG tablet; Take 1 tablet by mouth daily for 30 days. Orders Placed This Encounter   Medications    amphetamine-dextroamphetamine (ADDERALL, 30MG,) 30 MG tablet     Sig: Take 1 tablet by mouth daily for 30 days. Dispense:  30 tablet     Refill:  0        Return in about 3 months (around 8/12/2022), or if symptoms worsen or fail to improve. There are no Patient Instructions on file for this visit.

## 2022-05-21 ASSESSMENT — ENCOUNTER SYMPTOMS
NAUSEA: 0
ABDOMINAL PAIN: 0
EYE ITCHING: 1
SINUS PRESSURE: 0
VOMITING: 0
RHINORRHEA: 1
DIARRHEA: 0
EYE DISCHARGE: 1
COUGH: 0
SHORTNESS OF BREATH: 0

## 2022-06-09 DIAGNOSIS — F90.0 ATTENTION DEFICIT HYPERACTIVITY DISORDER (ADHD), PREDOMINANTLY INATTENTIVE TYPE: ICD-10-CM

## 2022-06-09 RX ORDER — DEXTROAMPHETAMINE SACCHARATE, AMPHETAMINE ASPARTATE, DEXTROAMPHETAMINE SULFATE AND AMPHETAMINE SULFATE 7.5; 7.5; 7.5; 7.5 MG/1; MG/1; MG/1; MG/1
30 TABLET ORAL DAILY
Qty: 30 TABLET | Refills: 0 | Status: SHIPPED | OUTPATIENT
Start: 2022-06-10 | End: 2022-07-06 | Stop reason: SDUPTHER

## 2022-06-09 NOTE — TELEPHONE ENCOUNTER
Last OV: 5/12/2022  Next OV: Visit date not found              ----- Message from Marsha Mak sent at 6/9/2022  9:14 AM EDT -----  Subject: Refill Request    QUESTIONS  Name of Medication? amphetamine-dextroamphetamine (ADDERALL, 30MG,) 30 MG   tablet  Patient-reported dosage and instructions? 30 MG once daily   How many days do you have left? 2  [id number]  Pharmacy phone number (if available)? [phone]  ---------------------------------------------------------------------------  --------------  Lynne Rayo INFO  What is the best way for the office to contact you? OK to leave message on   voicemail  Preferred Call Back Phone Number? [phone]  ---------------------------------------------------------------------------  --------------  SCRIPT ANSWERS  Relationship to Patient?  Self

## 2022-07-06 DIAGNOSIS — F90.0 ATTENTION DEFICIT HYPERACTIVITY DISORDER (ADHD), PREDOMINANTLY INATTENTIVE TYPE: ICD-10-CM

## 2022-07-06 RX ORDER — DEXTROAMPHETAMINE SACCHARATE, AMPHETAMINE ASPARTATE, DEXTROAMPHETAMINE SULFATE AND AMPHETAMINE SULFATE 7.5; 7.5; 7.5; 7.5 MG/1; MG/1; MG/1; MG/1
30 TABLET ORAL DAILY
Qty: 30 TABLET | Refills: 0 | Status: SHIPPED | OUTPATIENT
Start: 2022-07-09 | End: 2022-08-04 | Stop reason: SDUPTHER

## 2022-07-06 RX ORDER — LORATADINE 10 MG/1
10 TABLET ORAL DAILY
Qty: 90 TABLET | Refills: 1 | Status: SHIPPED | OUTPATIENT
Start: 2022-07-06

## 2022-07-06 NOTE — TELEPHONE ENCOUNTER
----- Message from [facility] sent at 7/6/2022  4:03 PM EDT -----  Subject: Refill Request    QUESTIONS  Name of Medication? amphetamine-dextroamphetamine (ADDERALL, 30MG,) 30 MG   tablet  Patient-reported dosage and instructions? Take 1 tablet by mouth daily for   30 days. How many days do you have left? 5  [id number]  Pharmacy phone number (if available)? [phone]  ---------------------------------------------------------------------------  --------------,  Name of Medication? loratadine (CLARITIN) 10 MG tablet  Patient-reported dosage and instructions? Take 1 tablet by mouth daily  How many days do you have left? 4  [id number]  Pharmacy phone number (if available)? [phone]  ---------------------------------------------------------------------------  --------------  Neelam BELCHER  What is the best way for the office to contact you? OK to leave message on   voicemail  Preferred Call Back Phone Number? [phone]  ---------------------------------------------------------------------------  --------------  SCRIPT ANSWERS  Relationship to Patient?  Self

## 2022-07-12 ENCOUNTER — APPOINTMENT (OUTPATIENT)
Dept: GENERAL RADIOLOGY | Age: 52
End: 2022-07-12
Payer: COMMERCIAL

## 2022-07-12 ENCOUNTER — HOSPITAL ENCOUNTER (EMERGENCY)
Age: 52
Discharge: HOME | End: 2022-07-12
Attending: EMERGENCY MEDICINE
Payer: COMMERCIAL

## 2022-07-12 DIAGNOSIS — R07.9 RIGHT-SIDED CHEST PAIN: ICD-10-CM

## 2022-07-12 DIAGNOSIS — S22.41XA CLOSED FRACTURE OF MULTIPLE RIBS OF RIGHT SIDE, INITIAL ENCOUNTER: Primary | ICD-10-CM

## 2022-07-12 DIAGNOSIS — R03.0 ELEVATED BLOOD PRESSURE READING: ICD-10-CM

## 2022-07-12 PROCEDURE — 6370000000 HC RX 637 (ALT 250 FOR IP): Performed by: EMERGENCY MEDICINE

## 2022-07-12 PROCEDURE — 99284 EMERGENCY DEPT VISIT MOD MDM: CPT

## 2022-07-12 PROCEDURE — 71101 X-RAY EXAM UNILAT RIBS/CHEST: CPT

## 2022-07-12 PROCEDURE — 93005 ELECTROCARDIOGRAM TRACING: CPT | Performed by: EMERGENCY MEDICINE

## 2022-07-12 PROCEDURE — 96372 THER/PROPH/DIAG INJ SC/IM: CPT

## 2022-07-12 PROCEDURE — 6360000002 HC RX W HCPCS: Performed by: EMERGENCY MEDICINE

## 2022-07-12 RX ORDER — ACETAMINOPHEN 325 MG/1
650 TABLET ORAL ONCE
Status: COMPLETED | OUTPATIENT
Start: 2022-07-12 | End: 2022-07-12

## 2022-07-12 RX ORDER — LIDOCAINE 4 G/G
1 PATCH TOPICAL ONCE
Status: DISCONTINUED | OUTPATIENT
Start: 2022-07-12 | End: 2022-07-13 | Stop reason: HOSPADM

## 2022-07-12 RX ORDER — HYDROCODONE BITARTRATE AND ACETAMINOPHEN 5; 325 MG/1; MG/1
1 TABLET ORAL EVERY 8 HOURS PRN
Qty: 8 TABLET | Refills: 0 | Status: SHIPPED | OUTPATIENT
Start: 2022-07-12 | End: 2022-07-15

## 2022-07-12 RX ORDER — KETOROLAC TROMETHAMINE 30 MG/ML
30 INJECTION, SOLUTION INTRAMUSCULAR; INTRAVENOUS ONCE
Status: COMPLETED | OUTPATIENT
Start: 2022-07-12 | End: 2022-07-12

## 2022-07-12 RX ADMIN — KETOROLAC TROMETHAMINE 30 MG: 30 INJECTION, SOLUTION INTRAMUSCULAR at 22:44

## 2022-07-12 RX ADMIN — ACETAMINOPHEN 650 MG: 325 TABLET ORAL at 22:43

## 2022-07-12 ASSESSMENT — PAIN DESCRIPTION - ONSET: ONSET: SUDDEN

## 2022-07-12 ASSESSMENT — PAIN DESCRIPTION - FREQUENCY: FREQUENCY: CONTINUOUS

## 2022-07-12 ASSESSMENT — PAIN SCALES - GENERAL
PAINLEVEL_OUTOF10: 7
PAINLEVEL_OUTOF10: 5
PAINLEVEL_OUTOF10: 7

## 2022-07-12 ASSESSMENT — PAIN DESCRIPTION - PAIN TYPE: TYPE: ACUTE PAIN

## 2022-07-12 ASSESSMENT — PAIN - FUNCTIONAL ASSESSMENT
PAIN_FUNCTIONAL_ASSESSMENT: PREVENTS OR INTERFERES SOME ACTIVE ACTIVITIES AND ADLS
PAIN_FUNCTIONAL_ASSESSMENT: 0-10
PAIN_FUNCTIONAL_ASSESSMENT: 0-10

## 2022-07-12 ASSESSMENT — PAIN DESCRIPTION - LOCATION
LOCATION: RIB CAGE

## 2022-07-12 ASSESSMENT — ENCOUNTER SYMPTOMS
SHORTNESS OF BREATH: 0
CHEST TIGHTNESS: 0
NAUSEA: 0
VOMITING: 0
COUGH: 0
ABDOMINAL PAIN: 0
BACK PAIN: 0
COLOR CHANGE: 1

## 2022-07-12 ASSESSMENT — PAIN DESCRIPTION - ORIENTATION
ORIENTATION: RIGHT

## 2022-07-12 ASSESSMENT — PAIN DESCRIPTION - DESCRIPTORS
DESCRIPTORS: SHARP

## 2022-07-12 ASSESSMENT — LIFESTYLE VARIABLES: HOW OFTEN DO YOU HAVE A DRINK CONTAINING ALCOHOL: 2-4 TIMES A MONTH

## 2022-07-12 NOTE — Clinical Note
Fernie Acosta was seen and treated in our emergency department on 7/12/2022. He may return to work on 07/14/2022. May return sooner if feeling better      If you have any questions or concerns, please don't hesitate to call.       Everton Ch MD

## 2022-07-12 NOTE — Clinical Note
Evans Pierce was seen and treated in our emergency department on 7/12/2022. He may return to work on 07/14/2022. May return sooner if feeling better      If you have any questions or concerns, please don't hesitate to call.       Wilma Farnsworth MD

## 2022-07-13 VITALS
RESPIRATION RATE: 18 BRPM | DIASTOLIC BLOOD PRESSURE: 80 MMHG | TEMPERATURE: 99 F | HEART RATE: 68 BPM | BODY MASS INDEX: 30.62 KG/M2 | HEIGHT: 68 IN | WEIGHT: 202 LBS | SYSTOLIC BLOOD PRESSURE: 148 MMHG | OXYGEN SATURATION: 96 %

## 2022-07-13 NOTE — ED PROVIDER NOTES
[address]        Pt Name: Bert Talavera  MRN: [id number]  [place] 1970  Date of evaluation: 7/12/2022  Provider: Odalis Gill MD  PCP: Vijay Golden MD      CHIEF COMPLAINT       Chief Complaint   Patient presents with    Rib Injury     Patient playing football in house on July 5, fell onto edge of couch striking right upper chest/ribs. Pain increasing until patient can't cough without intense pain       HISTORY OFPRESENT ILLNESS   (Location/Symptom, Timing/Onset, Context/Setting, Quality, Duration, Modifying Factors,Severity)  Note limiting factors. Bert Talavera is a 45 y.o. male presenting today due to concern for playing football inside 1 week ago on July 5 and ultimately hitting the right side of his chest on the edge of the couch and developing significant pain following this worse with movements but stating it was overall tolerable until about 2 to 3 days ago when he woke up and was having more pain on the right side worse with movements and deep breaths. He denied any chest pain prior to hitting his chest on the couch and has had persistent chest pain since although it does wax and wane in intensity. He denies any leg swelling or history of blood clots. Denies any history of cardiac disease. He denies any abdominal pain or vomiting. He denies any fever or chills. He did just get his COVID and shingles vaccine earlier today. He tried Aleve at home but states that was not helping much with the pain and since it did seem to be worsening over the last 2 days or so, he came to the emergency department for further evaluation. He does smoke. He denies any numbness or weakness in the arms or legs. REVIEW OF SYSTEMS    (2-9 systems for level 4, 10 or more for level 5)     Review of Systems   Constitutional: Negative for chills, diaphoresis, fatigue and fever. HENT: Negative for congestion.     Respiratory: Negative for cough, chest tightness and shortness of breath (denies this, just hurts to breath on right side for the past week). Cardiovascular: Positive for chest pain (right sided). Negative for leg swelling. Gastrointestinal: Negative for abdominal pain, nausea and vomiting. Genitourinary: Negative for flank pain. Musculoskeletal: Negative for arthralgias, back pain, neck pain (denies any head or neck injury) and neck stiffness. Skin: Positive for color change (reported some bruising to right chest initially, resolved now). Negative for wound. Neurological: Negative for syncope, weakness, light-headedness, numbness and headaches. Psychiatric/Behavioral: Negative for confusion. The patient is not nervous/anxious. Positives and Pertinent negatives as per HPI. PASTMEDICAL HISTORY     Past Medical History:   Diagnosis Date    ADD (attention deficit disorder)     Depression     Hypertension          SURGICAL HISTORY       Past Surgical History:   Procedure Laterality Date    FRACTURE SURGERY      right wrist    WISDOM TOOTH EXTRACTION           CURRENT MEDICATIONS       Previous Medications    AMPHETAMINE-DEXTROAMPHETAMINE (ADDERALL, 30MG,) 30 MG TABLET    Take 1 tablet by mouth daily for 30 days.     FLUTICASONE (FLONASE) 50 MCG/ACT NASAL SPRAY    SPRAY 2 SPRAYS INTO EACH NOSTRIL EVERY DAY    LORATADINE (CLARITIN) 10 MG TABLET    Take 1 tablet by mouth daily    NAPROXEN SODIUM (ALEVE) 220 MG TABLET    Take 2.5 tablets by mouth 2 times daily (with meals)    OMEGA-3 FATTY ACIDS (FISH OIL) 1000 MG CAPS    Take 2,000 mg by mouth daily        ALLERGIES     Lisinopril    FAMILY HISTORY       Family History   Problem Relation Age of Onset    Kidney Disease Mother     High Cholesterol Father     Allergy (Severe) Sister     No Known Problems Brother     Diabetes Maternal Aunt     Diabetes Maternal Uncle     No Known Problems Maternal Grandmother     Diabetes Maternal Grandfather     No Known Problems Paternal Grandmother     No Known Problems Paternal Grandfather           SOCIAL HISTORY       Social History     Socioeconomic History    Marital status:      Spouse name: None    Number of children: None    Years of education: None    Highest education level: None   Occupational History    None   Tobacco Use    Smoking status: Current Every Day Smoker     Packs/day: 0.50     Years: 35.00     Pack years: 17.50     Types: Cigarettes     Start date: 1/1/1988    Smokeless tobacco: Never Used   Vaping Use    Vaping Use: Never used   Substance and Sexual Activity    Alcohol use: Yes     Comment: occassional    Drug use: Never    Sexual activity: Yes     Partners: Female   Other Topics Concern    None   Social History Narrative    None     Social Determinants of Health     Financial Resource Strain: Low Risk     Difficulty of Paying Living Expenses: Not hard at all   Food Insecurity: No Food Insecurity    Worried About Running Out of Food in the Last Year: Never true    Dontrell of Food in the Last Year: Never true   Transportation Needs: No Transportation Needs    Lack of Transportation (Medical): No    Lack of Transportation (Non-Medical):  No   Physical Activity:     Days of Exercise per Week: Not on file    Minutes of Exercise per Session: Not on file   Stress:     Feeling of Stress : Not on file   Social Connections:     Frequency of Communication with Friends and Family: Not on file    Frequency of Social Gatherings with Friends and Family: Not on file    Attends Mandaen Services: Not on file    Active Member of Clubs or Organizations: Not on file    Attends Club or Organization Meetings: Not on file    Marital Status: Not on file   Intimate Partner Violence:     Fear of Current or Ex-Partner: Not on file    Emotionally Abused: Not on file    Physically Abused: Not on file    Sexually Abused: Not on file   Housing Stability:     Unable to Pay for Housing in the Last Year: Not on file    Number of Places Lived in the Last Year: Not on file    Unstable Housing in the Last Year: Not on file       SCREENINGS    Doyle Coma Scale  Eye Opening: Spontaneous  Best Verbal Response: Oriented  Best Motor Response: Obeys commands  [place] Coma Scale Score: 15           PHYSICAL EXAM    (up to 7 for level 4, 8 or more for level 5)     ED Triage Vitals   BP Temp Temp src Pulse Resp SpO2 Height Weight   -- -- -- -- -- -- -- --       Physical Exam  Vitals and nursing note reviewed. Constitutional:       General: He is awake. He is not in acute distress. Appearance: Normal appearance. He is well-developed and well-groomed. He is obese. He is not ill-appearing, toxic-appearing or diaphoretic. Interventions: He is not intubated. HENT:      Head: Normocephalic and atraumatic. Right Ear: External ear normal.      Left Ear: External ear normal.      Nose: Nose normal.      Mouth/Throat:      Mouth: Mucous membranes are moist.   Eyes:      General: No scleral icterus. Right eye: No discharge. Left eye: No discharge. Neck:      Trachea: Trachea and phonation normal. No tracheal deviation. Cardiovascular:      Rate and Rhythm: Normal rate and regular rhythm. Pulses: Normal pulses. Radial pulses are 2+ on the right side and 2+ on the left side. Heart sounds: No friction rub. No gallop. Pulmonary:      Effort: Pulmonary effort is normal. No tachypnea, bradypnea, accessory muscle usage, prolonged expiration, respiratory distress or retractions. He is not intubated. Breath sounds: Normal breath sounds and air entry. No stridor. No decreased breath sounds, wheezing, rhonchi or rales. Chest:      Chest wall: Tenderness present. No lacerations, deformity, crepitus or edema. Abdominal:      General: Abdomen is flat. Bowel sounds are normal. There is no distension. Palpations: Abdomen is soft. Tenderness:  There is no abdominal tenderness. There is no right CVA tenderness, left CVA tenderness, guarding or rebound. Negative signs include Vazquez's sign and McBurney's sign. Musculoskeletal:         General: No tenderness, deformity or signs of injury. Normal range of motion. Right shoulder: No swelling, tenderness or bony tenderness. Normal range of motion. Cervical back: Full passive range of motion without pain, normal range of motion and neck supple. No edema, erythema, signs of trauma, rigidity, torticollis, tenderness, bony tenderness or crepitus. No pain with movement, spinous process tenderness or muscular tenderness. Normal range of motion. Thoracic back: No tenderness or bony tenderness. Lumbar back: No tenderness or bony tenderness. Right lower leg: No edema. Left lower leg: No edema. Skin:     General: Skin is warm and dry. Coloration: Skin is not jaundiced or pale. Findings: No erythema or rash. Neurological:      General: No focal deficit present. Mental Status: He is alert and oriented to person, place, and time. Mental status is at baseline. GCS: GCS eye subscore is 4. GCS verbal subscore is 5. GCS motor subscore is 6. Cranial Nerves: No dysarthria or facial asymmetry. Sensory: Sensation is intact. No sensory deficit. Motor: Motor function is intact. No weakness, tremor, atrophy, abnormal muscle tone or seizure activity. Psychiatric:         Attention and Perception: Attention normal.         Mood and Affect: Mood and affect normal.         Speech: Speech normal. He is communicative. Speech is not slurred. Behavior: Behavior normal. Behavior is cooperative. DIAGNOSTIC RESULTS   :    Labs Reviewed - No data to display    All other labs were within normal range or not returned asof this dictation. EKG:  All EKG's are interpreted by the Emergency Department Physician who either signs or Co-signs this chart in the absence of a cardiologist.    The Ekg interpreted by me shows  normal sinus rhythm with a rate of 60  Axis is   Normal  QTc is  normal  Intervals and Durations are unremarkable. ST Segments: no acute change and normal  No significant change from prior EKG dated - 3/7/14  No STEMI           RADIOLOGY:   Non-plain film images such as CT, Ultrasound and MRI are read by the radiologist. [facility] images are visualized and preliminarily interpreted by the  ED Provider with the belowfindings:        Interpretation per the Radiologist below, if available at the time of this note:    XR RIBS RIGHT INCLUDE CHEST (MIN 3 VIEWS)   Final Result   Mildly displaced lateral right 5th, 6th and 7th rib fractures. No acute   airspace disease or pneumothorax identified. PROCEDURES   Unless otherwise noted below, none     Procedures    CRITICAL CARE TIME   N/A    CONSULTS:  None    EMERGENCY DEPARTMENT COURSE and DIFFERENTIAL DIAGNOSIS/MDM:   Vitals:    Vitals:    07/12/22 2215   BP: (!) 151/83   Pulse: 65   Resp: 16   Temp: 99.1 °F (37.3 °C)   TempSrc: Tympanic   SpO2: 94%   Weight: 202 lb (91.6 kg)   Height: 5' 8\" (1.727 m)       Patient was given the following medications:  Medications   lidocaine 4 % external patch 1 patch (1 patch TransDERmal Patch Applied 7/12/22 2248)   ketorolac (TORADOL) injection 30 mg (30 mg IntraMUSCular Given 7/12/22 2244)   acetaminophen (TYLENOL) tablet 650 mg (650 mg Oral Given 7/12/22 2243)     Patient was evaluated due to hitting his right side of his chest 1 week ago and having pain since that time although worsening over the last couple of days associated with deep breaths. I am concerned about rib fracture, pneumothorax, rib contusion, amongst other pathology. Pain started directly after trauma and story not suggestive of pulmonary embolism at this time. He denies any shortness of breath and story not suggestive of acute coronary syndrome. EKG was reassuring.   Chest x-ray was concerning for right-sided rib fractures which would explain his pain and I did not see any sign of a pneumothorax. Radiologist also confirmed these findings. He was told to take Tylenol or Motrin for pain but Norco for breakthrough pain but do not drive or operate machinery on this and understand it can be addictive. He knows to return to the emergency department for worsening chest pain with shortness of breath, fever, significant cough, but otherwise follow-up as an outpatient with his primary doctor over the next couple of days for further assessment. He was well-appearing and in no acute distress at time of discharge and felt comfortable with this plan. I discussed the nature and purpose, risks and benefits, as well as, the alternatives of opiates for pain relief with Gopal Palma. The risks discussed included but were not limited to overdose, addiction, dependence, and tolerance. Gopal Palma was given the time and opportunity to ask questions and consider their options, and after the discussion, Gopal Palma decided to verbally consent to opiates. Prior to consenting, I believe that Gopal Palma has the capacity to make this medical decision. The patient tolerated their visit well. The patient and / or the family were informed of the results of any tests, a time was given to answer questions. FINAL IMPRESSION      1. Closed fracture of multiple ribs of right side, initial encounter    2. Elevated blood pressure reading    3.  Right-sided chest pain          DISPOSITION/PLAN   DISPOSITION Decision To Discharge 07/12/2022 10:50:41 PM      PATIENT REFERRED TO:  [place] [address]  [phone]  Go to   If symptoms worsen    Gilda Zelaya MD  [address] [place] [address] [address] [address] [phone]    In 2 days  For repeat evaluation      DISCHARGEMEDICATIONS:  New Prescriptions    HYDROCODONE-ACETAMINOPHEN (NORCO) 5-325 MG PER TABLET    Take 1 tablet by mouth every 8 hours as needed for Pain (breakthrough pain, do not drive with this) for up to 3 days.        DISCONTINUED MEDICATIONS:  Discontinued Medications    IBUPROFEN (ADVIL;MOTRIN) 600 MG TABLET    Take 1 tablet by mouth every 6 hours as needed for Pain              (Please note that portions of this note were completed with a voicerecognition program.  Efforts were made to edit the dictations but occasionally words are mis-transcribed.)    Desmond Last MD (electronically signed)            Desmond Last MD  07/12/22 1272

## 2022-07-13 NOTE — ED NOTES
Discharge instructions reviewed with patient and verbalized understanding, denies further questions and successful teach back occurred. Offered wheelchair for discharge and declined. Discharged ambulatory with steady gait to ED lobby. Written discharge instructions, work note and prescription x1 provided to patient.       David De La Fuente RN  07/13/22 0028

## 2022-07-13 NOTE — ED NOTES
Dr. Miller Germania in room to discuss radiology results and discharge plan of care with patient.       Romy Hurt RN  07/13/22 0013

## 2022-07-13 NOTE — ED TRIAGE NOTES
Patient ambulatory to room 5 with c/o right sided rib injury with pain and swelling to right side of chest. Patient states he was playing football in his house on July 5, fell onto the edge of the couch striking is right upper chest/ribs. Patient states pain has been getting worse since the injury and last night started to spread to under his right breast with swelling. Patient concerned today when he had intense pain with sneezing. He denies shortness of breath, denies fever or other symptoms. He is awake alert, oriented, respirations easy & regular, skin w/d, MMM & pink, cap refill brisk. Dr. Dickson Freeze in room to examine patient.

## 2022-07-14 LAB
EKG ATRIAL RATE: 60 BPM
EKG DIAGNOSIS: NORMAL
EKG P AXIS: 46 DEGREES
EKG P-R INTERVAL: 126 MS
EKG Q-T INTERVAL: 432 MS
EKG QRS DURATION: 110 MS
EKG QTC CALCULATION (BAZETT): 432 MS
EKG R AXIS: 14 DEGREES
EKG T AXIS: 46 DEGREES
EKG VENTRICULAR RATE: 60 BPM

## 2022-07-14 PROCEDURE — 93010 ELECTROCARDIOGRAM REPORT: CPT | Performed by: INTERNAL MEDICINE

## 2022-07-19 ENCOUNTER — OFFICE VISIT (OUTPATIENT)
Dept: FAMILY MEDICINE CLINIC | Age: 52
End: 2022-07-19
Payer: COMMERCIAL

## 2022-07-19 VITALS
HEIGHT: 68 IN | BODY MASS INDEX: 29.92 KG/M2 | DIASTOLIC BLOOD PRESSURE: 80 MMHG | TEMPERATURE: 98.5 F | SYSTOLIC BLOOD PRESSURE: 138 MMHG | WEIGHT: 197.4 LBS

## 2022-07-19 DIAGNOSIS — S22.41XD CLOSED FRACTURE OF MULTIPLE RIBS OF RIGHT SIDE WITH ROUTINE HEALING, SUBSEQUENT ENCOUNTER: Primary | ICD-10-CM

## 2022-07-19 PROCEDURE — 3017F COLORECTAL CA SCREEN DOC REV: CPT | Performed by: NURSE PRACTITIONER

## 2022-07-19 PROCEDURE — 4004F PT TOBACCO SCREEN RCVD TLK: CPT | Performed by: NURSE PRACTITIONER

## 2022-07-19 PROCEDURE — G8417 CALC BMI ABV UP PARAM F/U: HCPCS | Performed by: NURSE PRACTITIONER

## 2022-07-19 PROCEDURE — G8427 DOCREV CUR MEDS BY ELIG CLIN: HCPCS | Performed by: NURSE PRACTITIONER

## 2022-07-19 PROCEDURE — 99213 OFFICE O/P EST LOW 20 MIN: CPT | Performed by: NURSE PRACTITIONER

## 2022-07-19 RX ORDER — HYDROCODONE BITARTRATE AND ACETAMINOPHEN 5; 325 MG/1; MG/1
1 TABLET ORAL EVERY 8 HOURS PRN
Qty: 21 TABLET | Refills: 0 | Status: SHIPPED | OUTPATIENT
Start: 2022-07-19 | End: 2022-07-26

## 2022-07-19 ASSESSMENT — PATIENT HEALTH QUESTIONNAIRE - PHQ9
SUM OF ALL RESPONSES TO PHQ QUESTIONS 1-9: 0
SUM OF ALL RESPONSES TO PHQ9 QUESTIONS 1 & 2: 0
1. LITTLE INTEREST OR PLEASURE IN DOING THINGS: 0
SUM OF ALL RESPONSES TO PHQ QUESTIONS 1-9: 0
SUM OF ALL RESPONSES TO PHQ QUESTIONS 1-9: 0
2. FEELING DOWN, DEPRESSED OR HOPELESS: 0
SUM OF ALL RESPONSES TO PHQ QUESTIONS 1-9: 0

## 2022-07-19 ASSESSMENT — ENCOUNTER SYMPTOMS
COUGH: 1
ABDOMINAL PAIN: 0
WHEEZING: 0
SHORTNESS OF BREATH: 0

## 2022-07-19 NOTE — PROGRESS NOTES
Claudine Desir (:  1970) is a 45 y.o. male,Established patient, here for evaluation of the following chief complaint(s):  ED Follow-up (ED follow up- right fractured ribs. Patient given [address] as needed and continues with pain)         ASSESSMENT/PLAN:  1. Closed fracture of multiple ribs of right side with routine healing, subsequent encounter  Improving, PDMP reviewed. Do not take medication with alcohol, while driving or operating heavy machinary.   - HYDROcodone-acetaminophen (NORCO) 5-325 MG per tablet; Take 1 tablet by mouth every 8 hours as needed for Pain for up to 7 days. Intended supply: 7 days. Take lowest dose possible to manage pain  Dispense: 21 tablet; Refill: 0     Return if symptoms worsen or fail to improve. Subjective   SUBJECTIVE/OBJECTIVE:  HPI  Presents to office today for ED follow up for rib fractures on right side. States on 2022 he was playing football and fell into edge of couch on his right side, had initial pain but dealt with it. Pain then progressed and became constant and severe,  went to ED 2022, Rib xray confirmed fx to right 5th, 6th, 7th ribs. Rx of norco given to pt for 3 days. Breathing is doing well, denies sob. States with certain movements, cough and sneezing pain is intense. States pain is slowly getting better but pain is ongoing, not a a lot of relief with aleve. Requesting pain medication to help sleep and so he can complete ADLs. States he is trying to use the IS but limited due to pain. Pt continues to smoke, not ready to quit. Current Outpatient Medications   Medication Sig Dispense Refill    HYDROcodone-acetaminophen (NORCO) 5-325 MG per tablet Take 1 tablet by mouth every 8 hours as needed for Pain for up to 7 days. Intended supply: 7 days.  Take lowest dose possible to manage pain 21 tablet 0    loratadine (CLARITIN) 10 MG tablet Take 1 tablet by mouth daily 90 tablet 1    amphetamine-dextroamphetamine (ADDERALL, 30MG,) 30 MG tablet Take 1 tablet by mouth daily for 30 days. 30 tablet 0    fluticasone (FLONASE) 50 MCG/ACT nasal spray SPRAY 2 SPRAYS INTO EACH NOSTRIL EVERY DAY 16 g 3    naproxen sodium (ALEVE) 220 MG tablet Take 2.5 tablets by mouth 2 times daily (with meals) (Patient taking differently: Take 550 mg by mouth as needed) 40 tablet 0    Omega-3 Fatty Acids (FISH OIL) 1000 MG CAPS Take 2,000 mg by mouth daily        No current facility-administered medications for this visit. Past Medical History:   Diagnosis Date    ADD (attention deficit disorder)     Depression     Hypertension         Review of Systems   Constitutional:  Positive for activity change. Negative for fever. Respiratory:  Positive for cough. Negative for shortness of breath and wheezing. Cardiovascular:  Negative for chest pain and leg swelling. Gastrointestinal:  Negative for abdominal pain. Musculoskeletal:  Positive for arthralgias (right side rib pain). Skin: Negative. Objective   Physical Exam  Constitutional:       General: He is not in acute distress. Neck:      Vascular: No carotid bruit. Cardiovascular:      Rate and Rhythm: Normal rate and regular rhythm. Pulses: Normal pulses. Heart sounds: Normal heart sounds. Pulmonary:      Effort: Pulmonary effort is normal.      Breath sounds: Normal breath sounds. No wheezing or rales. Chest:      Chest wall: Tenderness (right side, no crepitus) present. Neurological:      Mental Status: He is alert.             --KYLE Jacome NP

## 2022-07-19 NOTE — PATIENT INSTRUCTIONS
Thank you for choosing [facility]. Please bring a current list of medications to every appointment. Please contact your pharmacy for any prescription refill(s) that you are requesting.

## 2022-07-30 NOTE — TELEPHONE ENCOUNTER
Smokes 1 pack a day  Will start with 21 mg patch and then wean down monthly Moderna dose 1, 2, and 3

## 2022-08-02 DIAGNOSIS — F90.0 ATTENTION DEFICIT HYPERACTIVITY DISORDER (ADHD), PREDOMINANTLY INATTENTIVE TYPE: ICD-10-CM

## 2022-08-02 RX ORDER — DEXTROAMPHETAMINE SACCHARATE, AMPHETAMINE ASPARTATE, DEXTROAMPHETAMINE SULFATE AND AMPHETAMINE SULFATE 7.5; 7.5; 7.5; 7.5 MG/1; MG/1; MG/1; MG/1
30 TABLET ORAL DAILY
Qty: 30 TABLET | Status: CANCELLED | OUTPATIENT
Start: 2022-08-02 | End: 2022-09-01

## 2022-08-02 NOTE — TELEPHONE ENCOUNTER
----- Message from Armando Dubon sent at 8/2/2022  4:08 PM EDT -----  Subject: Refill Request    QUESTIONS  Name of Medication? amphetamine-dextroamphetamine (ADDERALL, 30MG,) 30 MG   tablet  Patient-reported dosage and instructions? 30 mg   How many days do you have left? 7  Preferred Pharmacy? [id number]  Pharmacy phone number (if available)? [phone]  ---------------------------------------------------------------------------  --------------  Emelina Morales INFO  What is the best way for the office to contact you? OK to leave message on   voicemail  Preferred Call Back Phone Number? [phone]  ---------------------------------------------------------------------------  --------------  SCRIPT ANSWERS  Relationship to Patient?  Self

## 2022-08-03 NOTE — TELEPHONE ENCOUNTER
Speak with the patient and he said he got medication last month on 10 th and due for medication on 10 th .  Patient have appointment on 08/30/2022

## 2022-08-04 ENCOUNTER — TELEPHONE (OUTPATIENT)
Dept: FAMILY MEDICINE CLINIC | Age: 52
End: 2022-08-04

## 2022-08-04 DIAGNOSIS — F90.0 ATTENTION DEFICIT HYPERACTIVITY DISORDER (ADHD), PREDOMINANTLY INATTENTIVE TYPE: ICD-10-CM

## 2022-08-04 RX ORDER — DEXTROAMPHETAMINE SACCHARATE, AMPHETAMINE ASPARTATE, DEXTROAMPHETAMINE SULFATE AND AMPHETAMINE SULFATE 7.5; 7.5; 7.5; 7.5 MG/1; MG/1; MG/1; MG/1
30 TABLET ORAL DAILY
Qty: 30 TABLET | Refills: 0 | Status: SHIPPED | OUTPATIENT
Start: 2022-08-06 | End: 2022-08-30 | Stop reason: SDUPTHER

## 2022-08-04 NOTE — TELEPHONE ENCOUNTER
Patient wants refill on Adderall . Speak with the patient and he said he got medication last month on 10 th. Patient have upcoming appointment on 08/30/2022 .

## 2022-08-30 ENCOUNTER — OFFICE VISIT (OUTPATIENT)
Dept: FAMILY MEDICINE CLINIC | Age: 52
End: 2022-08-30
Payer: COMMERCIAL

## 2022-08-30 VITALS
DIASTOLIC BLOOD PRESSURE: 86 MMHG | WEIGHT: 198.2 LBS | RESPIRATION RATE: 16 BRPM | HEART RATE: 52 BPM | TEMPERATURE: 98 F | SYSTOLIC BLOOD PRESSURE: 146 MMHG | OXYGEN SATURATION: 97 % | BODY MASS INDEX: 30.14 KG/M2

## 2022-08-30 DIAGNOSIS — F90.0 ATTENTION DEFICIT HYPERACTIVITY DISORDER (ADHD), PREDOMINANTLY INATTENTIVE TYPE: ICD-10-CM

## 2022-08-30 DIAGNOSIS — Z91.09 ENVIRONMENTAL ALLERGIES: Primary | ICD-10-CM

## 2022-08-30 DIAGNOSIS — Z51.81 MEDICATION MONITORING ENCOUNTER: ICD-10-CM

## 2022-08-30 PROCEDURE — 4004F PT TOBACCO SCREEN RCVD TLK: CPT | Performed by: INTERNAL MEDICINE

## 2022-08-30 PROCEDURE — 3017F COLORECTAL CA SCREEN DOC REV: CPT | Performed by: INTERNAL MEDICINE

## 2022-08-30 PROCEDURE — G8427 DOCREV CUR MEDS BY ELIG CLIN: HCPCS | Performed by: INTERNAL MEDICINE

## 2022-08-30 PROCEDURE — G8417 CALC BMI ABV UP PARAM F/U: HCPCS | Performed by: INTERNAL MEDICINE

## 2022-08-30 PROCEDURE — 99213 OFFICE O/P EST LOW 20 MIN: CPT | Performed by: INTERNAL MEDICINE

## 2022-08-30 RX ORDER — DEXTROAMPHETAMINE SACCHARATE, AMPHETAMINE ASPARTATE, DEXTROAMPHETAMINE SULFATE AND AMPHETAMINE SULFATE 7.5; 7.5; 7.5; 7.5 MG/1; MG/1; MG/1; MG/1
30 TABLET ORAL DAILY
Qty: 30 TABLET | Refills: 0 | Status: SHIPPED | OUTPATIENT
Start: 2022-09-06 | End: 2022-10-03 | Stop reason: SDUPTHER

## 2022-08-30 RX ORDER — METHYLPREDNISOLONE 4 MG/1
TABLET ORAL
Qty: 1 KIT | Refills: 0 | Status: SHIPPED | OUTPATIENT
Start: 2022-08-30 | End: 2022-09-05

## 2022-08-30 RX ORDER — MONTELUKAST SODIUM 10 MG/1
10 TABLET ORAL DAILY
Qty: 30 TABLET | Refills: 3 | Status: SHIPPED | OUTPATIENT
Start: 2022-08-30 | End: 2022-09-21

## 2022-08-30 ASSESSMENT — PATIENT HEALTH QUESTIONNAIRE - PHQ9
SUM OF ALL RESPONSES TO PHQ QUESTIONS 1-9: 0
SUM OF ALL RESPONSES TO PHQ9 QUESTIONS 1 & 2: 0
SUM OF ALL RESPONSES TO PHQ QUESTIONS 1-9: 0
2. FEELING DOWN, DEPRESSED OR HOPELESS: 0
1. LITTLE INTEREST OR PLEASURE IN DOING THINGS: 0

## 2022-08-30 NOTE — PROGRESS NOTES
SUBJECTIVE:  Jackson Oleary is a 45 y.o. male being evaluated for:    Chief Complaint   Patient presents with    ADHD     3 month med check for refills on adderall. HPI   For the last 3 days are so  sinuses are jacked up  Missed work  Head congestion and sinus headaches frontal.  Ears draining Runny nose clear with twinge of yellow  NO postnasal drip  No coughing wheezing or sob   Using claritin not helping   Not using his flonase   Using ibuprofen   Rib fractures healing Doing well   Allergies   Allergen Reactions    Lisinopril Other (See Comments)     Cough     Current Outpatient Medications   Medication Sig Dispense Refill    methylPREDNISolone (MEDROL DOSEPACK) 4 MG tablet Take by mouth as directed per package . 1 kit 0    montelukast (SINGULAIR) 10 MG tablet Take 1 tablet by mouth daily 30 tablet 3    [START ON 9/6/2022] amphetamine-dextroamphetamine (ADDERALL, 30MG,) 30 MG tablet Take 1 tablet by mouth daily for 30 days. 30 tablet 0    loratadine (CLARITIN) 10 MG tablet Take 1 tablet by mouth daily 90 tablet 1    fluticasone (FLONASE) 50 MCG/ACT nasal spray SPRAY 2 SPRAYS INTO EACH NOSTRIL EVERY DAY 16 g 3    naproxen sodium (ALEVE) 220 MG tablet Take 2.5 tablets by mouth 2 times daily (with meals) (Patient taking differently: Take 550 mg by mouth as needed) 40 tablet 0    Omega-3 Fatty Acids (FISH OIL) 1000 MG CAPS Take 2,000 mg by mouth       No current facility-administered medications for this visit.          Social History     Socioeconomic History    Marital status:      Spouse name: Not on file    Number of children: Not on file    Years of education: Not on file    Highest education level: Not on file   Occupational History    Not on file   Tobacco Use    Smoking status: Every Day     Packs/day: 0.50     Years: 35.00     Pack years: 17.50     Types: Cigarettes     Start date: 1/1/1988    Smokeless tobacco: Never   Vaping Use    Vaping Use: Never used   Substance and Sexual Activity Alcohol use: Yes     Comment: occassional    Drug use: Never    Sexual activity: Yes     Partners: Female   Other Topics Concern    Not on file   Social History Narrative    Not on file     Social Determinants of Health     Financial Resource Strain: Low Risk     Difficulty of Paying Living Expenses: Not hard at all   Food Insecurity: No Food Insecurity    Worried About Running Out of Food in the Last Year: Never true    [address] in the Last Year: Never true   Transportation Needs: No Transportation Needs    Lack of Transportation (Medical): No    Lack of Transportation (Non-Medical): No   Physical Activity: Not on file   Stress: Not on file   Social Connections: Not on file   Intimate Partner Violence: Not on file   Housing Stability: Not on file      Past Medical History:   Diagnosis Date    ADD (attention deficit disorder)     Depression     Hypertension      Past Surgical History:   Procedure Laterality Date    FRACTURE SURGERY      right wrist    WISDOM TOOTH EXTRACTION         Review of Systems   Constitutional:  Positive for unexpected weight change (losing less eating ). Negative for activity change and fatigue. HENT:  Positive for congestion, ear pain, rhinorrhea, sinus pressure, sinus pain, sneezing and tinnitus. Negative for nosebleeds and postnasal drip. Eyes:  Positive for discharge and itching. Negative for visual disturbance. Respiratory:  Negative for cough, shortness of breath and wheezing. Cardiovascular:  Negative for chest pain, palpitations and leg swelling. Gastrointestinal:  Negative for abdominal pain, diarrhea, nausea and vomiting. Musculoskeletal:  Negative for arthralgias and back pain. Skin:  Negative for rash. Allergic/Immunologic: Positive for environmental allergies. Neurological:  Positive for headaches. Negative for dizziness, syncope, weakness, light-headedness and numbness. Psychiatric/Behavioral:  Positive for decreased concentration.  Negative for agitation, behavioral problems, dysphoric mood, hallucinations, sleep disturbance and suicidal ideas. The patient is hyperactive. The patient is not nervous/anxious. OBJECTIVE:  BP (!) 146/86   Pulse 52   Temp 98 °F (36.7 °C) (Oral)   Resp 16   Wt 198 lb 3.2 oz (89.9 kg)   SpO2 97%   BMI 30.14 kg/m²      Body mass index is 30.14 kg/m². Physical Exam  Vitals and nursing note reviewed. Constitutional:       General: He is not in acute distress. Appearance: Normal appearance. He is well-developed. HENT:      Head: Normocephalic and atraumatic. Right Ear: Tympanic membrane and ear canal normal.      Left Ear: Tympanic membrane and ear canal normal.      Ears:      Comments: No sinus tenderness      Nose: Congestion present. Mouth/Throat:      Mouth: Mucous membranes are dry. Pharynx: Oropharynx is clear. Eyes:      Conjunctiva/sclera: Conjunctivae normal.   Neck:      Thyroid: No thyromegaly. Cardiovascular:      Rate and Rhythm: Normal rate and regular rhythm. Heart sounds: Normal heart sounds. No murmur heard. No friction rub. No gallop. Pulmonary:      Effort: Pulmonary effort is normal.      Breath sounds: Normal breath sounds. Chest:      Chest wall: No tenderness. Abdominal:      General: There is no distension. Palpations: Abdomen is soft. Tenderness: There is no abdominal tenderness. Comments: No HSM   Musculoskeletal:         General: No swelling. Cervical back: Neck supple. Lymphadenopathy:      Cervical: No cervical adenopathy. Skin:     General: Skin is warm and dry. Neurological:      General: No focal deficit present. Mental Status: He is alert. Gait: Gait normal.   Psychiatric:         Mood and Affect: Mood normal.         Behavior: Behavior normal.         Thought Content: Thought content normal.       ASSESSMENT/PLAN:    Veronica Lennon was seen today for adhd.     Diagnoses and all orders for this visit:    Environmental allergies  -     methylPREDNISolone (MEDROL DOSEPACK) 4 MG tablet; Take by mouth as directed per package . -     montelukast (SINGULAIR) 10 MG tablet; Take 1 tablet by mouth daily    Attention deficit hyperactivity disorder (ADHD), predominantly inattentive type reviewed oarrs   -     Complete Urine Tox Screen; Future  -     amphetamine-dextroamphetamine (ADDERALL, 30MG,) 30 MG tablet; Take 1 tablet by mouth daily for 30 days. Medication monitoring encounter  -     Complete Urine Tox Screen; Future    Other orders        Orders Placed This Encounter   Medications    methylPREDNISolone (MEDROL DOSEPACK) 4 MG tablet     Sig: Take by mouth as directed per package . Dispense:  1 kit     Refill:  0    montelukast (SINGULAIR) 10 MG tablet     Sig: Take 1 tablet by mouth daily     Dispense:  30 tablet     Refill:  3    amphetamine-dextroamphetamine (ADDERALL, 30MG,) 30 MG tablet     Sig: Take 1 tablet by mouth daily for 30 days. Dispense:  30 tablet     Refill:  0        Return in about 3 months (around 11/30/2022), or if symptoms worsen or fail to improve, for med follow up . There are no Patient Instructions on file for this visit.

## 2022-08-31 LAB
AMPHETAMINE SCREEN, URINE: NORMAL
BARBITURATE SCREEN URINE: NORMAL
BENZODIAZEPINE SCREEN, URINE: NORMAL
CANNABINOID SCREEN URINE: NORMAL
COCAINE METABOLITE SCREEN URINE: NORMAL
Lab: NORMAL
METHADONE SCREEN, URINE: NORMAL
OPIATE SCREEN URINE: NORMAL
OXYCODONE URINE: NORMAL
PH UA: 5.5
PHENCYCLIDINE SCREEN URINE: NORMAL
PROPOXYPHENE SCREEN: NORMAL

## 2022-09-04 ASSESSMENT — ENCOUNTER SYMPTOMS
EYE DISCHARGE: 1
WHEEZING: 0
SINUS PAIN: 1
DIARRHEA: 0
SINUS PRESSURE: 1
SHORTNESS OF BREATH: 0
COUGH: 0
BACK PAIN: 0
NAUSEA: 0
RHINORRHEA: 1
EYE ITCHING: 1
VOMITING: 0
ABDOMINAL PAIN: 0

## 2022-09-21 RX ORDER — MONTELUKAST SODIUM 10 MG/1
TABLET ORAL
Qty: 30 TABLET | Refills: 3 | Status: SHIPPED | OUTPATIENT
Start: 2022-09-21

## 2022-10-03 DIAGNOSIS — F90.0 ATTENTION DEFICIT HYPERACTIVITY DISORDER (ADHD), PREDOMINANTLY INATTENTIVE TYPE: ICD-10-CM

## 2022-10-03 RX ORDER — DEXTROAMPHETAMINE SACCHARATE, AMPHETAMINE ASPARTATE, DEXTROAMPHETAMINE SULFATE AND AMPHETAMINE SULFATE 7.5; 7.5; 7.5; 7.5 MG/1; MG/1; MG/1; MG/1
30 TABLET ORAL DAILY
Qty: 30 TABLET | Refills: 0 | Status: SHIPPED | OUTPATIENT
Start: 2022-10-04 | End: 2022-10-27 | Stop reason: SDUPTHER

## 2022-10-17 ENCOUNTER — OFFICE VISIT (OUTPATIENT)
Dept: FAMILY MEDICINE CLINIC | Age: 52
End: 2022-10-17
Payer: COMMERCIAL

## 2022-10-17 VITALS
DIASTOLIC BLOOD PRESSURE: 77 MMHG | HEART RATE: 78 BPM | HEIGHT: 68 IN | TEMPERATURE: 97.6 F | WEIGHT: 199 LBS | SYSTOLIC BLOOD PRESSURE: 146 MMHG | OXYGEN SATURATION: 98 % | BODY MASS INDEX: 30.16 KG/M2

## 2022-10-17 DIAGNOSIS — F90.0 ATTENTION DEFICIT HYPERACTIVITY DISORDER (ADHD), PREDOMINANTLY INATTENTIVE TYPE: ICD-10-CM

## 2022-10-17 DIAGNOSIS — J20.9 ACUTE BRONCHITIS, UNSPECIFIED ORGANISM: Primary | ICD-10-CM

## 2022-10-17 PROCEDURE — 99213 OFFICE O/P EST LOW 20 MIN: CPT | Performed by: INTERNAL MEDICINE

## 2022-10-17 PROCEDURE — 3017F COLORECTAL CA SCREEN DOC REV: CPT | Performed by: INTERNAL MEDICINE

## 2022-10-17 PROCEDURE — G8427 DOCREV CUR MEDS BY ELIG CLIN: HCPCS | Performed by: INTERNAL MEDICINE

## 2022-10-17 PROCEDURE — 4004F PT TOBACCO SCREEN RCVD TLK: CPT | Performed by: INTERNAL MEDICINE

## 2022-10-17 PROCEDURE — G8484 FLU IMMUNIZE NO ADMIN: HCPCS | Performed by: INTERNAL MEDICINE

## 2022-10-17 PROCEDURE — G8417 CALC BMI ABV UP PARAM F/U: HCPCS | Performed by: INTERNAL MEDICINE

## 2022-10-17 RX ORDER — AZITHROMYCIN 250 MG/1
250 TABLET, FILM COATED ORAL SEE ADMIN INSTRUCTIONS
Qty: 6 TABLET | Refills: 0 | Status: SHIPPED | OUTPATIENT
Start: 2022-10-17 | End: 2022-10-22

## 2022-10-17 RX ORDER — GUAIFENESIN AND CODEINE PHOSPHATE 100; 10 MG/5ML; MG/5ML
5 SOLUTION ORAL 4 TIMES DAILY PRN
Qty: 118 ML | Refills: 0 | Status: SHIPPED | OUTPATIENT
Start: 2022-10-17 | End: 2022-10-24

## 2022-10-17 NOTE — PROGRESS NOTES
SUBJECTIVE:  Ruy Mejia is a 45 y.o. male being evaluated for:    Chief Complaint   Patient presents with    Joint Pain      Patient is having joint pain . Cough      Patient is having cough and running nose . HPI   Sick since Wednesday night  Started as a sore throat  then body aches  Then cough into his chest  Wheezing  at night   No sob Not sleeping as coughing all night  stomach sore from coughing  Coughing up yellow phlegm. No nausea vomiting or diarrhea   Head congestion and blowing nose like crazy  Nasal drainage is clear  Eyes watering  NO headaches  No ear pain  Feverish Night sweats   Taking advil cold and sinus   Allergies   Allergen Reactions    Lisinopril Other (See Comments)     Cough     Current Outpatient Medications   Medication Sig Dispense Refill    azithromycin (ZITHROMAX) 250 MG tablet Take 1 tablet by mouth See Admin Instructions for 5 days 500mg on day 1 followed by 250mg on days 2 - 5 6 tablet 0    guaiFENesin-codeine (TUSSI-ORGANIDIN NR) 100-10 MG/5ML syrup Take 5 mLs by mouth 4 times daily as needed for Cough for up to 7 days. 118 mL 0    amphetamine-dextroamphetamine (ADDERALL, 30MG,) 30 MG tablet Take 1 tablet by mouth daily for 30 days. 30 tablet 0    montelukast (SINGULAIR) 10 MG tablet TAKE 1 TABLET BY MOUTH EVERY DAY 30 tablet 3    loratadine (CLARITIN) 10 MG tablet Take 1 tablet by mouth daily 90 tablet 1    fluticasone (FLONASE) 50 MCG/ACT nasal spray SPRAY 2 SPRAYS INTO EACH NOSTRIL EVERY DAY 16 g 3    naproxen sodium (ALEVE) 220 MG tablet Take 2.5 tablets by mouth 2 times daily (with meals) (Patient taking differently: Take 550 mg by mouth as needed) 40 tablet 0    Omega-3 Fatty Acids (FISH OIL) 1000 MG CAPS Take 2,000 mg by mouth       No current facility-administered medications for this visit.          Social History     Socioeconomic History    Marital status:      Spouse name: Not on file    Number of children: Not on file    Years of education: Not on file    Highest education level: Not on file   Occupational History    Not on file   Tobacco Use    Smoking status: Every Day     Packs/day: 0.50     Years: 35.00     Pack years: 17.50     Types: Cigarettes     Start date: 1/1/1988    Smokeless tobacco: Never   Vaping Use    Vaping Use: Never used   Substance and Sexual Activity    Alcohol use: Yes     Comment: occassional    Drug use: Never    Sexual activity: Yes     Partners: Female   Other Topics Concern    Not on file   Social History Narrative    Not on file     Social Determinants of Health     Financial Resource Strain: Low Risk     Difficulty of Paying Living Expenses: Not hard at all   Food Insecurity: No Food Insecurity    Worried About Running Out of Food in the Last Year: Never true    Ran Out of Food in the Last Year: Never true   Transportation Needs: No Transportation Needs    Lack of Transportation (Medical): No    Lack of Transportation (Non-Medical): No   Physical Activity: Not on file   Stress: Not on file   Social Connections: Not on file   Intimate Partner Violence: Not on file   Housing Stability: Not on file      Past Medical History:   Diagnosis Date    ADD (attention deficit disorder)     Depression     Hypertension      Past Surgical History:   Procedure Laterality Date    FRACTURE SURGERY      right wrist    WISDOM TOOTH EXTRACTION         Review of Systems   Constitutional:  Positive for diaphoresis, fatigue and fever. HENT:  Positive for congestion, postnasal drip, rhinorrhea and sore throat. Negative for ear pain. Eyes:  Positive for discharge. Respiratory:  Positive for cough and wheezing. Negative for shortness of breath. Cardiovascular:  Negative for chest pain and palpitations. Gastrointestinal:  Positive for abdominal pain. Negative for diarrhea, nausea and vomiting. Musculoskeletal:  Positive for myalgias. Neurological:  Negative for dizziness, light-headedness and headaches.    Psychiatric/Behavioral:  Positive for decreased concentration and sleep disturbance. Negative for hallucinations. OBJECTIVE:  BP (!) 146/77 (Site: Left Upper Arm, Position: Sitting, Cuff Size: Medium Adult)   Pulse 78   Temp 97.6 °F (36.4 °C) (Oral)   Ht 5' 8\" (1.727 m)   Wt 199 lb (90.3 kg)   SpO2 98%   BMI 30.26 kg/m²      Body mass index is 30.26 kg/m². Physical Exam  Vitals and nursing note reviewed. Constitutional:       General: He is not in acute distress. Appearance: Normal appearance. He is well-developed. HENT:      Head: Normocephalic and atraumatic. Right Ear: Tympanic membrane and ear canal normal.      Left Ear: Tympanic membrane and ear canal normal.      Ears:      Comments: No sinus tenderness      Nose: Congestion present. Mouth/Throat:      Mouth: Mucous membranes are dry. Pharynx: Oropharynx is clear. Eyes:      Conjunctiva/sclera: Conjunctivae normal.   Neck:      Thyroid: No thyromegaly. Cardiovascular:      Rate and Rhythm: Normal rate and regular rhythm. Heart sounds: Normal heart sounds. No murmur heard. No friction rub. No gallop. Pulmonary:      Effort: Pulmonary effort is normal.      Breath sounds: Wheezing present. Chest:      Chest wall: No tenderness. Abdominal:      General: There is no distension. Palpations: Abdomen is soft. Tenderness: There is no abdominal tenderness. Comments: No HSM   Musculoskeletal:         General: No swelling. Cervical back: Neck supple. Lymphadenopathy:      Cervical: No cervical adenopathy. Skin:     General: Skin is warm and dry. Neurological:      General: No focal deficit present. Mental Status: He is alert. Gait: Gait normal.   Psychiatric:         Mood and Affect: Mood normal.         Behavior: Behavior normal.         Thought Content: Thought content normal.       ASSESSMENT/PLAN:    Salomon Herman was seen today for joint pain and cough.     Diagnoses and all orders for this visit:    Acute bronchitis, unspecified organism  -     azithromycin (ZITHROMAX) 250 MG tablet; Take 1 tablet by mouth See Admin Instructions for 5 days 500mg on day 1 followed by 250mg on days 2 - 5  -     guaiFENesin-codeine (TUSSI-ORGANIDIN NR) 100-10 MG/5ML syrup; Take 5 mLs by mouth 4 times daily as needed for Cough for up to 7 days. Attention deficit hyperactivity disorder (ADHD), predominantly inattentive type  Reviewed oarrs     Orders Placed This Encounter   Medications    azithromycin (ZITHROMAX) 250 MG tablet     Sig: Take 1 tablet by mouth See Admin Instructions for 5 days 500mg on day 1 followed by 250mg on days 2 - 5     Dispense:  6 tablet     Refill:  0    guaiFENesin-codeine (TUSSI-ORGANIDIN NR) 100-10 MG/5ML syrup     Sig: Take 5 mLs by mouth 4 times daily as needed for Cough for up to 7 days. Dispense:  118 mL     Refill:  0     Reduce doses taken as pain becomes manageable        Return in about 3 months (around 1/17/2023), or if symptoms worsen or fail to improve, for HYPERTENSION AND ADD CHECK UP . There are no Patient Instructions on file for this visit.

## 2022-10-21 ASSESSMENT — ENCOUNTER SYMPTOMS
NAUSEA: 0
SORE THROAT: 1
VOMITING: 0
EYE DISCHARGE: 1
SHORTNESS OF BREATH: 0
RHINORRHEA: 1
WHEEZING: 1
COUGH: 1
ABDOMINAL PAIN: 1
DIARRHEA: 0

## 2022-10-27 DIAGNOSIS — F90.0 ATTENTION DEFICIT HYPERACTIVITY DISORDER (ADHD), PREDOMINANTLY INATTENTIVE TYPE: ICD-10-CM

## 2022-10-27 RX ORDER — DEXTROAMPHETAMINE SACCHARATE, AMPHETAMINE ASPARTATE, DEXTROAMPHETAMINE SULFATE AND AMPHETAMINE SULFATE 7.5; 7.5; 7.5; 7.5 MG/1; MG/1; MG/1; MG/1
30 TABLET ORAL DAILY
Qty: 30 TABLET | Refills: 0 | Status: SHIPPED | OUTPATIENT
Start: 2022-11-02 | End: 2022-11-29 | Stop reason: SDUPTHER

## 2022-10-27 NOTE — TELEPHONE ENCOUNTER
----- Message from Ramandeep Johansen sent at 10/27/2022 11:13 AM EDT -----  Subject: Refill Request    QUESTIONS  Name of Medication? amphetamine-dextroamphetamine (ADDERALL, 30MG,) 30 MG   tablet  Patient-reported dosage and instructions? 30mg, 1 daily   How many days do you have left? 5  Preferred Pharmacy? [id number]  Pharmacy phone number (if available)? [phone]  ---------------------------------------------------------------------------  --------------  Aretta Kill INFO  What is the best way for the office to contact you? OK to leave message on   voicemail  Preferred Call Back Phone Number? [phone]  ---------------------------------------------------------------------------  --------------  SCRIPT ANSWERS  Relationship to Patient?  Self

## 2022-11-29 ENCOUNTER — TELEPHONE (OUTPATIENT)
Dept: FAMILY MEDICINE CLINIC | Age: 52
End: 2022-11-29

## 2022-11-29 DIAGNOSIS — F90.0 ATTENTION DEFICIT HYPERACTIVITY DISORDER (ADHD), PREDOMINANTLY INATTENTIVE TYPE: ICD-10-CM

## 2022-11-29 RX ORDER — DEXTROAMPHETAMINE SACCHARATE, AMPHETAMINE ASPARTATE, DEXTROAMPHETAMINE SULFATE AND AMPHETAMINE SULFATE 7.5; 7.5; 7.5; 7.5 MG/1; MG/1; MG/1; MG/1
30 TABLET ORAL DAILY
Qty: 30 TABLET | Refills: 0 | Status: SHIPPED | OUTPATIENT
Start: 2022-11-30 | End: 2022-12-30

## 2022-11-29 NOTE — TELEPHONE ENCOUNTER
Last OV: 10/17/2022  Next OV: 12/6/2022         ----- Message from Sandra Rosado sent at 11/29/2022  4:04 PM EST -----  Subject: Refill Request    QUESTIONS  Name of Medication? amphetamine-dextroamphetamine (ADDERALL, 30MG,) 30 MG   tablet  Patient-reported dosage and instructions? 30 mg tablet once daily   How many days do you have left? 2  Preferred [facility]? [id number]  Pharmacy phone number (if available)? [phone]  ---------------------------------------------------------------------------  --------------  Lum Chance INFO  What is the best way for the office to contact you? OK to leave message on   voicemail  Preferred Call Back Phone Number? [phone]  ---------------------------------------------------------------------------  --------------  SCRIPT ANSWERS  Relationship to Patient?  Self

## 2022-12-06 ENCOUNTER — OFFICE VISIT (OUTPATIENT)
Dept: FAMILY MEDICINE CLINIC | Age: 52
End: 2022-12-06
Payer: COMMERCIAL

## 2022-12-06 VITALS
SYSTOLIC BLOOD PRESSURE: 139 MMHG | DIASTOLIC BLOOD PRESSURE: 84 MMHG | OXYGEN SATURATION: 98 % | TEMPERATURE: 98.2 F | WEIGHT: 196 LBS | HEIGHT: 68 IN | HEART RATE: 63 BPM | BODY MASS INDEX: 29.7 KG/M2

## 2022-12-06 DIAGNOSIS — R31.21 ASYMPTOMATIC MICROSCOPIC HEMATURIA: ICD-10-CM

## 2022-12-06 DIAGNOSIS — Z12.5 PROSTATE CANCER SCREENING: ICD-10-CM

## 2022-12-06 DIAGNOSIS — F90.0 ATTENTION DEFICIT HYPERACTIVITY DISORDER (ADHD), PREDOMINANTLY INATTENTIVE TYPE: ICD-10-CM

## 2022-12-06 DIAGNOSIS — R39.15 URINARY URGENCY: Primary | ICD-10-CM

## 2022-12-06 LAB
BASOPHILS ABSOLUTE: 0 K/UL (ref 0–0.2)
BASOPHILS RELATIVE PERCENT: 0.5 %
BILIRUBIN, POC: NEGATIVE
BLOOD URINE, POC: NORMAL
CLARITY, POC: CLEAR
COLOR, POC: YELLOW
EOSINOPHILS ABSOLUTE: 0.1 K/UL (ref 0–0.6)
EOSINOPHILS RELATIVE PERCENT: 1 %
GLUCOSE URINE, POC: NEGATIVE
HCT VFR BLD CALC: 44.5 % (ref 40.5–52.5)
HEMOGLOBIN: 15 G/DL (ref 13.5–17.5)
KETONES, POC: NEGATIVE
LEUKOCYTE EST, POC: NEGATIVE
LYMPHOCYTES ABSOLUTE: 2.1 K/UL (ref 1–5.1)
LYMPHOCYTES RELATIVE PERCENT: 22.1 %
MCH RBC QN AUTO: 32 PG (ref 26–34)
MCHC RBC AUTO-ENTMCNC: 33.6 G/DL (ref 31–36)
MCV RBC AUTO: 95 FL (ref 80–100)
MONOCYTES ABSOLUTE: 0.7 K/UL (ref 0–1.3)
MONOCYTES RELATIVE PERCENT: 7.9 %
NEUTROPHILS ABSOLUTE: 6.5 K/UL (ref 1.7–7.7)
NEUTROPHILS RELATIVE PERCENT: 68.5 %
NITRITE, POC: NEGATIVE
PDW BLD-RTO: 14.3 % (ref 12.4–15.4)
PH, POC: 6
PLATELET # BLD: 331 K/UL (ref 135–450)
PMV BLD AUTO: 7.8 FL (ref 5–10.5)
PROTEIN, POC: NEGATIVE
RBC # BLD: 4.69 M/UL (ref 4.2–5.9)
SPECIFIC GRAVITY, POC: >1.03
UROBILINOGEN, POC: NORMAL
WBC # BLD: 9.4 K/UL (ref 4–11)

## 2022-12-06 PROCEDURE — 99213 OFFICE O/P EST LOW 20 MIN: CPT | Performed by: INTERNAL MEDICINE

## 2022-12-06 PROCEDURE — G8482 FLU IMMUNIZE ORDER/ADMIN: HCPCS | Performed by: INTERNAL MEDICINE

## 2022-12-06 PROCEDURE — 3017F COLORECTAL CA SCREEN DOC REV: CPT | Performed by: INTERNAL MEDICINE

## 2022-12-06 PROCEDURE — 81002 URINALYSIS NONAUTO W/O SCOPE: CPT | Performed by: INTERNAL MEDICINE

## 2022-12-06 PROCEDURE — G8427 DOCREV CUR MEDS BY ELIG CLIN: HCPCS | Performed by: INTERNAL MEDICINE

## 2022-12-06 PROCEDURE — 4004F PT TOBACCO SCREEN RCVD TLK: CPT | Performed by: INTERNAL MEDICINE

## 2022-12-06 PROCEDURE — 90674 CCIIV4 VAC NO PRSV 0.5 ML IM: CPT | Performed by: INTERNAL MEDICINE

## 2022-12-06 PROCEDURE — 90471 IMMUNIZATION ADMIN: CPT | Performed by: INTERNAL MEDICINE

## 2022-12-06 PROCEDURE — G8417 CALC BMI ABV UP PARAM F/U: HCPCS | Performed by: INTERNAL MEDICINE

## 2022-12-06 NOTE — PROGRESS NOTES
SUBJECTIVE:  Jaren Butts is a 45 y.o. male being evaluated for:    Chief Complaint   Patient presents with    Urinary Tract Infection      Patient is having urgency  and having lower back pain . HPI   Has always had urinary frequency  For the last 3 to 4 months  significant urgency  Has had accidents due to this      Pain aross his low back      Allergies   Allergen Reactions    Lisinopril Other (See Comments)     Cough     Current Outpatient Medications   Medication Sig Dispense Refill    amphetamine-dextroamphetamine (ADDERALL, 30MG,) 30 MG tablet Take 1 tablet by mouth daily for 30 days. 30 tablet 0    montelukast (SINGULAIR) 10 MG tablet TAKE 1 TABLET BY MOUTH EVERY DAY 30 tablet 3    loratadine (CLARITIN) 10 MG tablet Take 1 tablet by mouth daily 90 tablet 1    fluticasone (FLONASE) 50 MCG/ACT nasal spray SPRAY 2 SPRAYS INTO EACH NOSTRIL EVERY DAY 16 g 3    naproxen sodium (ALEVE) 220 MG tablet Take 2.5 tablets by mouth 2 times daily (with meals) (Patient taking differently: Take 550 mg by mouth as needed) 40 tablet 0    Omega-3 Fatty Acids (FISH OIL) 1000 MG CAPS Take 2,000 mg by mouth       No current facility-administered medications for this visit.          Social History     Socioeconomic History    Marital status:      Spouse name: Not on file    Number of children: Not on file    Years of education: Not on file    Highest education level: Not on file   Occupational History    Not on file   Tobacco Use    Smoking status: Every Day     Packs/day: 0.50     Years: 35.00     Pack years: 17.50     Types: Cigarettes     Start date: 1/1/1988    Smokeless tobacco: Never   Vaping Use    Vaping Use: Never used   Substance and Sexual Activity    Alcohol use: Yes     Comment: occassional    Drug use: Never    Sexual activity: Yes     Partners: Female   Other Topics Concern    Not on file   Social History Narrative    Not on file     Social Determinants of Health     Financial Resource Strain: Low Risk     Difficulty of Paying Living Expenses: Not hard at all   Food Insecurity: No Food Insecurity    Worried About [address] [employer] in the Last Year: Never true    Ran Out of Food in the Last Year: Never true   Transportation Needs: No Transportation Needs    Lack of Transportation (Medical): No    Lack of Transportation (Non-Medical): No   Physical Activity: Not on file   Stress: Not on file   Social Connections: Not on file   Intimate Partner Violence: Not on file   Housing Stability: Not on file      Past Medical History:   Diagnosis Date    ADD (attention deficit disorder)     Depression     Hypertension      Past Surgical History:   Procedure Laterality Date    FRACTURE SURGERY      right wrist    WISDOM TOOTH EXTRACTION         Review of Systems   Constitutional:  Negative for activity change, fatigue and unexpected weight change. HENT:  Negative for nosebleeds. Respiratory:  Negative for cough and shortness of breath. Cardiovascular:  Negative for chest pain, palpitations and leg swelling. Gastrointestinal:  Negative for abdominal pain, blood in stool, diarrhea, nausea and vomiting. Genitourinary:  Positive for frequency, hematuria and urgency. Negative for flank pain. Musculoskeletal:  Positive for back pain. Neurological:  Negative for dizziness, light-headedness and headaches. Psychiatric/Behavioral:  Positive for decreased concentration (doing well with medication ). Negative for agitation, behavioral problems, dysphoric mood, hallucinations, sleep disturbance and suicidal ideas. The patient is not nervous/anxious and is not hyperactive. OBJECTIVE:  /84 (Site: Left Upper Arm, Position: Sitting, Cuff Size: Medium Adult)   Pulse 63   Temp 98.2 °F (36.8 °C) (Oral)   Ht 5' 8\" (1.727 m)   Wt 196 lb (88.9 kg)   SpO2 98%   BMI 29.80 kg/m²      Body mass index is 29.8 kg/m². Physical Exam  Vitals and nursing note reviewed.    Constitutional:       General: He is not in acute distress. Appearance: Normal appearance. He is well-developed. HENT:      Head: Normocephalic and atraumatic. Eyes:      Conjunctiva/sclera: Conjunctivae normal.   Neck:      Thyroid: No thyromegaly. Cardiovascular:      Rate and Rhythm: Normal rate and regular rhythm. Heart sounds: Normal heart sounds. No murmur heard. No friction rub. No gallop. Pulmonary:      Effort: Pulmonary effort is normal.      Breath sounds: Normal breath sounds. Chest:      Chest wall: No tenderness. Abdominal:      General: There is no distension. Palpations: Abdomen is soft. Tenderness: There is no abdominal tenderness. There is no right CVA tenderness or left CVA tenderness. Comments: No HSM   Musculoskeletal:         General: No swelling. Cervical back: Neck supple. Lymphadenopathy:      Cervical: No cervical adenopathy. Skin:     General: Skin is warm and dry. Neurological:      General: No focal deficit present. Mental Status: He is alert. Gait: Gait normal.   Psychiatric:         Mood and Affect: Mood normal.         Behavior: Behavior normal.         Thought Content: Thought content normal.       ASSESSMENT/PLAN:    Radha Garcia was seen today for urinary tract infection. Diagnoses and all orders for this visit:    Urinary urgency  -     JURGEN - Bryce Zavaleta MD, Urology, [facility]  -     POCT Urinalysis no Micro    Asymptomatic microscopic hematuria  -     CBC with Auto Differential; Future  -     Basic Metabolic Panel; Future  -     JURGEN - Bryce Zavaleta MD, Urology, [facility]    Prostate cancer screening  -     PSA Screening; Future    Attention deficit hyperactivity disorder (ADHD), predominantly inattentive type   Reviewed oarrs on chronic adderall    Vaccine   -     Influenza, FLUCELVAX, (age 10 mo+), IM, Preservative Free, 0.5 mL      No orders of the defined types were placed in this encounter.        Return in about 3 months (around 3/6/2023), or if symptoms worsen or fail to improve. There are no Patient Instructions on file for this visit.

## 2022-12-07 LAB
ANION GAP SERPL CALCULATED.3IONS-SCNC: 15 MMOL/L (ref 3–16)
BUN BLDV-MCNC: 13 MG/DL (ref 7–20)
CALCIUM SERPL-MCNC: 9.6 MG/DL (ref 8.3–10.6)
CHLORIDE BLD-SCNC: 104 MMOL/L (ref 99–110)
CO2: 21 MMOL/L (ref 21–32)
CREAT SERPL-MCNC: 0.8 MG/DL (ref 0.9–1.3)
GFR SERPL CREATININE-BSD FRML MDRD: >60 ML/MIN/{1.73_M2}
GLUCOSE BLD-MCNC: 109 MG/DL (ref 70–99)
POTASSIUM SERPL-SCNC: 4.5 MMOL/L (ref 3.5–5.1)
PROSTATE SPECIFIC ANTIGEN: 0.95 NG/ML (ref 0–4)
SODIUM BLD-SCNC: 140 MMOL/L (ref 136–145)

## 2022-12-10 ASSESSMENT — ENCOUNTER SYMPTOMS
ABDOMINAL PAIN: 0
COUGH: 0
BACK PAIN: 1
VOMITING: 0
NAUSEA: 0
SHORTNESS OF BREATH: 0
DIARRHEA: 0
BLOOD IN STOOL: 0

## 2022-12-15 RX ORDER — MONTELUKAST SODIUM 10 MG/1
TABLET ORAL
Qty: 90 TABLET | Refills: 1 | Status: SHIPPED | OUTPATIENT
Start: 2022-12-15

## 2022-12-22 ENCOUNTER — TELEPHONE (OUTPATIENT)
Dept: FAMILY MEDICINE CLINIC | Age: 52
End: 2022-12-22

## 2022-12-22 NOTE — TELEPHONE ENCOUNTER
Pt has requested a refill on his Adderall. This medication was last filled on 11/30/22 and is too soon to be filled until 12/30/22.

## 2022-12-22 NOTE — TELEPHONE ENCOUNTER
----- Message from Amada Samaniego sent at 12/22/2022 11:29 AM EST -----  Subject: Refill Request    QUESTIONS  Name of Medication? amphetamine-dextroamphetamine (ADDERALL, 30MG,) 30 MG   tablet  Patient-reported dosage and instructions? Take 1 tablet by mouth daily for   30 days. How many days do you have left? 7  [facility]erred [facility]? [id number]  Pharmacy phone number (if available)? [phone]  ---------------------------------------------------------------------------  --------------  Bob BELCHER  What is the best way for the office to contact you? OK to leave message on   voicemail  Preferred Call Back Phone Number? [phone]  ---------------------------------------------------------------------------  --------------  SCRIPT ANSWERS  Relationship to Patient?  Self

## 2022-12-29 DIAGNOSIS — F90.0 ATTENTION DEFICIT HYPERACTIVITY DISORDER (ADHD), PREDOMINANTLY INATTENTIVE TYPE: ICD-10-CM

## 2022-12-29 RX ORDER — DEXTROAMPHETAMINE SACCHARATE, AMPHETAMINE ASPARTATE, DEXTROAMPHETAMINE SULFATE AND AMPHETAMINE SULFATE 7.5; 7.5; 7.5; 7.5 MG/1; MG/1; MG/1; MG/1
30 TABLET ORAL DAILY
Qty: 30 TABLET | Refills: 0 | Status: SHIPPED | OUTPATIENT
Start: 2022-12-29 | End: 2023-01-28

## 2022-12-30 RX ORDER — LORATADINE 10 MG/1
TABLET ORAL
Qty: 30 TABLET | Refills: 5 | Status: SHIPPED | OUTPATIENT
Start: 2022-12-30

## 2023-01-25 DIAGNOSIS — F90.0 ATTENTION DEFICIT HYPERACTIVITY DISORDER (ADHD), PREDOMINANTLY INATTENTIVE TYPE: ICD-10-CM

## 2023-01-25 NOTE — TELEPHONE ENCOUNTER
----- Message from Farideh Carter sent at 1/25/2023 11:03 AM EST -----  Subject: Refill Request    QUESTIONS  Name of Medication? amphetamine-dextroamphetamine (ADDERALL, 30MG,) 30 MG   tablet  Patient-reported dosage and instructions? Take 1 tablet by mouth daily for   30 days  How many days do you have left? 0  Preferred [facility]? [id number]  Pharmacy phone number (if available)? [phone]  ---------------------------------------------------------------------------  --------------  Penelope Rodriguez INFO  What is the best way for the office to contact you? OK to leave message on   voicemail  Preferred Call Back Phone Number? [phone]  ---------------------------------------------------------------------------  --------------  SCRIPT ANSWERS  Relationship to Patient?  Self

## 2023-01-26 RX ORDER — DEXTROAMPHETAMINE SACCHARATE, AMPHETAMINE ASPARTATE, DEXTROAMPHETAMINE SULFATE AND AMPHETAMINE SULFATE 7.5; 7.5; 7.5; 7.5 MG/1; MG/1; MG/1; MG/1
30 TABLET ORAL DAILY
Qty: 30 TABLET | Refills: 0 | Status: SHIPPED | OUTPATIENT
Start: 2023-01-26 | End: 2023-02-25

## 2023-02-23 ENCOUNTER — TELEPHONE (OUTPATIENT)
Dept: FAMILY MEDICINE CLINIC | Age: 53
End: 2023-02-23

## 2023-02-23 DIAGNOSIS — F90.0 ATTENTION DEFICIT HYPERACTIVITY DISORDER (ADHD), PREDOMINANTLY INATTENTIVE TYPE: ICD-10-CM

## 2023-02-23 NOTE — TELEPHONE ENCOUNTER
----- Message from Navarro Mcintosh sent at 2/23/2023  8:36 AM EST -----  Subject: Refill Request    QUESTIONS  Name of Medication? amphetamine-dextroamphetamine (ADDERALL, 30MG,) 30 MG   tablet  Patient-reported dosage and instructions? once a day  How many days do you have left? 2  Preferred [facility]? [id number]  Pharmacy phone number (if available)? [phone]  ---------------------------------------------------------------------------  --------------  Selena BELCHER  What is the best way for the office to contact you? OK to leave message on   voicemail  Preferred Call Back Phone Number? [phone]  ---------------------------------------------------------------------------  --------------  SCRIPT ANSWERS  Relationship to Patient?  Self

## 2023-02-24 RX ORDER — DEXTROAMPHETAMINE SACCHARATE, AMPHETAMINE ASPARTATE, DEXTROAMPHETAMINE SULFATE AND AMPHETAMINE SULFATE 7.5; 7.5; 7.5; 7.5 MG/1; MG/1; MG/1; MG/1
30 TABLET ORAL DAILY
Qty: 30 TABLET | Refills: 0 | Status: SHIPPED | OUTPATIENT
Start: 2023-02-24 | End: 2023-03-26

## 2023-02-24 NOTE — TELEPHONE ENCOUNTER
Sorry but  my dakota to send prescriptions was not working so I had to reload and recredential  , it as sent this am

## 2023-03-07 ENCOUNTER — OFFICE VISIT (OUTPATIENT)
Dept: FAMILY MEDICINE CLINIC | Age: 53
End: 2023-03-07

## 2023-03-07 VITALS
SYSTOLIC BLOOD PRESSURE: 127 MMHG | BODY MASS INDEX: 29.86 KG/M2 | OXYGEN SATURATION: 95 % | TEMPERATURE: 98 F | HEIGHT: 68 IN | HEART RATE: 63 BPM | DIASTOLIC BLOOD PRESSURE: 82 MMHG | WEIGHT: 197 LBS

## 2023-03-07 DIAGNOSIS — D17.1 LIPOMA OF TORSO: ICD-10-CM

## 2023-03-07 DIAGNOSIS — R31.21 ASYMPTOMATIC MICROSCOPIC HEMATURIA: ICD-10-CM

## 2023-03-07 DIAGNOSIS — F43.21 GRIEF: ICD-10-CM

## 2023-03-07 DIAGNOSIS — F90.0 ATTENTION DEFICIT HYPERACTIVITY DISORDER (ADHD), PREDOMINANTLY INATTENTIVE TYPE: ICD-10-CM

## 2023-03-07 DIAGNOSIS — M71.21 SYNOVIAL CYST OF RIGHT POPLITEAL SPACE: Primary | ICD-10-CM

## 2023-03-07 RX ORDER — MIRTAZAPINE 15 MG/1
15 TABLET, FILM COATED ORAL NIGHTLY
Qty: 30 TABLET | Refills: 5 | Status: SHIPPED | OUTPATIENT
Start: 2023-03-07

## 2023-03-07 RX ORDER — NAPROXEN 500 MG/1
500 TABLET ORAL 2 TIMES DAILY WITH MEALS
Qty: 30 TABLET | Refills: 0 | Status: SHIPPED | OUTPATIENT
Start: 2023-03-07

## 2023-03-07 SDOH — ECONOMIC STABILITY: FOOD INSECURITY: WITHIN THE PAST 12 MONTHS, THE FOOD YOU BOUGHT JUST DIDN'T LAST AND YOU DIDN'T HAVE MONEY TO GET MORE.: NEVER TRUE

## 2023-03-07 SDOH — ECONOMIC STABILITY: FOOD INSECURITY: WITHIN THE PAST 12 MONTHS, YOU WORRIED THAT YOUR FOOD WOULD RUN OUT BEFORE YOU GOT MONEY TO BUY MORE.: NEVER TRUE

## 2023-03-07 SDOH — ECONOMIC STABILITY: HOUSING INSECURITY
IN THE LAST 12 MONTHS, WAS THERE A TIME WHEN YOU DID NOT HAVE A STEADY PLACE TO SLEEP OR SLEPT IN A SHELTER (INCLUDING NOW)?: NO

## 2023-03-07 SDOH — ECONOMIC STABILITY: INCOME INSECURITY: HOW HARD IS IT FOR YOU TO PAY FOR THE VERY BASICS LIKE FOOD, HOUSING, MEDICAL CARE, AND HEATING?: NOT HARD AT ALL

## 2023-03-07 ASSESSMENT — PATIENT HEALTH QUESTIONNAIRE - PHQ9
SUM OF ALL RESPONSES TO PHQ QUESTIONS 1-9: 1
6. FEELING BAD ABOUT YOURSELF - OR THAT YOU ARE A FAILURE OR HAVE LET YOURSELF OR YOUR FAMILY DOWN: 0
9. THOUGHTS THAT YOU WOULD BE BETTER OFF DEAD, OR OF HURTING YOURSELF: 0
8. MOVING OR SPEAKING SO SLOWLY THAT OTHER PEOPLE COULD HAVE NOTICED. OR THE OPPOSITE, BEING SO FIGETY OR RESTLESS THAT YOU HAVE BEEN MOVING AROUND A LOT MORE THAN USUAL: 0
5. POOR APPETITE OR OVEREATING: 0
SUM OF ALL RESPONSES TO PHQ QUESTIONS 1-9: 1
SUM OF ALL RESPONSES TO PHQ QUESTIONS 1-9: 1
4. FEELING TIRED OR HAVING LITTLE ENERGY: 0
SUM OF ALL RESPONSES TO PHQ QUESTIONS 1-9: 1
3. TROUBLE FALLING OR STAYING ASLEEP: 0
7. TROUBLE CONCENTRATING ON THINGS, SUCH AS READING THE NEWSPAPER OR WATCHING TELEVISION: 0
10. IF YOU CHECKED OFF ANY PROBLEMS, HOW DIFFICULT HAVE THESE PROBLEMS MADE IT FOR YOU TO DO YOUR WORK, TAKE CARE OF THINGS AT HOME, OR GET ALONG WITH OTHER PEOPLE: 0
2. FEELING DOWN, DEPRESSED OR HOPELESS: 1

## 2023-03-07 NOTE — PROGRESS NOTES
SUBJECTIVE:  Bert Talavera is a 45 y.o. male being evaluated for:    Chief Complaint   Patient presents with    3 Month Follow-Up      Patient is here for 3 months followup today . HPI   Getting first dentures  Getting impressions today     ADD is doing well      Within the last month or 2 knot  egg sized behind his right knee  NO pain  no trouble  No activity changes  Knot on back of left sided below scapular  Not getting bigger and there for 15 years     Trouble sleeping  Father just passed away last month      Allergies   Allergen Reactions    Lisinopril Other (See Comments)     Cough     Current Outpatient Medications   Medication Sig Dispense Refill    mirtazapine (REMERON) 15 MG tablet Take 1 tablet by mouth nightly 30 tablet 5    naproxen (NAPROSYN) 500 MG tablet Take 1 tablet by mouth 2 times daily (with meals) 30 tablet 0    amphetamine-dextroamphetamine (ADDERALL, 30MG,) 30 MG tablet Take 1 tablet by mouth daily for 30 days. 30 tablet 0    loratadine (CLARITIN) 10 MG tablet TAKE 1 TABLET BY MOUTH EVERY DAY 30 tablet 5    montelukast (SINGULAIR) 10 MG tablet TAKE 1 TABLET BY MOUTH EVERY DAY 90 tablet 1    fluticasone (FLONASE) 50 MCG/ACT nasal spray SPRAY 2 SPRAYS INTO EACH NOSTRIL EVERY DAY 16 g 3     No current facility-administered medications for this visit.          Social History     Socioeconomic History    Marital status:      Spouse name: Not on file    Number of children: Not on file    Years of education: Not on file    Highest education level: Not on file   Occupational History    Not on file   Tobacco Use    Smoking status: Every Day     Packs/day: 0.50     Years: 35.00     Pack years: 17.50     Types: Cigarettes     Start date: 1/1/1988    Smokeless tobacco: Never   Vaping Use    Vaping Use: Never used   Substance and Sexual Activity    Alcohol use: Yes     Comment: occassional    Drug use: Never    Sexual activity: Yes     Partners: Female   Other Topics Concern    Not on file Social History Narrative    Not on file     Social Determinants of Health     Financial Resource Strain: Low Risk     Difficulty of Paying Living Expenses: Not hard at all   Food Insecurity: No Food Insecurity    Worried About [address] in the Last Year: Never true    [address] [facility] [address] in the Last Year: Never true   Transportation Needs: No Transportation Needs    Lack of Transportation (Medical): No    Lack of Transportation (Non-Medical): No   Physical Activity: Not on file   Stress: Not on file   Social Connections: Not on file   Intimate Partner Violence: Not on file   Housing Stability: Unknown    Unable to Pay for Housing in the Last Year: Not on file    Number of Places Lived in the Last Year: Not on file    Unstable Housing in the Last Year: No      Past Medical History:   Diagnosis Date    ADD (attention deficit disorder)     Depression     Hypertension      Past Surgical History:   Procedure Laterality Date    FRACTURE SURGERY      right wrist    WISDOM TOOTH EXTRACTION         Review of Systems   Constitutional:  Negative for activity change, fatigue and unexpected weight change. HENT:  Positive for dental problem. Respiratory:  Negative for cough and shortness of breath. Cardiovascular:  Negative for chest pain, palpitations and leg swelling. Gastrointestinal:  Negative for abdominal pain, diarrhea, nausea and vomiting. Musculoskeletal:  Positive for joint swelling. Negative for arthralgias and gait problem. Neurological:  Negative for dizziness, light-headedness and headaches. Psychiatric/Behavioral:  Positive for decreased concentration (doing well with medication ) and sleep disturbance. Negative for agitation, behavioral problems, dysphoric mood, hallucinations and suicidal ideas. The patient is not nervous/anxious and is not hyperactive.       OBJECTIVE:  /82 (Site: Left Upper Arm, Position: Sitting, Cuff Size: Medium Adult)   Pulse 63   Temp 98 °F (36.7 °C) (Temporal)   Ht 5' 8\" (1.727 m)   Wt 197 lb (89.4 kg)   SpO2 95%   BMI 29.95 kg/m²      Body mass index is 29.95 kg/m². Physical Exam  Vitals and nursing note reviewed. Constitutional:       General: He is not in acute distress. Appearance: Normal appearance. He is well-developed. HENT:      Head: Normocephalic and atraumatic. Eyes:      Conjunctiva/sclera: Conjunctivae normal.   Neck:      Thyroid: No thyromegaly. Cardiovascular:      Rate and Rhythm: Normal rate and regular rhythm. Heart sounds: Normal heart sounds. No murmur heard. No friction rub. No gallop. Pulmonary:      Effort: Pulmonary effort is normal.      Breath sounds: Normal breath sounds. Chest:      Chest wall: No tenderness. Abdominal:      General: There is no distension. Palpations: Abdomen is soft. Tenderness: There is no abdominal tenderness. Comments: No HSM   Musculoskeletal:         General: No swelling (knot behind right knee not tender not hot). Cervical back: Neck supple. Lymphadenopathy:      Cervical: No cervical adenopathy. Skin:     General: Skin is warm and dry. Findings: Lesion (lipoma back) present. Neurological:      General: No focal deficit present. Mental Status: He is alert. Gait: Gait normal.   Psychiatric:         Mood and Affect: Mood normal.         Behavior: Behavior normal.         Thought Content: Thought content normal.       ASSESSMENT/PLAN:    Ludy Gurrola was seen today for 3 month follow-up. Diagnoses and all orders for this visit:    Synovial cyst of right popliteal space  -     naproxen (NAPROSYN) 500 MG tablet;  Take 1 tablet by mouth 2 times daily (with meals)    Lipoma of torso    Asymptomatic microscopic hematuria  Comments:  has follow up appointment in 3 months      Attention deficit hyperactivity disorder (ADHD), predominantly inattentive type  Reviewed oarrs  Chronic adderall    Grief see if helps him sleep also   -     mirtazapine (REMERON) 15 MG tablet; Take 1 tablet by mouth nightly        Orders Placed This Encounter   Medications    mirtazapine (REMERON) 15 MG tablet     Sig: Take 1 tablet by mouth nightly     Dispense:  30 tablet     Refill:  5    naproxen (NAPROSYN) 500 MG tablet     Sig: Take 1 tablet by mouth 2 times daily (with meals)     Dispense:  30 tablet     Refill:  0        Return in about 3 months (around 6/7/2023), or if symptoms worsen or fail to improve, for add. There are no Patient Instructions on file for this visit.

## 2023-03-12 ASSESSMENT — ENCOUNTER SYMPTOMS
NAUSEA: 0
VOMITING: 0
DIARRHEA: 0
ABDOMINAL PAIN: 0
SHORTNESS OF BREATH: 0
COUGH: 0

## 2023-03-20 ENCOUNTER — TELEPHONE (OUTPATIENT)
Dept: FAMILY MEDICINE CLINIC | Age: 53
End: 2023-03-20

## 2023-03-20 DIAGNOSIS — F90.0 ATTENTION DEFICIT HYPERACTIVITY DISORDER (ADHD), PREDOMINANTLY INATTENTIVE TYPE: ICD-10-CM

## 2023-03-20 RX ORDER — NAPROXEN 500 MG/1
TABLET ORAL
Qty: 30 TABLET | Refills: 0 | Status: SHIPPED | OUTPATIENT
Start: 2023-03-20

## 2023-03-20 RX ORDER — DEXTROAMPHETAMINE SACCHARATE, AMPHETAMINE ASPARTATE, DEXTROAMPHETAMINE SULFATE AND AMPHETAMINE SULFATE 7.5; 7.5; 7.5; 7.5 MG/1; MG/1; MG/1; MG/1
30 TABLET ORAL DAILY
Qty: 30 TABLET | Refills: 0 | Status: SHIPPED | OUTPATIENT
Start: 2023-03-24 | End: 2023-04-23

## 2023-03-20 NOTE — TELEPHONE ENCOUNTER
----- Message from Marylou Husain sent at 3/20/2023  1:02 PM EDT -----  Subject: Refill Request    QUESTIONS  Name of Medication? amphetamine-dextroamphetamine (ADDERALL, 30MG,) 30 MG   tablet  Patient-reported dosage and instructions? 1 a day  How many days do you have left? 3  Preferred Pharmacy? [facility]ACY #[id number]  Pharmacy phone number (if available)? [phone]  Additional Information for Provider? rBayan Chiu would like a refill on his 30   MG Adderall tablets he takes one a day and has 3 ort 4 left. He would like   that sent to [facility]. He can be reached at [phone] ok to leave a   message  ---------------------------------------------------------------------------  --------------  [address]  What is the best way for the office to contact you? OK to leave message on   voicemail  Preferred Call Back Phone Number? [phone]  ---------------------------------------------------------------------------  --------------  SCRIPT ANSWERS  Relationship to Patient?  Self

## 2023-03-31 RX ORDER — MIRTAZAPINE 15 MG/1
15 TABLET, FILM COATED ORAL NIGHTLY
Qty: 30 TABLET | Refills: 5 | Status: SHIPPED | OUTPATIENT
Start: 2023-03-31

## 2023-04-20 DIAGNOSIS — F90.0 ATTENTION DEFICIT HYPERACTIVITY DISORDER (ADHD), PREDOMINANTLY INATTENTIVE TYPE: ICD-10-CM

## 2023-04-20 RX ORDER — DEXTROAMPHETAMINE SACCHARATE, AMPHETAMINE ASPARTATE, DEXTROAMPHETAMINE SULFATE AND AMPHETAMINE SULFATE 7.5; 7.5; 7.5; 7.5 MG/1; MG/1; MG/1; MG/1
30 TABLET ORAL DAILY
Qty: 30 TABLET | Refills: 0 | Status: SHIPPED | OUTPATIENT
Start: 2023-04-23 | End: 2023-05-23

## 2023-04-20 NOTE — TELEPHONE ENCOUNTER
----- Message from Neris Neely sent at 4/20/2023 12:09 PM EDT -----  Subject: Refill Request    QUESTIONS  Name of Medication? amphetamine-dextroamphetamine (ADDERALL, 30MG,) 30 MG   tablet  Patient-reported dosage and instructions? 30 MG tablet 1x day  How many days do you have left? 3  Preferred Pharmacy? [id number]  Pharmacy phone number (if available)? [phone]  ---------------------------------------------------------------------------  --------------  Junaid BELCHER  What is the best way for the office to contact you? OK to leave message on   voicemail  Preferred Call Back Phone Number? [phone]  ---------------------------------------------------------------------------  --------------  SCRIPT ANSWERS  Relationship to Patient?  Self

## 2023-05-19 ENCOUNTER — TELEPHONE (OUTPATIENT)
Dept: FAMILY MEDICINE CLINIC | Age: 53
End: 2023-05-19

## 2023-05-19 DIAGNOSIS — F90.0 ATTENTION DEFICIT HYPERACTIVITY DISORDER (ADHD), PREDOMINANTLY INATTENTIVE TYPE: ICD-10-CM

## 2023-05-19 NOTE — TELEPHONE ENCOUNTER
----- Message from Nailalani Brewer sent at 5/19/2023  2:31 PM EDT -----  Subject: Refill Request    QUESTIONS  Name of Medication? amphetamine-dextroamphetamine (ADDERALL, 30MG,) 30 MG   tablet  Patient-reported dosage and instructions? 1 a day  How many days do you have left? 3  Preferred Pharmacy? [facility]/PHARMACY #[id number]  Pharmacy phone number (if available)? [phone]  Additional Information for Provider? Manuel Haddad would like a refill of the   amphetamine-dextroamphetamine (ADDERALL, 30MG,) 30 MG tablets he takes one   per day and has three left, they can be sent to [facility], he can be   reached at [phone] ok to leave a message  ---------------------------------------------------------------------------  --------------  [address]  What is the best way for the office to contact you? OK to leave message on   voicemail  Preferred Call Back Phone Number? [phone]  ---------------------------------------------------------------------------  --------------  SCRIPT ANSWERS  Relationship to Patient?  Self

## 2023-05-20 RX ORDER — DEXTROAMPHETAMINE SACCHARATE, AMPHETAMINE ASPARTATE, DEXTROAMPHETAMINE SULFATE AND AMPHETAMINE SULFATE 7.5; 7.5; 7.5; 7.5 MG/1; MG/1; MG/1; MG/1
30 TABLET ORAL DAILY
Qty: 30 TABLET | Refills: 0 | Status: SHIPPED | OUTPATIENT
Start: 2023-05-22 | End: 2023-06-21

## 2023-06-06 ENCOUNTER — OFFICE VISIT (OUTPATIENT)
Dept: FAMILY MEDICINE CLINIC | Age: 53
End: 2023-06-06
Payer: COMMERCIAL

## 2023-06-06 VITALS
SYSTOLIC BLOOD PRESSURE: 137 MMHG | BODY MASS INDEX: 28.64 KG/M2 | OXYGEN SATURATION: 97 % | TEMPERATURE: 98.2 F | WEIGHT: 189 LBS | HEART RATE: 59 BPM | HEIGHT: 68 IN | DIASTOLIC BLOOD PRESSURE: 86 MMHG

## 2023-06-06 DIAGNOSIS — F90.0 ATTENTION DEFICIT HYPERACTIVITY DISORDER (ADHD), PREDOMINANTLY INATTENTIVE TYPE: Primary | ICD-10-CM

## 2023-06-06 PROCEDURE — 99212 OFFICE O/P EST SF 10 MIN: CPT | Performed by: INTERNAL MEDICINE

## 2023-06-06 RX ORDER — MIRTAZAPINE 15 MG/1
15 TABLET, FILM COATED ORAL NIGHTLY
Qty: 90 TABLET | Refills: 1 | Status: SHIPPED | OUTPATIENT
Start: 2023-06-06

## 2023-06-06 NOTE — PROGRESS NOTES
true   Transportation Needs: Unknown    Lack of Transportation (Medical): Not on file    Lack of Transportation (Non-Medical): No   Physical Activity: Not on file   Stress: Not on file   Social Connections: Not on file   Intimate Partner Violence: Not on file   Housing Stability: Unknown    Unable to Pay for Housing in the Last Year: Not on file    Number of Places Lived in the Last Year: Not on file    Unstable Housing in the Last Year: No      Past Medical History:   Diagnosis Date    ADD (attention deficit disorder)     Depression     Hypertension      Past Surgical History:   Procedure Laterality Date    FRACTURE SURGERY      right wrist    WISDOM TOOTH EXTRACTION         Review of Systems   Constitutional:  Negative for activity change, fatigue and unexpected weight change. HENT:  Negative for nosebleeds. Respiratory:  Negative for cough and shortness of breath. Cardiovascular:  Negative for chest pain, palpitations and leg swelling. Gastrointestinal:  Negative for abdominal pain, diarrhea, nausea and vomiting. Neurological:  Negative for dizziness, light-headedness and headaches. Psychiatric/Behavioral:  Positive for decreased concentration (doing well with medication ). Negative for agitation, behavioral problems, dysphoric mood, hallucinations, sleep disturbance and suicidal ideas. The patient is not nervous/anxious and is not hyperactive. OBJECTIVE:  /86 (Site: Right Upper Arm, Position: Sitting, Cuff Size: Medium Adult)   Pulse 59   Temp 98.2 °F (36.8 °C) (Oral)   Ht 5' 8\" (1.727 m)   Wt 189 lb (85.7 kg)   SpO2 97%   BMI 28.74 kg/m²      Body mass index is 28.74 kg/m². Physical Exam  Vitals and nursing note reviewed. Constitutional:       General: He is not in acute distress. Appearance: Normal appearance. He is well-developed. HENT:      Head: Normocephalic and atraumatic. Eyes:      Conjunctiva/sclera: Conjunctivae normal.   Neck:      Thyroid: No thyromegaly.

## 2023-06-10 ASSESSMENT — ENCOUNTER SYMPTOMS
DIARRHEA: 0
ABDOMINAL PAIN: 0
SHORTNESS OF BREATH: 0
NAUSEA: 0
VOMITING: 0
COUGH: 0

## 2023-07-07 RX ORDER — LORATADINE 10 MG/1
TABLET ORAL
Qty: 90 TABLET | Refills: 1 | Status: SHIPPED | OUTPATIENT
Start: 2023-07-07

## 2023-07-13 ENCOUNTER — TELEPHONE (OUTPATIENT)
Dept: FAMILY MEDICINE CLINIC | Age: 53
End: 2023-07-13

## 2023-07-13 DIAGNOSIS — F90.0 ATTENTION DEFICIT HYPERACTIVITY DISORDER (ADHD), PREDOMINANTLY INATTENTIVE TYPE: ICD-10-CM

## 2023-07-13 RX ORDER — DEXTROAMPHETAMINE SACCHARATE, AMPHETAMINE ASPARTATE, DEXTROAMPHETAMINE SULFATE AND AMPHETAMINE SULFATE 7.5; 7.5; 7.5; 7.5 MG/1; MG/1; MG/1; MG/1
30 TABLET ORAL DAILY
Qty: 30 TABLET | Refills: 0 | Status: SHIPPED | OUTPATIENT
Start: 2023-07-19 | End: 2023-08-18

## 2023-07-13 NOTE — TELEPHONE ENCOUNTER
----- Message from Anamtanna Romeroa sent at 7/13/2023 10:11 AM EDT -----  Subject: Refill Request    QUESTIONS  Name of Medication? amphetamine-dextroamphetamine (ADDERALL, 30MG,) 30 MG   tablet  Patient-reported dosage and instructions? 1 TAB A DAY  How many days do you have left? 7  [id number]  Pharmacy phone number (if available)? [phone]  Additional Information for Provider? PT NEEDS A REFILL , 30 DAY SUPPLY  ---------------------------------------------------------------------------  --------------  CALL BACK INFO  What is the best way for the office to contact you? OK to leave message on   voicemail  Preferred Call Back Phone Number? [phone]  ---------------------------------------------------------------------------  --------------  SCRIPT ANSWERS  Relationship to Patient?  Self

## 2023-08-14 DIAGNOSIS — F90.0 ATTENTION DEFICIT HYPERACTIVITY DISORDER (ADHD), PREDOMINANTLY INATTENTIVE TYPE: ICD-10-CM

## 2023-08-14 RX ORDER — DEXTROAMPHETAMINE SACCHARATE, AMPHETAMINE ASPARTATE, DEXTROAMPHETAMINE SULFATE AND AMPHETAMINE SULFATE 7.5; 7.5; 7.5; 7.5 MG/1; MG/1; MG/1; MG/1
30 TABLET ORAL DAILY
Qty: 30 TABLET | Refills: 0 | Status: SHIPPED | OUTPATIENT
Start: 2023-08-16 | End: 2023-09-15

## 2023-08-14 NOTE — TELEPHONE ENCOUNTER
----- Message from Gracie Chanell sent at 8/14/2023 12:14 PM EDT -----  Subject: Refill Request    QUESTIONS  Name of Medication? amphetamine-dextroamphetamine (ADDERALL, 30MG,) 30 MG   tablet  Patient-reported dosage and instructions? 30 mg daily  How many days do you have left? 3  [id number]  Pharmacy phone number (if available)? [phone]  ---------------------------------------------------------------------------  --------------  Virgil Weller INFO  What is the best way for the office to contact you? OK to leave message on   voicemail  Preferred Call Back Phone Number? [phone]  ---------------------------------------------------------------------------  --------------  SCRIPT ANSWERS  Relationship to Patient?  Self

## 2023-09-12 ENCOUNTER — OFFICE VISIT (OUTPATIENT)
Dept: FAMILY MEDICINE CLINIC | Age: 53
End: 2023-09-12

## 2023-09-12 VITALS
DIASTOLIC BLOOD PRESSURE: 82 MMHG | WEIGHT: 191.2 LBS | TEMPERATURE: 98.4 F | OXYGEN SATURATION: 96 % | RESPIRATION RATE: 16 BRPM | SYSTOLIC BLOOD PRESSURE: 132 MMHG | BODY MASS INDEX: 29.07 KG/M2 | HEART RATE: 62 BPM

## 2023-09-12 DIAGNOSIS — F90.0 ATTENTION DEFICIT HYPERACTIVITY DISORDER (ADHD), PREDOMINANTLY INATTENTIVE TYPE: ICD-10-CM

## 2023-09-12 DIAGNOSIS — H66.92 LEFT OTITIS MEDIA, UNSPECIFIED OTITIS MEDIA TYPE: Primary | ICD-10-CM

## 2023-09-12 LAB
INFLUENZA A ANTIGEN, POC: NEGATIVE
INFLUENZA B ANTIGEN, POC: NEGATIVE
LOT EXPIRE DATE: NORMAL
LOT KIT NUMBER: NORMAL
SARS-COV-2, POC: NORMAL
VALID INTERNAL CONTROL: YES
VENDOR AND KIT NAME POC: NORMAL

## 2023-09-12 RX ORDER — DEXTROAMPHETAMINE SACCHARATE, AMPHETAMINE ASPARTATE, DEXTROAMPHETAMINE SULFATE AND AMPHETAMINE SULFATE 7.5; 7.5; 7.5; 7.5 MG/1; MG/1; MG/1; MG/1
30 TABLET ORAL DAILY
Qty: 30 TABLET | Refills: 0 | Status: SHIPPED | OUTPATIENT
Start: 2023-09-14 | End: 2023-10-14

## 2023-09-12 RX ORDER — MIRTAZAPINE 15 MG/1
15 TABLET, FILM COATED ORAL NIGHTLY
Qty: 90 TABLET | Refills: 1 | Status: SHIPPED | OUTPATIENT
Start: 2023-09-12

## 2023-09-12 RX ORDER — AZITHROMYCIN 250 MG/1
250 TABLET, FILM COATED ORAL SEE ADMIN INSTRUCTIONS
Qty: 6 TABLET | Refills: 0 | Status: SHIPPED | OUTPATIENT
Start: 2023-09-12 | End: 2023-09-17

## 2023-09-12 ASSESSMENT — PATIENT HEALTH QUESTIONNAIRE - PHQ9
SUM OF ALL RESPONSES TO PHQ9 QUESTIONS 1 & 2: 1
SUM OF ALL RESPONSES TO PHQ QUESTIONS 1-9: 1
2. FEELING DOWN, DEPRESSED OR HOPELESS: 1
1. LITTLE INTEREST OR PLEASURE IN DOING THINGS: 0

## 2023-09-16 ASSESSMENT — ENCOUNTER SYMPTOMS
TROUBLE SWALLOWING: 1
SINUS PAIN: 1

## 2023-10-05 DIAGNOSIS — F90.0 ATTENTION DEFICIT HYPERACTIVITY DISORDER (ADHD), PREDOMINANTLY INATTENTIVE TYPE: ICD-10-CM

## 2023-10-05 RX ORDER — DEXTROAMPHETAMINE SACCHARATE, AMPHETAMINE ASPARTATE, DEXTROAMPHETAMINE SULFATE AND AMPHETAMINE SULFATE 7.5; 7.5; 7.5; 7.5 MG/1; MG/1; MG/1; MG/1
30 TABLET ORAL DAILY
Qty: 30 TABLET | Refills: 0 | Status: SHIPPED | OUTPATIENT
Start: 2023-10-12 | End: 2023-11-11

## 2023-10-05 NOTE — TELEPHONE ENCOUNTER
----- Message from Marin Stage sent at 10/5/2023 11:43 AM EDT -----  Subject: Refill Request    QUESTIONS  Name of Medication? amphetamine-dextroamphetamine (ADDERALL, 30MG,) 30 MG   tablet  Patient-reported dosage and instructions? 30 MG Take 1 tablet by mouth   daily for 30 days. How many days do you have left? 7  [id number]  Pharmacy phone number (if available)? [phone]  ---------------------------------------------------------------------------  --------------  Ely BELCHER  What is the best way for the office to contact you? OK to leave message on   voicemail  Preferred Call Back Phone Number? [phone]  ---------------------------------------------------------------------------  --------------  SCRIPT ANSWERS  Relationship to Patient?  Self

## 2023-11-07 DIAGNOSIS — F90.0 ATTENTION DEFICIT HYPERACTIVITY DISORDER (ADHD), PREDOMINANTLY INATTENTIVE TYPE: ICD-10-CM

## 2023-11-07 RX ORDER — DEXTROAMPHETAMINE SACCHARATE, AMPHETAMINE ASPARTATE, DEXTROAMPHETAMINE SULFATE AND AMPHETAMINE SULFATE 7.5; 7.5; 7.5; 7.5 MG/1; MG/1; MG/1; MG/1
30 TABLET ORAL DAILY
Qty: 30 TABLET | Refills: 0 | Status: SHIPPED | OUTPATIENT
Start: 2023-11-10 | End: 2023-12-10

## 2023-11-08 ENCOUNTER — OFFICE VISIT (OUTPATIENT)
Dept: FAMILY MEDICINE CLINIC | Age: 53
End: 2023-11-08
Payer: COMMERCIAL

## 2023-11-08 VITALS
WEIGHT: 192.8 LBS | SYSTOLIC BLOOD PRESSURE: 128 MMHG | TEMPERATURE: 97.2 F | HEIGHT: 68 IN | BODY MASS INDEX: 29.22 KG/M2 | DIASTOLIC BLOOD PRESSURE: 82 MMHG

## 2023-11-08 DIAGNOSIS — J01.90 ACUTE BACTERIAL SINUSITIS: Primary | ICD-10-CM

## 2023-11-08 DIAGNOSIS — B96.89 ACUTE BACTERIAL SINUSITIS: Primary | ICD-10-CM

## 2023-11-08 PROCEDURE — 99213 OFFICE O/P EST LOW 20 MIN: CPT | Performed by: INTERNAL MEDICINE

## 2023-11-08 RX ORDER — CEFUROXIME AXETIL 250 MG/1
250 TABLET ORAL 2 TIMES DAILY
Qty: 20 TABLET | Refills: 0 | Status: SHIPPED | OUTPATIENT
Start: 2023-11-08 | End: 2023-11-18

## 2023-11-08 ASSESSMENT — ENCOUNTER SYMPTOMS
SINUS PRESSURE: 1
SINUS PAIN: 1
COUGH: 0
RHINORRHEA: 1
ABDOMINAL PAIN: 0

## 2023-11-08 NOTE — PROGRESS NOTES
distress. Breath sounds: No wheezing. Abdominal:      General: There is no distension. Tenderness: There is no abdominal tenderness. Neurological:      Mental Status: He is alert.                   An electronic signature was used to authenticate this note.    --Aroldo Daniels DO

## 2023-11-08 NOTE — PATIENT INSTRUCTIONS
Thank you for choosing [facility]. Please bring a current list of medications to every appointment. Please contact your pharmacy for any prescription refill(s) that you are requesting.

## 2023-11-28 ENCOUNTER — OFFICE VISIT (OUTPATIENT)
Dept: FAMILY MEDICINE CLINIC | Age: 53
End: 2023-11-28
Payer: COMMERCIAL

## 2023-11-28 VITALS
DIASTOLIC BLOOD PRESSURE: 80 MMHG | TEMPERATURE: 98.6 F | WEIGHT: 200.6 LBS | HEART RATE: 56 BPM | BODY MASS INDEX: 30.4 KG/M2 | OXYGEN SATURATION: 95 % | SYSTOLIC BLOOD PRESSURE: 122 MMHG | HEIGHT: 68 IN

## 2023-11-28 DIAGNOSIS — Z51.81 MEDICATION MONITORING ENCOUNTER: ICD-10-CM

## 2023-11-28 DIAGNOSIS — F90.0 ATTENTION DEFICIT HYPERACTIVITY DISORDER (ADHD), PREDOMINANTLY INATTENTIVE TYPE: Primary | ICD-10-CM

## 2023-11-28 PROCEDURE — 99212 OFFICE O/P EST SF 10 MIN: CPT | Performed by: INTERNAL MEDICINE

## 2023-11-28 ASSESSMENT — ENCOUNTER SYMPTOMS
SHORTNESS OF BREATH: 0
COUGH: 0
DIARRHEA: 0
ABDOMINAL PAIN: 0
VOMITING: 0
NAUSEA: 0

## 2023-12-06 ENCOUNTER — OFFICE VISIT (OUTPATIENT)
Dept: FAMILY MEDICINE CLINIC | Age: 53
End: 2023-12-06
Payer: COMMERCIAL

## 2023-12-06 VITALS
BODY MASS INDEX: 30.42 KG/M2 | OXYGEN SATURATION: 96 % | SYSTOLIC BLOOD PRESSURE: 132 MMHG | DIASTOLIC BLOOD PRESSURE: 80 MMHG | TEMPERATURE: 98.8 F | HEART RATE: 69 BPM | WEIGHT: 200 LBS | RESPIRATION RATE: 16 BRPM

## 2023-12-06 DIAGNOSIS — Z23 NEED FOR IMMUNIZATION AGAINST INFLUENZA: ICD-10-CM

## 2023-12-06 DIAGNOSIS — F17.218 CIGARETTE NICOTINE DEPENDENCE WITH OTHER NICOTINE-INDUCED DISORDER: ICD-10-CM

## 2023-12-06 DIAGNOSIS — F90.0 ATTENTION DEFICIT HYPERACTIVITY DISORDER (ADHD), PREDOMINANTLY INATTENTIVE TYPE: ICD-10-CM

## 2023-12-06 DIAGNOSIS — J31.0 CHRONIC RHINITIS: ICD-10-CM

## 2023-12-06 DIAGNOSIS — J44.9 CHRONIC OBSTRUCTIVE PULMONARY DISEASE, UNSPECIFIED COPD TYPE (HCC): Primary | ICD-10-CM

## 2023-12-06 PROCEDURE — 90471 IMMUNIZATION ADMIN: CPT | Performed by: INTERNAL MEDICINE

## 2023-12-06 PROCEDURE — 99214 OFFICE O/P EST MOD 30 MIN: CPT | Performed by: INTERNAL MEDICINE

## 2023-12-06 PROCEDURE — 90674 CCIIV4 VAC NO PRSV 0.5 ML IM: CPT | Performed by: INTERNAL MEDICINE

## 2023-12-06 RX ORDER — DEXTROAMPHETAMINE SACCHARATE, AMPHETAMINE ASPARTATE, DEXTROAMPHETAMINE SULFATE AND AMPHETAMINE SULFATE 7.5; 7.5; 7.5; 7.5 MG/1; MG/1; MG/1; MG/1
30 TABLET ORAL DAILY
Qty: 30 TABLET | Refills: 0 | Status: SHIPPED | OUTPATIENT
Start: 2023-12-09 | End: 2024-01-08

## 2023-12-06 RX ORDER — MULTIVITAMIN WITH IRON
2 TABLET ORAL DAILY
COMMUNITY
End: 2023-12-09

## 2023-12-06 RX ORDER — DEXTROAMPHETAMINE SACCHARATE, AMPHETAMINE ASPARTATE, DEXTROAMPHETAMINE SULFATE AND AMPHETAMINE SULFATE 3.75; 3.75; 3.75; 3.75 MG/1; MG/1; MG/1; MG/1
15 TABLET ORAL
Qty: 30 TABLET | Refills: 0 | Status: SHIPPED | OUTPATIENT
Start: 2023-12-06 | End: 2024-01-05

## 2023-12-06 RX ORDER — AZELASTINE 1 MG/ML
2 SPRAY, METERED NASAL 2 TIMES DAILY
Qty: 120 ML | Refills: 1 | Status: SHIPPED | OUTPATIENT
Start: 2023-12-06

## 2023-12-06 ASSESSMENT — PATIENT HEALTH QUESTIONNAIRE - PHQ9
2. FEELING DOWN, DEPRESSED OR HOPELESS: 1
SUM OF ALL RESPONSES TO PHQ QUESTIONS 1-9: 1
SUM OF ALL RESPONSES TO PHQ QUESTIONS 1-9: 1
SUM OF ALL RESPONSES TO PHQ9 QUESTIONS 1 & 2: 1
1. LITTLE INTEREST OR PLEASURE IN DOING THINGS: 0
SUM OF ALL RESPONSES TO PHQ QUESTIONS 1-9: 1
SUM OF ALL RESPONSES TO PHQ QUESTIONS 1-9: 1

## 2023-12-06 NOTE — PROGRESS NOTES
SUBJECTIVE:  Amalia Larose is a 52 y.o. male being evaluated for:    Chief Complaint   Patient presents with    sinuses     Pt still having some post nasal drainage and coughing up some sputum yelow/brownish in color but not much. Pt would like to get an order for the CT Lung Screening. Flu Vaccine     Pt would also like to get a flu vaccine        HPI   Hacks every now and then  Post nasal drip  Sinuses are draining   Burning in his eyes  No ear pain or pressure   More sick over the last year  Tired of being sick    Wheezing at night  not sob  with activity no chest pain  ADD scatter brained by the end of the day Takes med at 10 to 10:30 in the am and by the evening lack of concentration is back  worse after lunch    Depression worse  Not suicidal  Dad passed away and did all the holiday celebreation   Allergies   Allergen Reactions    Naprosyn [Naproxen]      Diarrhea     Lisinopril Other (See Comments)     Cough     Current Outpatient Medications   Medication Sig Dispense Refill    Multiple Vitamin (MULTIVITAMIN) TABS tablet Take 2 tablets by mouth daily      amphetamine-dextroamphetamine (ADDERALL, 15MG,) 15 MG tablet Take 1 tablet by mouth Daily with lunch for 30 days. Max Daily Amount: 15 mg 30 tablet 0    amphetamine-dextroamphetamine (ADDERALL, 30MG,) 30 MG tablet Take 1 tablet by mouth daily for 30 days. Max Daily Amount: 30 mg 30 tablet 0    azelastine (ASTELIN) 0.1 % nasal spray 2 sprays by Nasal route 2 times daily Use in each nostril as directed 120 mL 1    mirtazapine (REMERON) 15 MG tablet Take 1 tablet by mouth nightly 90 tablet 1    loratadine (CLARITIN) 10 MG tablet TAKE 1 TABLET BY MOUTH EVERY DAY 90 tablet 1    montelukast (SINGULAIR) 10 MG tablet TAKE 1 TABLET BY MOUTH EVERY DAY 90 tablet 1     No current facility-administered medications for this visit.          Social History     Socioeconomic History    Marital status:      Spouse name: Not on file    Number of children: Not on

## 2023-12-09 ASSESSMENT — ENCOUNTER SYMPTOMS
ABDOMINAL PAIN: 0
DIARRHEA: 0
EYE DISCHARGE: 1
WHEEZING: 1
SINUS PRESSURE: 1
NAUSEA: 0
EYE ITCHING: 1
VOMITING: 0
SHORTNESS OF BREATH: 0

## 2023-12-15 RX ORDER — MONTELUKAST SODIUM 10 MG/1
TABLET ORAL
Qty: 90 TABLET | Refills: 1 | Status: SHIPPED | OUTPATIENT
Start: 2023-12-15

## 2023-12-27 ENCOUNTER — TELEPHONE (OUTPATIENT)
Dept: FAMILY MEDICINE CLINIC | Age: 53
End: 2023-12-27

## 2023-12-27 NOTE — TELEPHONE ENCOUNTER
----- Message from Vinnie Rivera sent at 12/27/2023  1:38 PM EST -----  Subject: Results Request    QUESTIONS  Results: cats scan results that the pt had done over a week ago. ; Ordered   by:   Date Performed:   ---------------------------------------------------------------------------  --------------  Mirna RECINOS    [phone]; OK to leave message on voicemail  ---------------------------------------------------------------------------  --------------

## 2023-12-28 ENCOUNTER — TELEPHONE (OUTPATIENT)
Dept: FAMILY MEDICINE CLINIC | Age: 53
End: 2023-12-28

## 2023-12-28 NOTE — TELEPHONE ENCOUNTER
CT scan with emphysema and a very small right upper lobe nodule. Nothing that looks suspicious for cancer there is a sebaceous cyst in your left back nothing concerning.   To recheck yearly

## 2024-01-04 ENCOUNTER — OFFICE VISIT (OUTPATIENT)
Dept: FAMILY MEDICINE CLINIC | Age: 54
End: 2024-01-04
Payer: COMMERCIAL

## 2024-01-04 VITALS
TEMPERATURE: 98.1 F | HEART RATE: 69 BPM | BODY MASS INDEX: 30.76 KG/M2 | WEIGHT: 196 LBS | OXYGEN SATURATION: 98 % | DIASTOLIC BLOOD PRESSURE: 84 MMHG | SYSTOLIC BLOOD PRESSURE: 158 MMHG | HEIGHT: 67 IN

## 2024-01-04 DIAGNOSIS — R03.0 BORDERLINE HYPERTENSION: ICD-10-CM

## 2024-01-04 DIAGNOSIS — F90.0 ATTENTION DEFICIT HYPERACTIVITY DISORDER (ADHD), PREDOMINANTLY INATTENTIVE TYPE: ICD-10-CM

## 2024-01-04 DIAGNOSIS — J02.9 ACUTE PHARYNGITIS, UNSPECIFIED ETIOLOGY: Primary | ICD-10-CM

## 2024-01-04 LAB — S PYO AG THROAT QL: NORMAL

## 2024-01-04 PROCEDURE — 99213 OFFICE O/P EST LOW 20 MIN: CPT | Performed by: INTERNAL MEDICINE

## 2024-01-04 PROCEDURE — 87880 STREP A ASSAY W/OPTIC: CPT | Performed by: INTERNAL MEDICINE

## 2024-01-04 RX ORDER — DEXTROAMPHETAMINE SACCHARATE, AMPHETAMINE ASPARTATE, DEXTROAMPHETAMINE SULFATE AND AMPHETAMINE SULFATE 7.5; 7.5; 7.5; 7.5 MG/1; MG/1; MG/1; MG/1
30 TABLET ORAL DAILY
Qty: 30 TABLET | Refills: 0 | Status: CANCELLED | OUTPATIENT
Start: 2024-01-04 | End: 2024-02-03

## 2024-01-04 RX ORDER — DEXTROAMPHETAMINE SACCHARATE, AMPHETAMINE ASPARTATE, DEXTROAMPHETAMINE SULFATE AND AMPHETAMINE SULFATE 7.5; 7.5; 7.5; 7.5 MG/1; MG/1; MG/1; MG/1
30 TABLET ORAL DAILY
Qty: 30 TABLET | Refills: 0 | Status: SHIPPED | OUTPATIENT
Start: 2024-01-06 | End: 2024-02-05

## 2024-01-04 RX ORDER — DEXTROAMPHETAMINE SACCHARATE, AMPHETAMINE ASPARTATE, DEXTROAMPHETAMINE SULFATE AND AMPHETAMINE SULFATE 3.75; 3.75; 3.75; 3.75 MG/1; MG/1; MG/1; MG/1
15 TABLET ORAL
Qty: 30 TABLET | Refills: 0 | Status: SHIPPED | OUTPATIENT
Start: 2024-01-06 | End: 2024-02-05

## 2024-01-04 RX ORDER — DEXTROAMPHETAMINE SACCHARATE, AMPHETAMINE ASPARTATE, DEXTROAMPHETAMINE SULFATE AND AMPHETAMINE SULFATE 3.75; 3.75; 3.75; 3.75 MG/1; MG/1; MG/1; MG/1
15 TABLET ORAL
Qty: 30 TABLET | Refills: 0 | Status: CANCELLED | OUTPATIENT
Start: 2024-01-04 | End: 2024-02-03

## 2024-01-04 ASSESSMENT — PATIENT HEALTH QUESTIONNAIRE - PHQ9
SUM OF ALL RESPONSES TO PHQ QUESTIONS 1-9: 0
2. FEELING DOWN, DEPRESSED OR HOPELESS: 0
1. LITTLE INTEREST OR PLEASURE IN DOING THINGS: 0
SUM OF ALL RESPONSES TO PHQ QUESTIONS 1-9: 0
SUM OF ALL RESPONSES TO PHQ QUESTIONS 1-9: 0
SUM OF ALL RESPONSES TO PHQ9 QUESTIONS 1 & 2: 0
SUM OF ALL RESPONSES TO PHQ QUESTIONS 1-9: 0

## 2024-01-04 NOTE — PROGRESS NOTES
note reviewed.   Constitutional:       General: He is not in acute distress.     Appearance: Normal appearance. He is well-developed.   HENT:      Head: Normocephalic and atraumatic.      Right Ear: Tympanic membrane and ear canal normal.      Left Ear: Tympanic membrane and ear canal normal.      Nose: Congestion present.      Mouth/Throat:      Pharynx: Posterior oropharyngeal erythema present. No oropharyngeal exudate.   Eyes:      Conjunctiva/sclera: Conjunctivae normal.   Neck:      Thyroid: No thyromegaly.   Cardiovascular:      Rate and Rhythm: Normal rate and regular rhythm.      Heart sounds: Normal heart sounds. No murmur heard.     No friction rub. No gallop.   Pulmonary:      Effort: Pulmonary effort is normal.      Breath sounds: Normal breath sounds.   Chest:      Chest wall: No tenderness.   Abdominal:      General: There is no distension.      Palpations: Abdomen is soft.      Tenderness: There is no abdominal tenderness.   Musculoskeletal:         General: No swelling.      Cervical back: Neck supple.   Lymphadenopathy:      Cervical: No cervical adenopathy.   Skin:     General: Skin is warm and dry.   Neurological:      General: No focal deficit present.      Mental Status: He is alert.      Gait: Gait normal.         ASSESSMENT/PLAN:    Jus was seen today for pharyngitis.    Diagnoses and all orders for this visit:    Acute pharyngitis, unspecified etiology negative likely viral improving  Note for work   -     POCT rapid strep A    Attention deficit hyperactivity disorder (ADHD), predominantly inattentive type reviewed oarrs   -     amphetamine-dextroamphetamine (ADDERALL, 30MG,) 30 MG tablet; Take 1 tablet by mouth daily for 30 days. Max Daily Amount: 30 mg  -     amphetamine-dextroamphetamine (ADDERALL, 15MG,) 15 MG tablet; Take 1 tablet by mouth Daily with lunch for 30 days. Max Daily Amount: 15 mg    Borderline hypertension  low salt and low caffeine diet         Orders Placed This

## 2024-01-04 NOTE — TELEPHONE ENCOUNTER
----- Message from Cassie Melo sent at 1/4/2024  8:34 AM EST -----  Subject: Refill Request    QUESTIONS  Name of Medication? amphetamine-dextroamphetamine (ADDERALL, 15MG,) 15 MG   tablet  Patient-reported dosage and instructions? 15 MG Tablet 1 Tablet once per   day  How many days do you have left? 1  [id number]  Pharmacy phone number (if available)? [phone]  ---------------------------------------------------------------------------  --------------,  Name of Medication? amphetamine-dextroamphetamine (ADDERALL, 30MG,) 30 MG   tablet  Patient-reported dosage and instructions? 30 MG Tablet 1 Tablet once per   day  How many days do you have left? 4  Preferred Pharmacy? [facility]ACY #[id number]  Pharmacy phone number (if available)? [phone]  ---------------------------------------------------------------------------  --------------  CALL BACK INFO  What is the best way for the office to contact you? OK to leave message on   voicemail  Preferred Call Back Phone Number? [phone]  ---------------------------------------------------------------------------  --------------  SCRIPT ANSWERS  Relationship to Patient? Self

## 2024-01-05 RX ORDER — LORATADINE 10 MG/1
TABLET ORAL
Qty: 90 TABLET | Refills: 1 | Status: SHIPPED | OUTPATIENT
Start: 2024-01-05

## 2024-01-07 ASSESSMENT — ENCOUNTER SYMPTOMS
ABDOMINAL PAIN: 0
DIARRHEA: 0
COUGH: 0
RHINORRHEA: 0
WHEEZING: 0
NAUSEA: 0
BLOOD IN STOOL: 0
VOMITING: 0
SHORTNESS OF BREATH: 0
SINUS PRESSURE: 0
SORE THROAT: 1

## 2024-01-31 DIAGNOSIS — F90.0 ATTENTION DEFICIT HYPERACTIVITY DISORDER (ADHD), PREDOMINANTLY INATTENTIVE TYPE: ICD-10-CM

## 2024-01-31 RX ORDER — DEXTROAMPHETAMINE SACCHARATE, AMPHETAMINE ASPARTATE, DEXTROAMPHETAMINE SULFATE AND AMPHETAMINE SULFATE 7.5; 7.5; 7.5; 7.5 MG/1; MG/1; MG/1; MG/1
30 TABLET ORAL DAILY
Qty: 30 TABLET | Refills: 0 | Status: SHIPPED | OUTPATIENT
Start: 2024-02-03 | End: 2024-03-04

## 2024-01-31 RX ORDER — DEXTROAMPHETAMINE SACCHARATE, AMPHETAMINE ASPARTATE, DEXTROAMPHETAMINE SULFATE AND AMPHETAMINE SULFATE 3.75; 3.75; 3.75; 3.75 MG/1; MG/1; MG/1; MG/1
15 TABLET ORAL
Qty: 30 TABLET | Refills: 0 | Status: SHIPPED | OUTPATIENT
Start: 2024-02-03 | End: 2024-03-04

## 2024-01-31 NOTE — TELEPHONE ENCOUNTER
----- Message from Manisha Juarez sent at 1/31/2024  1:10 PM EST -----  Subject: Refill Request    QUESTIONS  Name of Medication? amphetamine-dextroamphetamine (ADDERALL, 30MG,) 30 MG   tablet  Patient-reported dosage and instructions? 30 mg once a day   How many days do you have left? 4  Preferred Pharmacy? "[facility]PH[facility]ACY #[id number]  Pharmacy phone number (if available)? [phone]  ---------------------------------------------------------------------------  --------------,  Name of Medication? amphetamine-dextroamphetamine (ADDERALL, 15MG,) 15 MG   tablet  Patient-reported dosage and instructions? 15 mg daily   How many days do you have left? 4  Preferred Pharmacy? "[facility]ACY #[id number]  Pharmacy phone number (if available)? [phone]  ---------------------------------------------------------------------------  --------------  CALL BACK INFO  What is the best way for the office to contact you? OK to leave message on   voicemail  Preferred Call Back Phone Number? [phone]  ---------------------------------------------------------------------------  --------------  SCRIPT ANSWERS  Relationship to Patient? Self

## 2024-01-31 NOTE — TELEPHONE ENCOUNTER
----- Message from Manisha Juarez sent at 1/31/2024  1:10 PM EST -----  Subject: Refill Request    QUESTIONS  Name of Medication? amphetamine-dextroamphetamine (ADDERALL, 30MG,) 30 MG   tablet  Patient-reported dosage and instructions? 30 mg once a day   How many days do you have left? 4  Preferred Pharmacy? [facility] #[id number]  Pharmacy phone number (if available)? [phone]  ---------------------------------------------------------------------------  --------------,  Name of Medication? amphetamine-dextroamphetamine (ADDERALL, 15MG,) 15 MG   tablet  Patient-reported dosage and instructions? 15 mg daily   How many days do you have left? 4  Preferred Pharmacy? [facility]Y #[id number]  Pharmacy phone number (if available)? [phone]  ---------------------------------------------------------------------------  --------------  CALL BACK INFO  What is the best way for the office to contact you? OK to leave message on   voicemail  Preferred Call Back Phone Number? [phone]  ---------------------------------------------------------------------------  --------------  SCRIPT ANSWERS  Relationship to Patient? Self

## 2024-03-05 ENCOUNTER — OFFICE VISIT (OUTPATIENT)
Dept: FAMILY MEDICINE CLINIC | Age: 54
End: 2024-03-05
Payer: COMMERCIAL

## 2024-03-05 VITALS
WEIGHT: 197.6 LBS | RESPIRATION RATE: 16 BRPM | SYSTOLIC BLOOD PRESSURE: 136 MMHG | OXYGEN SATURATION: 96 % | DIASTOLIC BLOOD PRESSURE: 72 MMHG | BODY MASS INDEX: 30.95 KG/M2 | HEART RATE: 68 BPM | TEMPERATURE: 98.1 F

## 2024-03-05 DIAGNOSIS — F90.0 ATTENTION DEFICIT HYPERACTIVITY DISORDER (ADHD), PREDOMINANTLY INATTENTIVE TYPE: Primary | ICD-10-CM

## 2024-03-05 DIAGNOSIS — Z51.81 MEDICATION MONITORING ENCOUNTER: ICD-10-CM

## 2024-03-05 DIAGNOSIS — F34.1 DYSTHYMIA: ICD-10-CM

## 2024-03-05 PROCEDURE — 99213 OFFICE O/P EST LOW 20 MIN: CPT | Performed by: INTERNAL MEDICINE

## 2024-03-05 RX ORDER — DEXTROAMPHETAMINE SACCHARATE, AMPHETAMINE ASPARTATE, DEXTROAMPHETAMINE SULFATE AND AMPHETAMINE SULFATE 7.5; 7.5; 7.5; 7.5 MG/1; MG/1; MG/1; MG/1
30 TABLET ORAL DAILY
Qty: 30 TABLET | Refills: 0 | Status: SHIPPED | OUTPATIENT
Start: 2024-03-05 | End: 2024-04-04

## 2024-03-05 RX ORDER — DEXTROAMPHETAMINE SACCHARATE, AMPHETAMINE ASPARTATE, DEXTROAMPHETAMINE SULFATE AND AMPHETAMINE SULFATE 3.75; 3.75; 3.75; 3.75 MG/1; MG/1; MG/1; MG/1
15 TABLET ORAL
Qty: 30 TABLET | Refills: 0 | Status: SHIPPED | OUTPATIENT
Start: 2024-03-05 | End: 2024-04-04

## 2024-03-05 RX ORDER — MIRTAZAPINE 15 MG/1
15 TABLET, FILM COATED ORAL NIGHTLY
Qty: 90 TABLET | Refills: 1 | Status: SHIPPED | OUTPATIENT
Start: 2024-03-05

## 2024-03-05 ASSESSMENT — PATIENT HEALTH QUESTIONNAIRE - PHQ9
SUM OF ALL RESPONSES TO PHQ QUESTIONS 1-9: 0
SUM OF ALL RESPONSES TO PHQ QUESTIONS 1-9: 0
SUM OF ALL RESPONSES TO PHQ9 QUESTIONS 1 & 2: 0
SUM OF ALL RESPONSES TO PHQ QUESTIONS 1-9: 0
SUM OF ALL RESPONSES TO PHQ QUESTIONS 1-9: 0
1. LITTLE INTEREST OR PLEASURE IN DOING THINGS: 0
2. FEELING DOWN, DEPRESSED OR HOPELESS: 0

## 2024-03-05 NOTE — PROGRESS NOTES
URINE        Orders Placed This Encounter   Medications    amphetamine-dextroamphetamine (ADDERALL, 15MG,) 15 MG tablet     Sig: Take 1 tablet by mouth Daily with lunch for 30 days. Max Daily Amount: 15 mg     Dispense:  30 tablet     Refill:  0    amphetamine-dextroamphetamine (ADDERALL, 30MG,) 30 MG tablet     Sig: Take 1 tablet by mouth daily for 30 days. Max Daily Amount: 30 mg     Dispense:  30 tablet     Refill:  0    mirtazapine (REMERON) 15 MG tablet     Sig: Take 1 tablet by mouth nightly     Dispense:  90 tablet     Refill:  1        Return in about 3 months (around 6/5/2024), or if symptoms worsen or fail to improve.     There are no Patient Instructions on file for this visit.

## 2024-03-06 LAB
AMPHETAMINES UR QL SCN>1000 NG/ML: POSITIVE
BARBITURATES UR QL SCN>200 NG/ML: ABNORMAL
BENZODIAZ UR QL SCN>200 NG/ML: ABNORMAL
CANNABINOIDS UR QL SCN>50 NG/ML: POSITIVE
COCAINE UR QL SCN: ABNORMAL
DRUG SCREEN COMMENT UR-IMP: ABNORMAL
FENTANYL SCREEN, URINE: ABNORMAL
METHADONE UR QL SCN>300 NG/ML: ABNORMAL
OPIATES UR QL SCN>300 NG/ML: ABNORMAL
OXYCODONE UR QL SCN: ABNORMAL
PCP UR QL SCN>25 NG/ML: ABNORMAL
PH UR STRIP: 5.5 [PH]

## 2024-03-08 ASSESSMENT — ENCOUNTER SYMPTOMS
ABDOMINAL PAIN: 0
DIARRHEA: 0
WHEEZING: 0
NAUSEA: 0
VOMITING: 0
COUGH: 0
SHORTNESS OF BREATH: 0

## 2024-04-01 DIAGNOSIS — F90.0 ATTENTION DEFICIT HYPERACTIVITY DISORDER (ADHD), PREDOMINANTLY INATTENTIVE TYPE: ICD-10-CM

## 2024-04-01 RX ORDER — DEXTROAMPHETAMINE SACCHARATE, AMPHETAMINE ASPARTATE, DEXTROAMPHETAMINE SULFATE AND AMPHETAMINE SULFATE 3.75; 3.75; 3.75; 3.75 MG/1; MG/1; MG/1; MG/1
15 TABLET ORAL
Qty: 30 TABLET | Refills: 0 | Status: SHIPPED | OUTPATIENT
Start: 2024-04-01 | End: 2024-05-01

## 2024-04-01 RX ORDER — DEXTROAMPHETAMINE SACCHARATE, AMPHETAMINE ASPARTATE, DEXTROAMPHETAMINE SULFATE AND AMPHETAMINE SULFATE 7.5; 7.5; 7.5; 7.5 MG/1; MG/1; MG/1; MG/1
30 TABLET ORAL DAILY
Qty: 30 TABLET | Refills: 0 | Status: SHIPPED | OUTPATIENT
Start: 2024-04-02 | End: 2024-05-02

## 2024-04-24 DIAGNOSIS — F90.0 ATTENTION DEFICIT HYPERACTIVITY DISORDER (ADHD), PREDOMINANTLY INATTENTIVE TYPE: ICD-10-CM

## 2024-04-24 RX ORDER — DEXTROAMPHETAMINE SACCHARATE, AMPHETAMINE ASPARTATE, DEXTROAMPHETAMINE SULFATE AND AMPHETAMINE SULFATE 3.75; 3.75; 3.75; 3.75 MG/1; MG/1; MG/1; MG/1
15 TABLET ORAL
Qty: 30 TABLET | Refills: 0 | Status: SHIPPED | OUTPATIENT
Start: 2024-04-30 | End: 2024-05-30

## 2024-04-24 RX ORDER — DEXTROAMPHETAMINE SACCHARATE, AMPHETAMINE ASPARTATE, DEXTROAMPHETAMINE SULFATE AND AMPHETAMINE SULFATE 7.5; 7.5; 7.5; 7.5 MG/1; MG/1; MG/1; MG/1
30 TABLET ORAL DAILY
Qty: 30 TABLET | Refills: 0 | Status: SHIPPED | OUTPATIENT
Start: 2024-04-30 | End: 2024-05-30

## 2024-05-28 DIAGNOSIS — F90.0 ATTENTION DEFICIT HYPERACTIVITY DISORDER (ADHD), PREDOMINANTLY INATTENTIVE TYPE: ICD-10-CM

## 2024-05-28 RX ORDER — DEXTROAMPHETAMINE SACCHARATE, AMPHETAMINE ASPARTATE, DEXTROAMPHETAMINE SULFATE AND AMPHETAMINE SULFATE 7.5; 7.5; 7.5; 7.5 MG/1; MG/1; MG/1; MG/1
30 TABLET ORAL DAILY
Qty: 30 TABLET | Refills: 0 | Status: SHIPPED | OUTPATIENT
Start: 2024-05-28 | End: 2024-06-27

## 2024-05-28 RX ORDER — DEXTROAMPHETAMINE SACCHARATE, AMPHETAMINE ASPARTATE, DEXTROAMPHETAMINE SULFATE AND AMPHETAMINE SULFATE 3.75; 3.75; 3.75; 3.75 MG/1; MG/1; MG/1; MG/1
15 TABLET ORAL
Qty: 30 TABLET | Refills: 0 | Status: SHIPPED | OUTPATIENT
Start: 2024-05-28 | End: 2024-06-27

## 2024-06-03 ENCOUNTER — TELEPHONE (OUTPATIENT)
Dept: FAMILY MEDICINE CLINIC | Age: 54
End: 2024-06-03

## 2024-06-03 NOTE — TELEPHONE ENCOUNTER
----- Message from Sridhar Miranda sent at 6/3/2024 10:09 AM EDT -----  Regarding: ECC Appointment Request  ECC Appointment Request    Patient needs appointment for ECC Appointment Type: Existing Condition Follow Up.    Reason for Appointment Request: Available appointments did not meet patient need.    Patient would like to reschedule his appointment a week before the 24th at an afternoon or late morning  --------------------------------------------------------------------------------------------------------------------------    Relationship to Patient: Self     Call Back Information: OK to leave message on voicemail  Preferred Call Back Number: Phone [phone]

## 2024-06-05 ENCOUNTER — OFFICE VISIT (OUTPATIENT)
Dept: FAMILY MEDICINE CLINIC | Age: 54
End: 2024-06-05
Payer: COMMERCIAL

## 2024-06-05 VITALS
BODY MASS INDEX: 28.04 KG/M2 | DIASTOLIC BLOOD PRESSURE: 74 MMHG | HEART RATE: 63 BPM | RESPIRATION RATE: 16 BRPM | HEIGHT: 68 IN | OXYGEN SATURATION: 96 % | SYSTOLIC BLOOD PRESSURE: 134 MMHG | TEMPERATURE: 97.8 F | WEIGHT: 185 LBS

## 2024-06-05 DIAGNOSIS — Z12.11 COLON CANCER SCREENING: ICD-10-CM

## 2024-06-05 DIAGNOSIS — Z12.5 PROSTATE CANCER SCREENING: ICD-10-CM

## 2024-06-05 DIAGNOSIS — Z13.220 SCREENING CHOLESTEROL LEVEL: ICD-10-CM

## 2024-06-05 DIAGNOSIS — F90.0 ATTENTION DEFICIT HYPERACTIVITY DISORDER (ADHD), PREDOMINANTLY INATTENTIVE TYPE: Primary | ICD-10-CM

## 2024-06-05 DIAGNOSIS — R73.9 HYPERGLYCEMIA: ICD-10-CM

## 2024-06-05 PROCEDURE — 99212 OFFICE O/P EST SF 10 MIN: CPT | Performed by: INTERNAL MEDICINE

## 2024-06-05 SDOH — ECONOMIC STABILITY: FOOD INSECURITY: WITHIN THE PAST 12 MONTHS, THE FOOD YOU BOUGHT JUST DIDN'T LAST AND YOU DIDN'T HAVE MONEY TO GET MORE.: NEVER TRUE

## 2024-06-05 SDOH — ECONOMIC STABILITY: FOOD INSECURITY: WITHIN THE PAST 12 MONTHS, YOU WORRIED THAT YOUR FOOD WOULD RUN OUT BEFORE YOU GOT MONEY TO BUY MORE.: NEVER TRUE

## 2024-06-05 SDOH — ECONOMIC STABILITY: INCOME INSECURITY: HOW HARD IS IT FOR YOU TO PAY FOR THE VERY BASICS LIKE FOOD, HOUSING, MEDICAL CARE, AND HEATING?: NOT HARD AT ALL

## 2024-06-05 NOTE — PROGRESS NOTES
on file     Social Determinants of Health     Financial Resource Strain: Low Risk  (6/5/2024)    Overall Financial Resource Strain (CARDIA)     Difficulty of Paying Living Expenses: Not hard at all   Food Insecurity: No Food Insecurity (6/5/2024)    Hunger Vital Sign     Worried About Running Out of Food in the Last Year: Never true     Ran Out of Food in the Last Year: Never true   Transportation Needs: Unknown (6/5/2024)    PRAPARE - Transportation     Lack of Transportation (Medical): Not on file     Lack of Transportation (Non-Medical): No   Physical Activity: Not on file   Stress: Not on file   Social Connections: Not on file   Intimate Partner Violence: Not on file   Housing Stability: Unknown (6/5/2024)    Housing Stability Vital Sign     Unable to Pay for Housing in the Last Year: Not on file     Number of Places Lived in the Last Year: Not on file     Unstable Housing in the Last Year: No      Past Medical History:   Diagnosis Date    ADD (attention deficit disorder)     Depression     Hypertension      Past Surgical History:   Procedure Laterality Date    FRACTURE SURGERY      right wrist    WISDOM TOOTH EXTRACTION         Review of Systems   Constitutional:  Positive for fatigue (work schedule) and unexpected weight change (working on weight loss). Negative for activity change.   HENT:  Negative for nosebleeds.    Respiratory:  Negative for cough, shortness of breath and wheezing.    Cardiovascular:  Negative for chest pain, palpitations and leg swelling.   Gastrointestinal:  Negative for abdominal pain, diarrhea, nausea and vomiting.   Genitourinary:  Negative for difficulty urinating.   Neurological:  Negative for dizziness, light-headedness and headaches.   Psychiatric/Behavioral:  Positive for decreased concentration (doing well with medication ). Negative for agitation, behavioral problems, dysphoric mood (ok on meds), hallucinations, sleep disturbance and suicidal ideas. The patient is not

## 2024-06-21 ENCOUNTER — OFFICE VISIT (OUTPATIENT)
Dept: FAMILY MEDICINE CLINIC | Age: 54
End: 2024-06-21
Payer: COMMERCIAL

## 2024-06-21 VITALS
WEIGHT: 182 LBS | TEMPERATURE: 98.2 F | BODY MASS INDEX: 27.58 KG/M2 | SYSTOLIC BLOOD PRESSURE: 148 MMHG | OXYGEN SATURATION: 97 % | DIASTOLIC BLOOD PRESSURE: 88 MMHG | HEART RATE: 64 BPM | HEIGHT: 68 IN

## 2024-06-21 DIAGNOSIS — F34.1 DYSTHYMIA: Primary | ICD-10-CM

## 2024-06-21 DIAGNOSIS — F90.0 ATTENTION DEFICIT HYPERACTIVITY DISORDER (ADHD), PREDOMINANTLY INATTENTIVE TYPE: ICD-10-CM

## 2024-06-21 PROCEDURE — 99213 OFFICE O/P EST LOW 20 MIN: CPT | Performed by: NURSE PRACTITIONER

## 2024-06-21 RX ORDER — DEXTROAMPHETAMINE SACCHARATE, AMPHETAMINE ASPARTATE, DEXTROAMPHETAMINE SULFATE AND AMPHETAMINE SULFATE 7.5; 7.5; 7.5; 7.5 MG/1; MG/1; MG/1; MG/1
30 TABLET ORAL DAILY
Qty: 30 TABLET | Refills: 0 | Status: SHIPPED | OUTPATIENT
Start: 2024-06-21 | End: 2024-07-21

## 2024-06-21 RX ORDER — VENLAFAXINE HYDROCHLORIDE 37.5 MG/1
37.5 CAPSULE, EXTENDED RELEASE ORAL DAILY
Qty: 30 CAPSULE | Refills: 3 | Status: SHIPPED | OUTPATIENT
Start: 2024-06-21

## 2024-06-21 RX ORDER — DEXTROAMPHETAMINE SACCHARATE, AMPHETAMINE ASPARTATE, DEXTROAMPHETAMINE SULFATE AND AMPHETAMINE SULFATE 3.75; 3.75; 3.75; 3.75 MG/1; MG/1; MG/1; MG/1
15 TABLET ORAL
Qty: 30 TABLET | Refills: 0 | Status: SHIPPED | OUTPATIENT
Start: 2024-06-21 | End: 2024-07-21

## 2024-06-21 NOTE — PROGRESS NOTES
Jus Ennis (:  1970) is a 54 y.o. male,Established patient, here for evaluation of the following chief complaint(s):  Depression (Pts depression is worsening. Pt states he is taking mirtazapine for it now at night. Pt said been on this for years. Loss of appetite and sleeping a lot.   )      Assessment & Plan   ASSESSMENT/PLAN:  1. Dysthymia  Worsening. Trial effexor. Follow pt education included  - venlafaxine (EFFEXOR XR) 37.5 MG extended release capsule; Take 1 capsule by mouth daily  Dispense: 30 capsule; Refill: 3       Return in about 4 weeks (around 2024) with Dr. Gray.         Subjective   SUBJECTIVE/OBJECTIVE:  HPI  Chief Complaint   Patient presents with    Depression     Pts depression is worsening. Pt states he is taking mirtazapine for it now at night. Pt said been on this for years. Loss of appetite and sleeping a lot.      States increased depression with recent life events. Birthday, Fathers day, Dad passed away last year. Went and visited cousin in [place], went to family [place] and thought it would help him feel better, but it made him feel worse, cousin that is 1 yr older is not doing well. Sleeping a lot more, states he did not think he'd live to see 54 due to his life style. Poor PO intake, missed a lot of work.    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 Missed the last week due to life events and depression.       Current Outpatient Medications   Medication Sig Dispense Refill    venlafaxine (EFFEXOR XR) 37.5 MG extended release capsule Take 1 capsule by mouth daily 30 capsule 3    mirtazapine (REMERON) 15 MG tablet Take 1 tablet by mouth nightly 90 tablet 1    loratadine (CLARITIN) 10 MG tablet TAKE 1 TABLET BY MOUTH EVERY DAY 90 tablet 1    montelukast (SINGULAIR) 10 MG tablet TAKE 1 TABLET BY MOUTH EVERY DAY 90 tablet 1    amphetamine-dextroamphetamine (ADDERALL, 15MG,) 15 MG tablet Take 1 tablet by mouth Daily with lunch for 30 days. Max Daily Amount: 15 mg 30

## 2024-07-24 ENCOUNTER — OFFICE VISIT (OUTPATIENT)
Dept: FAMILY MEDICINE CLINIC | Age: 54
End: 2024-07-24
Payer: COMMERCIAL

## 2024-07-24 VITALS
BODY MASS INDEX: 27.8 KG/M2 | OXYGEN SATURATION: 97 % | HEIGHT: 68 IN | TEMPERATURE: 98.2 F | SYSTOLIC BLOOD PRESSURE: 146 MMHG | HEART RATE: 63 BPM | DIASTOLIC BLOOD PRESSURE: 90 MMHG | WEIGHT: 183.4 LBS

## 2024-07-24 DIAGNOSIS — R03.0 BORDERLINE HYPERTENSION: ICD-10-CM

## 2024-07-24 DIAGNOSIS — Z12.5 PROSTATE CANCER SCREENING: ICD-10-CM

## 2024-07-24 DIAGNOSIS — R53.83 FATIGUE, UNSPECIFIED TYPE: ICD-10-CM

## 2024-07-24 DIAGNOSIS — F90.0 ATTENTION DEFICIT HYPERACTIVITY DISORDER (ADHD), PREDOMINANTLY INATTENTIVE TYPE: ICD-10-CM

## 2024-07-24 DIAGNOSIS — R73.9 HYPERGLYCEMIA: ICD-10-CM

## 2024-07-24 DIAGNOSIS — Z13.220 SCREENING CHOLESTEROL LEVEL: ICD-10-CM

## 2024-07-24 DIAGNOSIS — R68.89 FLU-LIKE SYMPTOMS: Primary | ICD-10-CM

## 2024-07-24 PROCEDURE — 99213 OFFICE O/P EST LOW 20 MIN: CPT | Performed by: INTERNAL MEDICINE

## 2024-07-24 RX ORDER — DEXTROAMPHETAMINE SACCHARATE, AMPHETAMINE ASPARTATE, DEXTROAMPHETAMINE SULFATE AND AMPHETAMINE SULFATE 7.5; 7.5; 7.5; 7.5 MG/1; MG/1; MG/1; MG/1
30 TABLET ORAL DAILY
Qty: 30 TABLET | Refills: 0 | Status: SHIPPED | OUTPATIENT
Start: 2024-07-24 | End: 2024-08-23

## 2024-07-24 RX ORDER — DEXTROAMPHETAMINE SACCHARATE, AMPHETAMINE ASPARTATE, DEXTROAMPHETAMINE SULFATE AND AMPHETAMINE SULFATE 3.75; 3.75; 3.75; 3.75 MG/1; MG/1; MG/1; MG/1
15 TABLET ORAL
Qty: 30 TABLET | Refills: 0 | Status: SHIPPED | OUTPATIENT
Start: 2024-07-24 | End: 2024-08-23

## 2024-07-24 NOTE — PROGRESS NOTES
SUBJECTIVE:  Jus Ennis is a 54 y.o. male being evaluated for:    Chief Complaint   Patient presents with    Fatigue     Pt states he has been fatique for 2 weeks now   Took 2 COVID test 7-18-24 both negative   Pt states he has been off work since 7-14-24 due to not feeling well. Pt needs note to return to work          HPI   Employer said needs doctor's note to come back to work   Has been off work since 7/14   Felt had COVID but testing negative  Felt as when he had covid  Sweating and cold and chills No sinus congestion   Fatigued Body still feels as if has no energy and joints aching  In bed for 5 days   Blood pressure up here  Has lost weight   Allergies   Allergen Reactions    Naprosyn [Naproxen]      Diarrhea     Lisinopril Other (See Comments)     Cough     Current Outpatient Medications   Medication Sig Dispense Refill    amphetamine-dextroamphetamine (ADDERALL, 15MG,) 15 MG tablet Take 1 tablet by mouth Daily with lunch for 30 days. Max Daily Amount: 15 mg 30 tablet 0    amphetamine-dextroamphetamine (ADDERALL, 30MG,) 30 MG tablet Take 1 tablet by mouth daily for 30 days. Max Daily Amount: 30 mg 30 tablet 0    venlafaxine (EFFEXOR XR) 37.5 MG extended release capsule Take 1 capsule by mouth daily 30 capsule 3    mirtazapine (REMERON) 15 MG tablet Take 1 tablet by mouth nightly 90 tablet 1    montelukast (SINGULAIR) 10 MG tablet TAKE 1 TABLET BY MOUTH EVERY DAY 90 tablet 1    azelastine (ASTELIN) 0.1 % nasal spray 2 sprays by Nasal route 2 times daily Use in each nostril as directed (Patient not taking: Reported on 6/21/2024) 120 mL 1     No current facility-administered medications for this visit.         Social History     Socioeconomic History    Marital status:      Spouse name: Not on file    Number of children: Not on file    Years of education: Not on file    Highest education level: Not on file   Occupational History    Not on file   Tobacco Use    Smoking status: Every Day     Current

## 2024-07-30 ENCOUNTER — TELEPHONE (OUTPATIENT)
Dept: FAMILY MEDICINE CLINIC | Age: 54
End: 2024-07-30

## 2024-08-20 DIAGNOSIS — F90.0 ATTENTION DEFICIT HYPERACTIVITY DISORDER (ADHD), PREDOMINANTLY INATTENTIVE TYPE: ICD-10-CM

## 2024-08-20 RX ORDER — DEXTROAMPHETAMINE SACCHARATE, AMPHETAMINE ASPARTATE, DEXTROAMPHETAMINE SULFATE AND AMPHETAMINE SULFATE 3.75; 3.75; 3.75; 3.75 MG/1; MG/1; MG/1; MG/1
15 TABLET ORAL
Qty: 30 TABLET | Refills: 0 | Status: SHIPPED | OUTPATIENT
Start: 2024-08-21 | End: 2024-09-20

## 2024-08-20 RX ORDER — DEXTROAMPHETAMINE SACCHARATE, AMPHETAMINE ASPARTATE, DEXTROAMPHETAMINE SULFATE AND AMPHETAMINE SULFATE 7.5; 7.5; 7.5; 7.5 MG/1; MG/1; MG/1; MG/1
30 TABLET ORAL DAILY
Qty: 30 TABLET | Refills: 0 | Status: SHIPPED | OUTPATIENT
Start: 2024-08-21 | End: 2024-09-20

## 2024-09-17 RX ORDER — MIRTAZAPINE 15 MG/1
TABLET, FILM COATED ORAL
Qty: 30 TABLET | Refills: 5 | Status: SHIPPED | OUTPATIENT
Start: 2024-09-17

## 2024-09-18 DIAGNOSIS — F90.0 ATTENTION DEFICIT HYPERACTIVITY DISORDER (ADHD), PREDOMINANTLY INATTENTIVE TYPE: ICD-10-CM

## 2024-09-18 RX ORDER — DEXTROAMPHETAMINE SACCHARATE, AMPHETAMINE ASPARTATE, DEXTROAMPHETAMINE SULFATE AND AMPHETAMINE SULFATE 7.5; 7.5; 7.5; 7.5 MG/1; MG/1; MG/1; MG/1
30 TABLET ORAL DAILY
Qty: 30 TABLET | Refills: 0 | Status: SHIPPED | OUTPATIENT
Start: 2024-09-18 | End: 2024-10-18

## 2024-09-18 RX ORDER — DEXTROAMPHETAMINE SACCHARATE, AMPHETAMINE ASPARTATE, DEXTROAMPHETAMINE SULFATE AND AMPHETAMINE SULFATE 3.75; 3.75; 3.75; 3.75 MG/1; MG/1; MG/1; MG/1
15 TABLET ORAL
Qty: 30 TABLET | Refills: 0 | Status: SHIPPED | OUTPATIENT
Start: 2024-09-18 | End: 2024-10-18

## 2024-09-19 ENCOUNTER — TELEPHONE (OUTPATIENT)
Dept: FAMILY MEDICINE CLINIC | Age: 54
End: 2024-09-19

## 2024-09-25 ENCOUNTER — OFFICE VISIT (OUTPATIENT)
Dept: FAMILY MEDICINE CLINIC | Age: 54
End: 2024-09-25
Payer: COMMERCIAL

## 2024-09-25 VITALS
WEIGHT: 188.4 LBS | TEMPERATURE: 99.5 F | OXYGEN SATURATION: 96 % | RESPIRATION RATE: 18 BRPM | SYSTOLIC BLOOD PRESSURE: 146 MMHG | BODY MASS INDEX: 28.55 KG/M2 | HEART RATE: 70 BPM | DIASTOLIC BLOOD PRESSURE: 102 MMHG | HEIGHT: 68 IN

## 2024-09-25 DIAGNOSIS — F90.0 ATTENTION DEFICIT HYPERACTIVITY DISORDER (ADHD), PREDOMINANTLY INATTENTIVE TYPE: ICD-10-CM

## 2024-09-25 DIAGNOSIS — I10 PRIMARY HYPERTENSION: ICD-10-CM

## 2024-09-25 DIAGNOSIS — F33.1 MODERATE EPISODE OF RECURRENT MAJOR DEPRESSIVE DISORDER (HCC): Primary | ICD-10-CM

## 2024-09-25 PROCEDURE — 3080F DIAST BP >= 90 MM HG: CPT | Performed by: INTERNAL MEDICINE

## 2024-09-25 PROCEDURE — 99213 OFFICE O/P EST LOW 20 MIN: CPT | Performed by: INTERNAL MEDICINE

## 2024-09-25 PROCEDURE — 3077F SYST BP >= 140 MM HG: CPT | Performed by: INTERNAL MEDICINE

## 2024-09-25 RX ORDER — VALSARTAN 80 MG/1
80 TABLET ORAL DAILY
Qty: 30 TABLET | Refills: 2 | Status: SHIPPED | OUTPATIENT
Start: 2024-09-25

## 2024-09-25 RX ORDER — BUPROPION HYDROCHLORIDE 150 MG/1
150 TABLET ORAL EVERY MORNING
Qty: 30 TABLET | Refills: 3 | Status: SHIPPED | OUTPATIENT
Start: 2024-09-25

## 2024-09-27 ENCOUNTER — TELEPHONE (OUTPATIENT)
Dept: FAMILY MEDICINE CLINIC | Age: 54
End: 2024-09-27

## 2024-09-27 NOTE — TELEPHONE ENCOUNTER
Patient called in stating that his insurance [employer] told him that the dates were incorrect on [place]. Instead of it saying 2024, it needs to say 2025.     Please advise.

## 2024-09-30 NOTE — TELEPHONE ENCOUNTER
Pt called back and stated that the amount of leave needed is needing to be a year, which it looks like the year was not from [id number]. Need to correct end date to 2025.    Form corrected and re-faxed.

## 2024-10-16 DIAGNOSIS — F90.0 ATTENTION DEFICIT HYPERACTIVITY DISORDER (ADHD), PREDOMINANTLY INATTENTIVE TYPE: ICD-10-CM

## 2024-10-16 RX ORDER — DEXTROAMPHETAMINE SACCHARATE, AMPHETAMINE ASPARTATE, DEXTROAMPHETAMINE SULFATE AND AMPHETAMINE SULFATE 3.75; 3.75; 3.75; 3.75 MG/1; MG/1; MG/1; MG/1
15 TABLET ORAL
Qty: 30 TABLET | Refills: 0 | Status: SHIPPED | OUTPATIENT
Start: 2024-10-16 | End: 2024-11-15

## 2024-10-16 RX ORDER — DEXTROAMPHETAMINE SACCHARATE, AMPHETAMINE ASPARTATE, DEXTROAMPHETAMINE SULFATE AND AMPHETAMINE SULFATE 7.5; 7.5; 7.5; 7.5 MG/1; MG/1; MG/1; MG/1
30 TABLET ORAL DAILY
Qty: 30 TABLET | Refills: 0 | Status: SHIPPED | OUTPATIENT
Start: 2024-10-16 | End: 2024-11-15

## 2024-10-16 NOTE — TELEPHONE ENCOUNTER
LOV: 09/25/24  NOV: 10/22/24    Verified [facility]

## 2024-10-17 ENCOUNTER — TELEPHONE (OUTPATIENT)
Dept: FAMILY MEDICINE CLINIC | Age: 54
End: 2024-10-17

## 2024-10-17 DIAGNOSIS — F33.1 MODERATE EPISODE OF RECURRENT MAJOR DEPRESSIVE DISORDER (HCC): Primary | ICD-10-CM

## 2024-10-17 NOTE — TELEPHONE ENCOUNTER
Received FMLA form from pt stating that the year for 2025 is needing fixed.    Dr. Gray agreed to give pt 2 episodes per month for 3 days each for FMLA for 09/29/2024 - 09/29/2025.    Called pt to inform of this information.  Pt gave verbal understanding.    FMLA form faxed to company.    Pt is also agreeable to accept referral for psychiatry for his depression. States he currently doesn't see anyone for this.

## 2024-10-18 DIAGNOSIS — I10 PRIMARY HYPERTENSION: ICD-10-CM

## 2024-10-18 RX ORDER — VALSARTAN 80 MG/1
80 TABLET ORAL DAILY
Qty: 90 TABLET | Refills: 0 | Status: SHIPPED | OUTPATIENT
Start: 2024-10-18

## 2024-11-13 DIAGNOSIS — F90.0 ATTENTION DEFICIT HYPERACTIVITY DISORDER (ADHD), PREDOMINANTLY INATTENTIVE TYPE: ICD-10-CM

## 2024-11-13 RX ORDER — DEXTROAMPHETAMINE SACCHARATE, AMPHETAMINE ASPARTATE, DEXTROAMPHETAMINE SULFATE AND AMPHETAMINE SULFATE 7.5; 7.5; 7.5; 7.5 MG/1; MG/1; MG/1; MG/1
30 TABLET ORAL DAILY
Qty: 30 TABLET | Refills: 0 | Status: SHIPPED | OUTPATIENT
Start: 2024-11-13 | End: 2024-12-13

## 2024-11-13 RX ORDER — DEXTROAMPHETAMINE SACCHARATE, AMPHETAMINE ASPARTATE, DEXTROAMPHETAMINE SULFATE AND AMPHETAMINE SULFATE 3.75; 3.75; 3.75; 3.75 MG/1; MG/1; MG/1; MG/1
15 TABLET ORAL
Qty: 30 TABLET | Refills: 0 | Status: SHIPPED | OUTPATIENT
Start: 2024-11-13 | End: 2024-12-13

## 2024-12-12 DIAGNOSIS — F90.0 ATTENTION DEFICIT HYPERACTIVITY DISORDER (ADHD), PREDOMINANTLY INATTENTIVE TYPE: ICD-10-CM

## 2024-12-12 RX ORDER — DEXTROAMPHETAMINE SACCHARATE, AMPHETAMINE ASPARTATE, DEXTROAMPHETAMINE SULFATE AND AMPHETAMINE SULFATE 7.5; 7.5; 7.5; 7.5 MG/1; MG/1; MG/1; MG/1
30 TABLET ORAL DAILY
Qty: 30 TABLET | Refills: 0 | Status: SHIPPED | OUTPATIENT
Start: 2024-12-13 | End: 2025-01-12

## 2024-12-12 RX ORDER — DEXTROAMPHETAMINE SACCHARATE, AMPHETAMINE ASPARTATE, DEXTROAMPHETAMINE SULFATE AND AMPHETAMINE SULFATE 3.75; 3.75; 3.75; 3.75 MG/1; MG/1; MG/1; MG/1
15 TABLET ORAL
Qty: 30 TABLET | Refills: 0 | Status: SHIPPED | OUTPATIENT
Start: 2024-12-13 | End: 2025-01-12

## 2024-12-19 DIAGNOSIS — F90.0 ATTENTION DEFICIT HYPERACTIVITY DISORDER (ADHD), PREDOMINANTLY INATTENTIVE TYPE: ICD-10-CM

## 2024-12-19 RX ORDER — DEXTROAMPHETAMINE SACCHARATE, AMPHETAMINE ASPARTATE, DEXTROAMPHETAMINE SULFATE AND AMPHETAMINE SULFATE 3.75; 3.75; 3.75; 3.75 MG/1; MG/1; MG/1; MG/1
15 TABLET ORAL
Qty: 30 TABLET | Refills: 0 | Status: CANCELLED | OUTPATIENT
Start: 2024-12-19 | End: 2025-01-18

## 2024-12-19 NOTE — TELEPHONE ENCOUNTER
LOV: 09/25/24  NOV: None     Verified going to [facility] in [place], [place].

## 2024-12-19 NOTE — TELEPHONE ENCOUNTER
SUNNI patient regarding this information. RX already sent to the pharmacy on 12-. Patient will call the pharmacy to  RX.

## 2025-01-13 DIAGNOSIS — F90.0 ATTENTION DEFICIT HYPERACTIVITY DISORDER (ADHD), PREDOMINANTLY INATTENTIVE TYPE: ICD-10-CM

## 2025-01-13 RX ORDER — DEXTROAMPHETAMINE SACCHARATE, AMPHETAMINE ASPARTATE, DEXTROAMPHETAMINE SULFATE AND AMPHETAMINE SULFATE 7.5; 7.5; 7.5; 7.5 MG/1; MG/1; MG/1; MG/1
30 TABLET ORAL DAILY
Qty: 30 TABLET | Refills: 0 | Status: SHIPPED | OUTPATIENT
Start: 2025-01-13 | End: 2025-02-12

## 2025-01-15 NOTE — TELEPHONE ENCOUNTER
Called pt and LM informing to call office back.     Pt is due for appointment prior to next refill.        
Called pt and LM informing to call office back.    Pt is due for appointment prior to next refill.  
Due for visit again before next refill  
LM for patient to call the office for an appointment.   
LOV: 10/22/24  NOV: None   
DISCHARGE

## 2025-02-05 DIAGNOSIS — F90.0 ATTENTION DEFICIT HYPERACTIVITY DISORDER (ADHD), PREDOMINANTLY INATTENTIVE TYPE: ICD-10-CM

## 2025-02-05 RX ORDER — DEXTROAMPHETAMINE SACCHARATE, AMPHETAMINE ASPARTATE, DEXTROAMPHETAMINE SULFATE AND AMPHETAMINE SULFATE 3.75; 3.75; 3.75; 3.75 MG/1; MG/1; MG/1; MG/1
15 TABLET ORAL
Qty: 30 TABLET | Refills: 0 | Status: SHIPPED | OUTPATIENT
Start: 2025-02-05 | End: 2025-03-07

## 2025-02-05 NOTE — TELEPHONE ENCOUNTER
Remind him that he has labs to do and I will fill his script but it does need 3 month  No visit since September  and he was told with last visit that he is due for an appointment  ? Any sooner we can get him in due for his 30 next week ? And fasting labs again

## 2025-02-05 NOTE — TELEPHONE ENCOUNTER
Patient called in asking to schedule an appointment and to refill his Adderal 15 Mg. I scheduled the patient on 03/10/25. The patient  asked if Dr. Rueda would fill his medication and I told the patient I would send a message'/refill request to Dr. Gray. Jus said, \"to cancel the Dxxn appointment unless Dr. Gray refills his adderal prescription\". Patient had an appointment 10/22/24 and he cancelled.     Please Advise.     LOV: 09/25/24  NOV: 03/10/25

## 2025-02-06 NOTE — TELEPHONE ENCOUNTER
LM for patient to call the office to schedule an appointment. Patient mailed a letter today as well regarding appointment and labs.

## 2025-02-27 DIAGNOSIS — F90.0 ATTENTION DEFICIT HYPERACTIVITY DISORDER (ADHD), PREDOMINANTLY INATTENTIVE TYPE: ICD-10-CM

## 2025-02-27 RX ORDER — DEXTROAMPHETAMINE SACCHARATE, AMPHETAMINE ASPARTATE, DEXTROAMPHETAMINE SULFATE AND AMPHETAMINE SULFATE 7.5; 7.5; 7.5; 7.5 MG/1; MG/1; MG/1; MG/1
30 TABLET ORAL DAILY
Qty: 30 TABLET | Refills: 0 | Status: SHIPPED | OUTPATIENT
Start: 2025-03-05 | End: 2025-04-04

## 2025-03-07 DIAGNOSIS — R53.83 FATIGUE, UNSPECIFIED TYPE: ICD-10-CM

## 2025-03-07 DIAGNOSIS — R03.0 BORDERLINE HYPERTENSION: ICD-10-CM

## 2025-03-07 DIAGNOSIS — R73.9 HYPERGLYCEMIA: ICD-10-CM

## 2025-03-07 DIAGNOSIS — Z12.5 PROSTATE CANCER SCREENING: ICD-10-CM

## 2025-03-07 DIAGNOSIS — Z13.220 SCREENING CHOLESTEROL LEVEL: ICD-10-CM

## 2025-03-08 LAB
BASOPHILS # BLD: 0 K/UL (ref 0–0.2)
BASOPHILS NFR BLD: 0 %
DEPRECATED RDW RBC AUTO: 13.7 % (ref 12.4–15.4)
EOSINOPHIL # BLD: 0 K/UL (ref 0–0.6)
EOSINOPHIL NFR BLD: 0 %
EST. AVERAGE GLUCOSE BLD GHB EST-MCNC: 111.2 MG/DL
HBA1C MFR BLD: 5.5 %
HCT VFR BLD AUTO: 46.5 % (ref 40.5–52.5)
HGB BLD-MCNC: 16.2 G/DL (ref 13.5–17.5)
LYMPHOCYTES # BLD: 1.9 K/UL (ref 1–5.1)
LYMPHOCYTES NFR BLD: 20 %
MCH RBC QN AUTO: 32.4 PG (ref 26–34)
MCHC RBC AUTO-ENTMCNC: 34.9 G/DL (ref 31–36)
MCV RBC AUTO: 92.8 FL (ref 80–100)
MONOCYTES # BLD: 1 K/UL (ref 0–1.3)
MONOCYTES NFR BLD: 12 %
NEUTROPHILS # BLD: 5.3 K/UL (ref 1.7–7.7)
NEUTROPHILS NFR BLD: 59 %
NEUTS BAND NFR BLD MANUAL: 6 % (ref 0–7)
PLATELET # BLD AUTO: 369 K/UL (ref 135–450)
PMV BLD AUTO: 7.6 FL (ref 5–10.5)
RBC # BLD AUTO: 5.01 M/UL (ref 4.2–5.9)
RBC MORPH BLD: NORMAL
REASON FOR REJECTION: NORMAL
REJECTED TEST: NORMAL
SMUDGE CELLS BLD QL SMEAR: PRESENT
VARIANT LYMPHS NFR BLD MANUAL: 3 % (ref 0–6)
WBC # BLD AUTO: 8.2 K/UL (ref 4–11)

## 2025-03-10 ENCOUNTER — OFFICE VISIT (OUTPATIENT)
Dept: FAMILY MEDICINE CLINIC | Age: 55
End: 2025-03-10
Payer: COMMERCIAL

## 2025-03-10 ENCOUNTER — RESULTS FOLLOW-UP (OUTPATIENT)
Dept: FAMILY MEDICINE CLINIC | Age: 55
End: 2025-03-10

## 2025-03-10 VITALS
HEIGHT: 68 IN | WEIGHT: 187.6 LBS | SYSTOLIC BLOOD PRESSURE: 198 MMHG | OXYGEN SATURATION: 96 % | TEMPERATURE: 98.9 F | DIASTOLIC BLOOD PRESSURE: 104 MMHG | BODY MASS INDEX: 28.43 KG/M2 | HEART RATE: 70 BPM | RESPIRATION RATE: 18 BRPM

## 2025-03-10 DIAGNOSIS — Z51.81 MEDICATION MONITORING ENCOUNTER: ICD-10-CM

## 2025-03-10 DIAGNOSIS — I10 PRIMARY HYPERTENSION: ICD-10-CM

## 2025-03-10 DIAGNOSIS — F33.1 MODERATE EPISODE OF RECURRENT MAJOR DEPRESSIVE DISORDER (HCC): Primary | ICD-10-CM

## 2025-03-10 DIAGNOSIS — Z12.5 PROSTATE CANCER SCREENING: ICD-10-CM

## 2025-03-10 DIAGNOSIS — F90.0 ATTENTION DEFICIT HYPERACTIVITY DISORDER (ADHD), PREDOMINANTLY INATTENTIVE TYPE: ICD-10-CM

## 2025-03-10 DIAGNOSIS — Z13.220 SCREENING CHOLESTEROL LEVEL: ICD-10-CM

## 2025-03-10 PROCEDURE — 99214 OFFICE O/P EST MOD 30 MIN: CPT | Performed by: INTERNAL MEDICINE

## 2025-03-10 PROCEDURE — 3077F SYST BP >= 140 MM HG: CPT | Performed by: INTERNAL MEDICINE

## 2025-03-10 PROCEDURE — 3080F DIAST BP >= 90 MM HG: CPT | Performed by: INTERNAL MEDICINE

## 2025-03-10 RX ORDER — DEXTROAMPHETAMINE SACCHARATE, AMPHETAMINE ASPARTATE, DEXTROAMPHETAMINE SULFATE AND AMPHETAMINE SULFATE 7.5; 7.5; 7.5; 7.5 MG/1; MG/1; MG/1; MG/1
30 TABLET ORAL DAILY
Qty: 30 TABLET | Refills: 0 | Status: SHIPPED | OUTPATIENT
Start: 2025-03-10 | End: 2025-04-09

## 2025-03-10 RX ORDER — AMOXICILLIN 875 MG/1
875 TABLET, COATED ORAL 2 TIMES DAILY
Qty: 20 TABLET | Refills: 0 | Status: SHIPPED | OUTPATIENT
Start: 2025-03-10 | End: 2025-03-20

## 2025-03-10 RX ORDER — MIRTAZAPINE 15 MG/1
15 TABLET, FILM COATED ORAL NIGHTLY
Qty: 90 TABLET | Refills: 1 | Status: SHIPPED | OUTPATIENT
Start: 2025-03-10

## 2025-03-10 RX ORDER — BUPROPION HYDROCHLORIDE 300 MG/1
300 TABLET ORAL EVERY MORNING
Qty: 30 TABLET | Refills: 3 | Status: SHIPPED | OUTPATIENT
Start: 2025-03-10

## 2025-03-10 RX ORDER — VALSARTAN AND HYDROCHLOROTHIAZIDE 160; 12.5 MG/1; MG/1
1 TABLET, FILM COATED ORAL DAILY
Qty: 90 TABLET | Refills: 1 | Status: SHIPPED | OUTPATIENT
Start: 2025-03-10

## 2025-03-10 RX ORDER — DEXTROAMPHETAMINE SACCHARATE, AMPHETAMINE ASPARTATE, DEXTROAMPHETAMINE SULFATE AND AMPHETAMINE SULFATE 3.75; 3.75; 3.75; 3.75 MG/1; MG/1; MG/1; MG/1
15 TABLET ORAL
Qty: 30 TABLET | Refills: 0 | Status: SHIPPED | OUTPATIENT
Start: 2025-03-10 | End: 2025-04-09

## 2025-03-10 SDOH — ECONOMIC STABILITY: FOOD INSECURITY: WITHIN THE PAST 12 MONTHS, THE FOOD YOU BOUGHT JUST DIDN'T LAST AND YOU DIDN'T HAVE MONEY TO GET MORE.: NEVER TRUE

## 2025-03-10 SDOH — ECONOMIC STABILITY: FOOD INSECURITY: WITHIN THE PAST 12 MONTHS, YOU WORRIED THAT YOUR FOOD WOULD RUN OUT BEFORE YOU GOT MONEY TO BUY MORE.: NEVER TRUE

## 2025-03-10 ASSESSMENT — PATIENT HEALTH QUESTIONNAIRE - PHQ9
SUM OF ALL RESPONSES TO PHQ QUESTIONS 1-9: 0
9. THOUGHTS THAT YOU WOULD BE BETTER OFF DEAD, OR OF HURTING YOURSELF: NOT AT ALL
SUM OF ALL RESPONSES TO PHQ QUESTIONS 1-9: 0
3. TROUBLE FALLING OR STAYING ASLEEP: NOT AT ALL
1. LITTLE INTEREST OR PLEASURE IN DOING THINGS: NOT AT ALL
10. IF YOU CHECKED OFF ANY PROBLEMS, HOW DIFFICULT HAVE THESE PROBLEMS MADE IT FOR YOU TO DO YOUR WORK, TAKE CARE OF THINGS AT HOME, OR GET ALONG WITH OTHER PEOPLE: NOT DIFFICULT AT ALL
6. FEELING BAD ABOUT YOURSELF - OR THAT YOU ARE A FAILURE OR HAVE LET YOURSELF OR YOUR FAMILY DOWN: NOT AT ALL
2. FEELING DOWN, DEPRESSED OR HOPELESS: NOT AT ALL
SUM OF ALL RESPONSES TO PHQ QUESTIONS 1-9: 0
7. TROUBLE CONCENTRATING ON THINGS, SUCH AS READING THE NEWSPAPER OR WATCHING TELEVISION: NOT AT ALL
4. FEELING TIRED OR HAVING LITTLE ENERGY: NOT AT ALL
8. MOVING OR SPEAKING SO SLOWLY THAT OTHER PEOPLE COULD HAVE NOTICED. OR THE OPPOSITE, BEING SO FIGETY OR RESTLESS THAT YOU HAVE BEEN MOVING AROUND A LOT MORE THAN USUAL: NOT AT ALL
5. POOR APPETITE OR OVEREATING: NOT AT ALL
SUM OF ALL RESPONSES TO PHQ QUESTIONS 1-9: 0

## 2025-03-10 ASSESSMENT — ENCOUNTER SYMPTOMS
SINUS PAIN: 0
ABDOMINAL PAIN: 0
DIARRHEA: 0
RHINORRHEA: 1
NAUSEA: 0
VOMITING: 0

## 2025-03-10 NOTE — PROGRESS NOTES
(AMOXIL) 875 MG tablet Take 1 tablet by mouth 2 times daily for 10 days 20 tablet 0    valsartan (DIOVAN) 80 MG tablet TAKE 1 TABLET BY MOUTH EVERY DAY 90 tablet 0    montelukast (SINGULAIR) 10 MG tablet TAKE 1 TABLET BY MOUTH EVERY DAY 90 tablet 1    azelastine (ASTELIN) 0.1 % nasal spray 2 sprays by Nasal route 2 times daily Use in each nostril as directed 120 mL 1     No current facility-administered medications for this visit.            Results  Laboratory Studies  Blood count was normal. Hemoglobin A1c was normal.          Objective     Review of Systems   Constitutional:  Positive for fatigue.   HENT:  Positive for congestion, ear pain and rhinorrhea. Negative for nosebleeds and sinus pain.    Cardiovascular:  Negative for chest pain, palpitations and leg swelling.   Gastrointestinal:  Negative for abdominal pain, diarrhea, nausea and vomiting.   Neurological:  Negative for dizziness, light-headedness and headaches.   Psychiatric/Behavioral:  Positive for decreased concentration and dysphoric mood. Negative for suicidal ideas.         Physical Exam     Physical Exam  Throat appears normal, clear with no swelling, redness, or exudate. No sinus tenderness is present. Left tympanic membrane is clear. Right ear has thickened white cerumen, but it is not obstructing the view.  Neck shows no lymph nodes, no thyromegaly, and is nontender. No bruits are detected.  Lungs are clear to auscultation, mildly diminished. Good air exchange. Comfortable breathing.  Heart exhibits a regular rate and rhythm without murmurs, rubs, or gallops.  Abdomen is soft, nontender, with no masses. No hepatosplenomegaly.  No swelling in the legs.    Vital Signs  Blood pressure 194/102, pulse 70, temperature 98.9, respirations 18, oxygen saturation 96%. Weight 187, height 5 foot 8, BMI 28.5.              The patient (or guardian, if applicable) and other individuals in attendance with the patient were advised that [employer]

## 2025-03-10 NOTE — ASSESSMENT & PLAN NOTE
Stable and reviewed [place]     Orders:    amphetamine-dextroamphetamine (ADDERALL, 30MG,) 30 MG tablet; Take 1 tablet by mouth daily for 30 days. Max Daily Amount: 30 mg    amphetamine-dextroamphetamine (ADDERALL, 15MG,) 15 MG tablet; Take 1 tablet by mouth Daily with lunch for 30 days. Max Daily Amount: 15 mg

## 2025-03-11 ENCOUNTER — RESULTS FOLLOW-UP (OUTPATIENT)
Dept: FAMILY MEDICINE CLINIC | Age: 55
End: 2025-03-11

## 2025-03-11 LAB
AMPHETAMINES UR QL SCN>1000 NG/ML: POSITIVE
BARBITURATES UR QL SCN>200 NG/ML: ABNORMAL
BENZODIAZ UR QL SCN>200 NG/ML: ABNORMAL
CANNABINOIDS UR QL SCN>50 NG/ML: POSITIVE
COCAINE UR QL SCN: ABNORMAL
DRUG SCREEN COMMENT UR-IMP: ABNORMAL
FENTANYL SCREEN, URINE: ABNORMAL
METHADONE UR QL SCN>300 NG/ML: ABNORMAL
OPIATES UR QL SCN>300 NG/ML: ABNORMAL
OXYCODONE UR QL SCN: ABNORMAL
PCP UR QL SCN>25 NG/ML: ABNORMAL
PH UR STRIP: 6.5 [PH]

## 2025-03-13 ENCOUNTER — TELEPHONE (OUTPATIENT)
Dept: FAMILY MEDICINE CLINIC | Age: 55
End: 2025-03-13

## 2025-03-13 NOTE — TELEPHONE ENCOUNTER
Chantal aleman/ [facility] has tried contacting pt to have them recollect all of his chemistries labs but pt has never returned their call. Chantal would like for our office to reach out to the patient concerning this.

## 2025-03-17 DIAGNOSIS — I10 PRIMARY HYPERTENSION: Primary | ICD-10-CM

## 2025-03-17 DIAGNOSIS — R53.83 FATIGUE, UNSPECIFIED TYPE: ICD-10-CM

## 2025-03-17 DIAGNOSIS — Z12.5 PROSTATE CANCER SCREENING: ICD-10-CM

## 2025-03-17 DIAGNOSIS — Z13.220 SCREENING CHOLESTEROL LEVEL: ICD-10-CM

## 2025-03-17 NOTE — TELEPHONE ENCOUNTER
Called pt and LM informing to call office back.     Referred by: Ronaldo Chaves MD; Medical Diagnosis (from order):    Diagnosis Information      Diagnosis    840.4 (ICD-9-CM) - M75.111 (ICD-10-CM) - Incomplete tear of right rotator cuff                Physical Therapy -  Daily Treatment Note    Visit:  5     SUBJECTIVE                                                                                                             Patient denies pain at the start of today's treatment. Reports he has not taken any pain medication since last Tuesday. States he is able to sleep 4-5 hours uninterrupted overnight, then is able to fall back asleep after about 30-45 minutes.    Pain / Symptoms:  Pain rating (out of 10): Current: 0     OBJECTIVE                                                                                                                        TREATMENT                                                                                                                  Therapeutic Exercise:  PROM all planes shoulder and elbow on R 10 rep 2 sets flex to only 90 deg  S/l scap PNF 15  Sitting PNF D 2 with L UE for posture 10 rep 2 set  Sitting scap squeeze 10 rep 2 set        Manual Therapy:  STM upper trap and med border scapula and pec    Skilled input: verbal instruction/cues  for therapist position for techniques and hand placement and palpation for techniques as described above and how they are pertinent to the patient's plan of care.    Home Exercise Program: Access Code: [email or web address]   URL: [email or web address]   Date: 10/21/2020   Prepared by: Christine Terry      Exercises  · Flexion-Extension Shoulder Pendulum with Table Support - 10 reps - 2 sets - 3-4x weekly  · Seated Cervical Rotation AROM - 10 reps - 2 sets - 3-4x weekly  · Seated Cervical Sidebending AROM - 10 reps - 2 sets - 3-4x weekly         ASSESSMENT                                                                                                             Patient reporting  improvement in ability to sleep longer stretches overnight recently. ROM remains tight, but within protocol limitations. Patient encouraged to continue with HEP between sessions, but not progress (R) shoulder too quickly. Patient could benefit from further P.T. to improve (R) shoulder ROM, strength and posture to allow for return to prior level of function.   Pain/symptoms after session: 0            Procedures and total treatment time documented Time Entry flowsheet.

## 2025-03-24 LAB — NONINV COLON CA DNA+OCC BLD SCRN STL QL: NEGATIVE

## 2025-04-03 DIAGNOSIS — F90.0 ATTENTION DEFICIT HYPERACTIVITY DISORDER (ADHD), PREDOMINANTLY INATTENTIVE TYPE: ICD-10-CM

## 2025-04-03 RX ORDER — DEXTROAMPHETAMINE SACCHARATE, AMPHETAMINE ASPARTATE, DEXTROAMPHETAMINE SULFATE AND AMPHETAMINE SULFATE 7.5; 7.5; 7.5; 7.5 MG/1; MG/1; MG/1; MG/1
30 TABLET ORAL DAILY
Qty: 30 TABLET | Refills: 0 | Status: SHIPPED | OUTPATIENT
Start: 2025-04-07 | End: 2025-05-07

## 2025-04-03 RX ORDER — DEXTROAMPHETAMINE SACCHARATE, AMPHETAMINE ASPARTATE, DEXTROAMPHETAMINE SULFATE AND AMPHETAMINE SULFATE 3.75; 3.75; 3.75; 3.75 MG/1; MG/1; MG/1; MG/1
15 TABLET ORAL
Qty: 30 TABLET | Refills: 0 | Status: SHIPPED | OUTPATIENT
Start: 2025-04-07 | End: 2025-05-07

## 2025-04-03 NOTE — TELEPHONE ENCOUNTER
Patient called the office today with a request to refill Adderall both strengths. Please send to [facility] in [place].

## 2025-05-09 DIAGNOSIS — F90.0 ATTENTION DEFICIT HYPERACTIVITY DISORDER (ADHD), PREDOMINANTLY INATTENTIVE TYPE: ICD-10-CM

## 2025-05-09 RX ORDER — DEXTROAMPHETAMINE SACCHARATE, AMPHETAMINE ASPARTATE, DEXTROAMPHETAMINE SULFATE AND AMPHETAMINE SULFATE 3.75; 3.75; 3.75; 3.75 MG/1; MG/1; MG/1; MG/1
15 TABLET ORAL
Qty: 30 TABLET | Refills: 0 | Status: SHIPPED | OUTPATIENT
Start: 2025-05-12 | End: 2025-06-11

## 2025-05-19 DIAGNOSIS — F90.0 ATTENTION DEFICIT HYPERACTIVITY DISORDER (ADHD), PREDOMINANTLY INATTENTIVE TYPE: ICD-10-CM

## 2025-05-19 RX ORDER — DEXTROAMPHETAMINE SACCHARATE, AMPHETAMINE ASPARTATE, DEXTROAMPHETAMINE SULFATE AND AMPHETAMINE SULFATE 7.5; 7.5; 7.5; 7.5 MG/1; MG/1; MG/1; MG/1
30 TABLET ORAL DAILY
Qty: 30 TABLET | Refills: 0 | Status: SHIPPED | OUTPATIENT
Start: 2025-05-19 | End: 2025-06-18

## 2025-05-19 NOTE — TELEPHONE ENCOUNTER
Pt calling for medication  refill :      amphetamine-dextroamphetamine (ADDERALL, 30MG,) 30 MG tablet 30 tablet 0 4/7/2025 5/7/2025    Sig - Route: Take 1 tablet by mouth daily for 30 days. Max Daily Amount: 30 mg - Oral    Sent to pharmacy as: Amphetamine-Dextroamphetamine 30 MG Oral Tablet (ADDERALL (30MG))    Earliest Fill Date: 4/7/2025    E-Prescribing Status: Receipt confirmed by pharmacy (4/3/2025  6:32 PM EDT)        Qty : 0    Pharmacy    [id number] - [place] - [address] [place] [address] - P [phone] - F [phone] [id number] [place] [address]  Phone: [phone]  Fax: [phone]       Last ov : 03/10/2025  Next ov : 06/10/2025

## 2025-06-10 ENCOUNTER — TELEPHONE (OUTPATIENT)
Dept: FAMILY MEDICINE CLINIC | Age: 55
End: 2025-06-10

## 2025-06-10 DIAGNOSIS — F90.0 ATTENTION DEFICIT HYPERACTIVITY DISORDER (ADHD), PREDOMINANTLY INATTENTIVE TYPE: ICD-10-CM

## 2025-06-10 RX ORDER — DEXTROAMPHETAMINE SACCHARATE, AMPHETAMINE ASPARTATE, DEXTROAMPHETAMINE SULFATE AND AMPHETAMINE SULFATE 3.75; 3.75; 3.75; 3.75 MG/1; MG/1; MG/1; MG/1
15 TABLET ORAL
Qty: 30 TABLET | Refills: 0 | OUTPATIENT
Start: 2025-06-10 | End: 2025-07-10

## 2025-06-10 NOTE — TELEPHONE ENCOUNTER
----- Message from Lesa VAZQUEZ sent at 6/10/2025 10:25 AM EDT -----  Regarding: ECC Appointment Request  ECC Appointment Request    Patient needs appointment for ECC Appointment Type: Existing Condition Follow Up.    Patient Requested Dates(s):any day as soon as possible except today  Patient Requested Time:afternoon  Provider Name:   Kylah Gray MD        Reason for Appointment Request: Established Patient - Available appointments did not meet patient need the patient had to cancel his appointment today due to conflict of schedule and wanted to reschedule -bp follow up  --------------------------------------------------------------------------------------------------------------------------    Relationship to Patient: Self     Call Back Information: OK to leave message on voicemail  Preferred Call Back Number: Phone [phone]

## 2025-06-10 NOTE — TELEPHONE ENCOUNTER
Called pt and LM informing to call office back.    Next available appt is 7/1/25 at 9am.     No guarantee that pt will get refills until then as patient needs to be seen every 3 months per state law as he is on controlled medications.

## 2025-06-16 RX ORDER — DEXTROAMPHETAMINE SACCHARATE, AMPHETAMINE ASPARTATE, DEXTROAMPHETAMINE SULFATE AND AMPHETAMINE SULFATE 7.5; 7.5; 7.5; 7.5 MG/1; MG/1; MG/1; MG/1
30 TABLET ORAL DAILY
Qty: 30 TABLET | Refills: 0 | Status: SHIPPED | OUTPATIENT
Start: 2025-06-16 | End: 2025-07-16

## 2025-06-16 RX ORDER — DEXTROAMPHETAMINE SACCHARATE, AMPHETAMINE ASPARTATE, DEXTROAMPHETAMINE SULFATE AND AMPHETAMINE SULFATE 3.75; 3.75; 3.75; 3.75 MG/1; MG/1; MG/1; MG/1
15 TABLET ORAL
Qty: 30 TABLET | Refills: 0 | Status: SHIPPED | OUTPATIENT
Start: 2025-06-16 | End: 2025-07-16

## 2025-07-01 ENCOUNTER — OFFICE VISIT (OUTPATIENT)
Dept: FAMILY MEDICINE CLINIC | Age: 55
End: 2025-07-01

## 2025-07-01 VITALS
DIASTOLIC BLOOD PRESSURE: 96 MMHG | OXYGEN SATURATION: 90 % | SYSTOLIC BLOOD PRESSURE: 148 MMHG | TEMPERATURE: 98.6 F | BODY MASS INDEX: 28.9 KG/M2 | WEIGHT: 190 LBS | RESPIRATION RATE: 16 BRPM | HEART RATE: 90 BPM

## 2025-07-01 DIAGNOSIS — F90.0 ATTENTION DEFICIT HYPERACTIVITY DISORDER (ADHD), PREDOMINANTLY INATTENTIVE TYPE: ICD-10-CM

## 2025-07-01 DIAGNOSIS — I10 PRIMARY HYPERTENSION: Primary | ICD-10-CM

## 2025-07-01 PROCEDURE — 3080F DIAST BP >= 90 MM HG: CPT | Performed by: INTERNAL MEDICINE

## 2025-07-01 PROCEDURE — 3077F SYST BP >= 140 MM HG: CPT | Performed by: INTERNAL MEDICINE

## 2025-07-01 PROCEDURE — 99212 OFFICE O/P EST SF 10 MIN: CPT | Performed by: INTERNAL MEDICINE

## 2025-07-01 RX ORDER — HYDROCHLOROTHIAZIDE 12.5 MG/1
12.5 CAPSULE ORAL EVERY MORNING
Qty: 90 CAPSULE | Refills: 1 | Status: SHIPPED | OUTPATIENT
Start: 2025-07-01

## 2025-07-01 ASSESSMENT — PATIENT HEALTH QUESTIONNAIRE - PHQ9
5. POOR APPETITE OR OVEREATING: NOT AT ALL
SUM OF ALL RESPONSES TO PHQ QUESTIONS 1-9: 2
SUM OF ALL RESPONSES TO PHQ QUESTIONS 1-9: 2
3. TROUBLE FALLING OR STAYING ASLEEP: NOT AT ALL
9. THOUGHTS THAT YOU WOULD BE BETTER OFF DEAD, OR OF HURTING YOURSELF: NOT AT ALL
2. FEELING DOWN, DEPRESSED OR HOPELESS: SEVERAL DAYS
8. MOVING OR SPEAKING SO SLOWLY THAT OTHER PEOPLE COULD HAVE NOTICED. OR THE OPPOSITE, BEING SO FIGETY OR RESTLESS THAT YOU HAVE BEEN MOVING AROUND A LOT MORE THAN USUAL: NOT AT ALL
7. TROUBLE CONCENTRATING ON THINGS, SUCH AS READING THE NEWSPAPER OR WATCHING TELEVISION: NOT AT ALL
SUM OF ALL RESPONSES TO PHQ QUESTIONS 1-9: 2
SUM OF ALL RESPONSES TO PHQ QUESTIONS 1-9: 2
6. FEELING BAD ABOUT YOURSELF - OR THAT YOU ARE A FAILURE OR HAVE LET YOURSELF OR YOUR FAMILY DOWN: NOT AT ALL
1. LITTLE INTEREST OR PLEASURE IN DOING THINGS: SEVERAL DAYS
10. IF YOU CHECKED OFF ANY PROBLEMS, HOW DIFFICULT HAVE THESE PROBLEMS MADE IT FOR YOU TO DO YOUR WORK, TAKE CARE OF THINGS AT HOME, OR GET ALONG WITH OTHER PEOPLE: NOT DIFFICULT AT ALL
4. FEELING TIRED OR HAVING LITTLE ENERGY: NOT AT ALL

## 2025-07-01 NOTE — PROGRESS NOTES
Jus Ennis (:  1970) is a 55 y.o. male,Established patient, here for evaluation of the following chief complaint(s):  ADHD (3 month med check for refills on Adderall), Hypertension, and Depression         Assessment & Plan         Assessment & Plan  1. Hypertension: Stable.  - Blood pressure remains slightly elevated at 140/90.  - Valsartan was discontinued due to a metallic taste in the mouth.  - Prescribed low-dose hydrochlorothiazide to be taken once daily.  - Advised to monitor salt and caffeine intake.  - Discussed potential side effects including urinary frequency, dizziness, and lightheadedness; instructed to notify the office if adverse effects occur.    2. Attention Deficit Disorder (ADD): Stable.  - Currently on Adderall XR and plain Adderall  daily.  - Not due for a refill at this time.  - Advised to call the office in about a week and a half for a refill.    3. Depression: Stable.  - Current treatment plan will be maintained.    4. Anxiety: Stable.  - Current treatment plan will be maintained.    Follow-up  - A follow-up visit is scheduled in 3 months.       Return in about 3 months (around 10/1/2025), or if symptoms worsen or fail to improve.       Subjective   HPI     History of Present Illness  The patient presents for ADD, hypertension, depression, and anxiety.    He reports that his ADD is well-managed, with satisfactory concentration levels and no current need for an Adderall refill. He is not experiencing any issues related to eating or sleeping.    He has discontinued the use of valsartan due to a persistent metallic taste in his mouth. He was unaware that water intake could influence blood pressure. He consumes a significant amount of Mountain Dew.    His depression appears to be under control, with no reported disturbances in his eating or sleeping patterns.    He is currently uninsured and has not undergone any recent lab tests.       Results            Objective     Review of

## 2025-07-09 RX ORDER — BUPROPION HYDROCHLORIDE 300 MG/1
300 TABLET ORAL EVERY MORNING
Qty: 90 TABLET | Refills: 1 | Status: SHIPPED | OUTPATIENT
Start: 2025-07-09

## 2025-07-11 DIAGNOSIS — F90.0 ATTENTION DEFICIT HYPERACTIVITY DISORDER (ADHD), PREDOMINANTLY INATTENTIVE TYPE: ICD-10-CM

## 2025-07-11 RX ORDER — DEXTROAMPHETAMINE SACCHARATE, AMPHETAMINE ASPARTATE, DEXTROAMPHETAMINE SULFATE AND AMPHETAMINE SULFATE 7.5; 7.5; 7.5; 7.5 MG/1; MG/1; MG/1; MG/1
30 TABLET ORAL DAILY
Qty: 30 TABLET | Refills: 0 | Status: SHIPPED | OUTPATIENT
Start: 2025-07-11 | End: 2025-08-10

## 2025-07-11 RX ORDER — DEXTROAMPHETAMINE SACCHARATE, AMPHETAMINE ASPARTATE, DEXTROAMPHETAMINE SULFATE AND AMPHETAMINE SULFATE 3.75; 3.75; 3.75; 3.75 MG/1; MG/1; MG/1; MG/1
15 TABLET ORAL
Qty: 30 TABLET | Refills: 0 | Status: SHIPPED | OUTPATIENT
Start: 2025-07-11 | End: 2025-08-10

## 2025-07-11 NOTE — TELEPHONE ENCOUNTER
PT IS REQUESTING MEDICATIONS :    amphetamine-dextroamphetamine (ADDERALL, 15MG,) 15 MG tablet 30 tablet 0 6/16/2025 7/16/2025    Sig - Route: Take 1 tablet by mouth Daily with lunch for 30 days. Max Daily Amount: 15 mg - Oral    Sent to pharmacy as: Amphetamine-Dextroamphetamine 15 MG Oral Tablet (ADDERALL (15MG))    Earliest Fill Date: 6/16/2025    E-Prescribing Status: Receipt confirmed by pharmacy (6/16/2025 12:53 PM EDT)      amphetamine-dextroamphetamine (ADDERALL, 30MG,) 30 MG tablet 30 tablet 0 6/16/2025 7/16/2025    Sig - Route: Take 1 tablet by mouth daily for 30 days. Max Daily Amount: 30 mg - Oral    Sent to pharmacy as: Amphetamine-Dextroamphetamine 30 MG Oral Tablet (ADDERALL (30MG))    Earliest Fill Date: 6/16/2025    E-Prescribing Status: Receipt confirmed by pharmacy (6/16/2025 12:53 PM EDT)        QTY LEFT  UNKNOWN     Pharmacy    [id number] - [place] - [address] [address] - P [phone] - F [phone] [id number] [place] [address]  Phone: [phone]  Fax: [phone]       LAST OV : 07/01/2025    NEXT OV : NONE

## 2025-07-11 NOTE — TELEPHONE ENCOUNTER
Controlled Substance Monitoring:    Acute and Chronic Pain Monitoring:        No data to display

## 2025-09-02 DIAGNOSIS — F90.0 ATTENTION DEFICIT HYPERACTIVITY DISORDER (ADHD), PREDOMINANTLY INATTENTIVE TYPE: ICD-10-CM

## 2025-09-02 RX ORDER — DEXTROAMPHETAMINE SACCHARATE, AMPHETAMINE ASPARTATE, DEXTROAMPHETAMINE SULFATE AND AMPHETAMINE SULFATE 3.75; 3.75; 3.75; 3.75 MG/1; MG/1; MG/1; MG/1
15 TABLET ORAL
Qty: 30 TABLET | Refills: 0 | Status: CANCELLED | OUTPATIENT
Start: 2025-09-02 | End: 2025-10-02